# Patient Record
Sex: MALE
[De-identification: names, ages, dates, MRNs, and addresses within clinical notes are randomized per-mention and may not be internally consistent; named-entity substitution may affect disease eponyms.]

---

## 2018-12-05 ENCOUNTER — HOSPITAL ENCOUNTER (EMERGENCY)
Dept: HOSPITAL 14 - H.ER | Age: 28
Discharge: HOME | End: 2018-12-05
Payer: COMMERCIAL

## 2018-12-05 VITALS
HEART RATE: 64 BPM | RESPIRATION RATE: 16 BRPM | SYSTOLIC BLOOD PRESSURE: 136 MMHG | TEMPERATURE: 98.2 F | OXYGEN SATURATION: 100 % | DIASTOLIC BLOOD PRESSURE: 62 MMHG
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
## 2018-12-05 DIAGNOSIS — M54.5: Primary | ICD-10-CM

## 2018-12-05 PROCEDURE — 99283 EMERGENCY DEPT VISIT LOW MDM: CPT

## 2018-12-05 PROCEDURE — 96372 THER/PROPH/DIAG INJ SC/IM: CPT

## 2018-12-05 NOTE — ED PDOC
HPI: Back


Time Seen by Provider: 12/05/18 18:54


Chief Complaint (Nursing): Back Pain


Chief Complaint (Provider): Back Pain


History Per: Patient


History/Exam Limitations: no limitations


Onset/Duration Of Symptoms: Persistent (x2 months), Worse Since (yesterday)


Current Symptoms Are (Timing): Constant


Additional Complaint(s): 


28 year old male presents to the ED for evaluation of chronic lower back pain 

for two months s/p an injury at work that radiates down his bilateral lower 

extremities, with worsening constant pain since yesterday. Patient notes getting

epidural injections in his lower back along with going to PT, but after lying 

supine on his couch yesterday for 50 minutes, it has been worsening. Reports it 

further worsened while driving to the ED today. Of note, patient states he 

prefers no oral medications so has not taken any at home. Otherwise, denies 

trauma, dysuria, incontinence, hematuria, nausea, vomiting, fever, chills, 

abdominal pain, and saddle anesthesia. 





PMD: none provided





Past Medical History


Reviewed: Historical Data, Nursing Documentation, Vital Signs


Vital Signs: 


                                Last Vital Signs











Temp  98.2 F   12/05/18 18:46


 


Pulse  64   12/05/18 18:46


 


Resp  16   12/05/18 18:46


 


BP  136/62   12/05/18 18:46


 


Pulse Ox  100   12/05/18 18:46














- Medical History


PMH: No Chronic Diseases





- Surgical History


Surgical History: No Surg Hx





- Family History


Family History: States: Unknown Family Hx





- Social History


Current smoker - smoking cessation education provided: No


Alcohol: Social


Drugs: Denies





- Home Medications


Home Medications: 


                                Ambulatory Orders











 Medication  Instructions  Recorded


 


Cyclobenzaprine [Cyclobenzaprine 10 mg PO Q8 PRN #10 tab 12/05/18





HCl]  


 


Lidocaine 5% [Lidoderm] 1 ea TD DAILY PRN #10 patch 12/05/18


 


RX: Naproxen [Naprosyn] 500 mg PO BID PRN #10 tab 12/05/18














- Allergies


Allergies/Adverse Reactions: 


                                    Allergies











Allergy/AdvReac Type Severity Reaction Status Date / Time


 


morphine Allergy  SHORTNESS Verified 12/05/18 18:46





   OF BREATH  














Review of Systems


ROS Statement: Except As Marked, All Systems Reviewed And Found Negative


Constitutional: Negative for: Fever, Chills


Gastrointestinal: Negative for: Nausea, Vomiting, Abdominal Pain


Genitourinary Male: Negative for: Dysuria, Incontinence, Hematuria


Musculoskeletal: Positive for: Back Pain (lower, radiating down bilateral LE)


Neurological: Negative for: Other (saddle anesthesias)





Physical Exam





- Reviewed


Nursing Documentation Reviewed: Yes


Vital Signs Reviewed: Yes





- Physical Exam


Appears: Positive for: In Acute Distress (mild painful)


Cardiovascular/Chest: Positive for: Regular Rate, Rhythm


Respiratory: Positive for: Normal Breath Sounds.  Negative for: Respiratory 

Distress


Gastrointestinal/Abdominal: Positive for: Normal Exam, Soft.  Negative for: 

Tenderness


Back: Positive for: Normal Inspection, Other (bilateral paralumbar tenderness). 

 Negative for: L CVA Tenderness, R CVA Tenderness, Vertebral Tenderness





- ECG


O2 Sat by Pulse Oximetry: 100 (RA)


Pulse Ox Interpretation: Normal





Medical Decision Making


Medical Decision Making: 


Time: 1917


Initial Impression: back pain


Initial Plan:


--Lidoderm 5%


--Toradol 30mg IM





On re-evaluation, pt. reports pain is still present but has improved. Advised to

 f/u with his back specialist for further evaluation but is to return to ED 

immediately if symptoms worsen. 





 

--------------------------------------------------------------------------------


-----------------   


Scribe Attestation:


Documented by Juanita Ivy, acting as a scribe for Jakob Chase PA-C


Provider Scribe Attestation:


All medical record entries made by the Scribe were at my direction and 

personally dictated by me. I have reviewed the chart and agree that the record 

accurately reflects my personal performance of the history, physical exam, 

medical decision making, and the department course for this patient. I have also

 personally directed, reviewed, and agree with the discharge instructions and 

disposition.





Disposition





- Clinical Impression


Clinical Impression: 


 Low back pain








- Patient ED Disposition


Is Patient to be Admitted: No





- Disposition


Referrals: 


[facility] [Outside]


[facility] [Outside]


Disposition: Routine/Home


Disposition Time: 20:00


Condition: IMPROVED


Additional Instructions: 


RETURN TO ED IMMEDIATELY IF SYMPTOMS WORSEN


FOLLOW UP WITH YOUR DOCTOR FOR FURTHER EVALUATION





LELE SOLIS, thank you for letting us take care of you today. Your provider was

 Kenyon Valentine III, DO and you were treated for WC: LEG NUMBNESS, BACK PAIN. The 

emergency medical care you received today was directed at your acute symptoms. 

If you were prescribed any medication, please fill it and take as directed. It 

may take several days for your symptoms to resolve. Return to the Emergency 

Department if your symptoms worsen, do not improve, or if you have any other 

problems.





Please contact your doctor or call one of the physicians/clinics you have been 

referred to that are listed on the Patient Visit Information form that is 

included in your discharge packet. Bring any paperwork you were given at 

discharge with you along with any medications you are taking to your follow up 

visit. Our treatment cannot replace ongoing medical care by a primary care 

provider outside of the emergency department.





Thank you for allowing the [facility] team to be part of your care today.








If you had an X-Ray or CT scan: A Radiologist will review the ED reading if any 

change in treatment is needed we will contact you.***





If you had a blood, urine, or wound culture: It will take several days for the 

results, if any change in treatment is needed we will contact you.***





If you had an STI test: It will take 48 hours for the results. Please call after

 1 week if you have not heard back.***


Prescriptions: 


Cyclobenzaprine [Cyclobenzaprine HCl] 10 mg PO Q8 PRN #10 tab


 PRN Reason: Muscle Spasm


Lidocaine 5% [Lidoderm] 1 ea TD DAILY PRN #10 patch


 PRN Reason: Pain


RX: Naproxen [Naprosyn] 500 mg PO BID PRN #10 tab


 PRN Reason: Pain


Instructions:  Low Back Pain  (DC)


Forms:  [facility] (English)

## 2022-03-10 ENCOUNTER — APPOINTMENT (EMERGENCY)
Dept: RADIOLOGY | Facility: HOSPITAL | Age: 32
End: 2022-03-10
Payer: OTHER MISCELLANEOUS

## 2022-03-10 ENCOUNTER — HOSPITAL ENCOUNTER (EMERGENCY)
Facility: HOSPITAL | Age: 32
Discharge: HOME | End: 2022-03-10
Attending: EMERGENCY MEDICINE | Admitting: EMERGENCY MEDICINE
Payer: OTHER MISCELLANEOUS

## 2022-03-10 ENCOUNTER — APPOINTMENT (EMERGENCY)
Dept: CT IMAGING | Facility: HOSPITAL | Age: 32
End: 2022-03-10
Payer: OTHER MISCELLANEOUS

## 2022-03-10 VITALS
HEART RATE: 72 BPM | OXYGEN SATURATION: 100 % | TEMPERATURE: 97.6 F | RESPIRATION RATE: 18 BRPM | DIASTOLIC BLOOD PRESSURE: 77 MMHG | SYSTOLIC BLOOD PRESSURE: 126 MMHG

## 2022-03-10 DIAGNOSIS — S06.0X9A CONCUSSION: ICD-10-CM

## 2022-03-10 DIAGNOSIS — M54.6 ACUTE THORACIC BACK PAIN: ICD-10-CM

## 2022-03-10 DIAGNOSIS — R20.2 INTERMITTENT PARESTHESIA OF LEFT HAND AND FOOT: ICD-10-CM

## 2022-03-10 DIAGNOSIS — S01.81XA FACIAL LACERATION: Primary | ICD-10-CM

## 2022-03-10 PROCEDURE — 72125 CT NECK SPINE W/O DYE: CPT

## 2022-03-10 PROCEDURE — 12011 RPR F/E/E/N/L/M 2.5 CM/<: CPT | Performed by: EMERGENCY MEDICINE

## 2022-03-10 PROCEDURE — G1004 CDSM NDSC: HCPCS

## 2022-03-10 PROCEDURE — 70450 CT HEAD/BRAIN W/O DYE: CPT

## 2022-03-10 PROCEDURE — 72131 CT LUMBAR SPINE W/O DYE: CPT

## 2022-03-10 PROCEDURE — 73110 X-RAY EXAM OF WRIST: CPT

## 2022-03-10 PROCEDURE — 73030 X-RAY EXAM OF SHOULDER: CPT

## 2022-03-10 PROCEDURE — 99284 EMERGENCY DEPT VISIT MOD MDM: CPT

## 2022-03-10 PROCEDURE — 73000 X-RAY EXAM OF COLLAR BONE: CPT

## 2022-03-10 PROCEDURE — 99284 EMERGENCY DEPT VISIT MOD MDM: CPT | Performed by: EMERGENCY MEDICINE

## 2022-03-10 PROCEDURE — 72128 CT CHEST SPINE W/O DYE: CPT

## 2022-03-10 RX ORDER — ACETAMINOPHEN 325 MG/1
650 TABLET ORAL ONCE
Status: DISCONTINUED | OUTPATIENT
Start: 2022-03-10 | End: 2022-03-10 | Stop reason: HOSPADM

## 2022-03-10 RX ORDER — LIDOCAINE 50 MG/G
1 PATCH TOPICAL ONCE
Status: DISCONTINUED | OUTPATIENT
Start: 2022-03-10 | End: 2022-03-10 | Stop reason: HOSPADM

## 2022-03-10 RX ORDER — ONDANSETRON 4 MG/1
4 TABLET, ORALLY DISINTEGRATING ORAL ONCE
Status: DISCONTINUED | OUTPATIENT
Start: 2022-03-10 | End: 2022-03-10 | Stop reason: HOSPADM

## 2022-03-10 RX ORDER — LIDOCAINE HYDROCHLORIDE 10 MG/ML
5 INJECTION, SOLUTION EPIDURAL; INFILTRATION; INTRACAUDAL; PERINEURAL ONCE
Status: DISCONTINUED | OUTPATIENT
Start: 2022-03-10 | End: 2022-03-10 | Stop reason: HOSPADM

## 2022-03-10 RX ADMIN — LIDOCAINE 5% 1 PATCH: 700 PATCH TOPICAL at 15:21

## 2022-03-10 NOTE — ED PROVIDER NOTES
History  Chief Complaint   Patient presents with    Facial Injury     Got knocked into a wooden pallet at work; hit R lateral eyebrow against it  Denies LOC  C/o L lateral neck and shoulder pain and thoracic mid spine "burning"  Small laceration to R eyebrow  JOSE     Patient is a 57-year-old male with no significant past medical history, presents to the emergency department after sustaining an injury at work  Patient reports that he got knocked into a wooden Pallet at work and multiple things on the Pallet fell on top of him which caused him to fall over  He states they fell on his head, neck and back  Patient also reports pain to the left shoulder and left arm with left hand and digit tingling sensation  Patient reports that he does not think he fully lost consciousness but he saw white and stars when he sustained a head injury  He has had a headache, nausea and has been very dizzy  He reports associated photophobia as well  Patient does state that he recently had rotator cuff surgery on the right shoulder 1-2 weeks ago but reports it is feeling okay and denies any new injury to that region  Denies any new paresthesia or weakness in the right arm  Patient denies any chest pain, palpitations, dyspnea, pain with breathing, abdominal pain, vomiting, change in bowel habits, urinary symptoms or difficulty urinating, lower extremity pain/weakness/paresthesia  Patient did sustain a small laceration to the right lateral eyebrow  He denies significant bleeding  Denies being on any blood thinners  Patient up-to-date with tetanus (last shot 2019)  History provided by:  Patient   used: No    Facial Injury  Associated symptoms: headaches and nausea    Associated symptoms: no congestion, no ear pain, no neck pain, no rhinorrhea, no vomiting and no wheezing        None       History reviewed  No pertinent past medical history  History reviewed  No pertinent surgical history      History reviewed  No pertinent family history  I have reviewed and agree with the history as documented  E-Cigarette/Vaping     E-Cigarette/Vaping Substances     Social History     Tobacco Use    Smoking status: Not on file    Smokeless tobacco: Not on file   Substance Use Topics    Alcohol use: Not on file    Drug use: Not on file       Review of Systems   Constitutional: Negative for chills and fever  HENT: Negative for congestion, ear pain, hearing loss, rhinorrhea, sore throat and tinnitus  Eyes: Positive for photophobia  Negative for pain and visual disturbance  Respiratory: Negative for cough, chest tightness, shortness of breath and wheezing  Cardiovascular: Negative for chest pain and palpitations  Gastrointestinal: Positive for nausea  Negative for abdominal pain, constipation, diarrhea and vomiting  Genitourinary: Negative for dysuria, flank pain, frequency and hematuria  Musculoskeletal: Negative for back pain, neck pain and neck stiffness  Skin: Negative for color change, pallor, rash and wound  Allergic/Immunologic: Negative for immunocompromised state  Neurological: Positive for dizziness, light-headedness, numbness and headaches  Negative for seizures, syncope, facial asymmetry, speech difficulty and weakness  Hematological: Negative for adenopathy  Psychiatric/Behavioral: Negative for confusion and decreased concentration  All other systems reviewed and are negative  Physical Exam  Physical Exam  Vitals and nursing note reviewed  Constitutional:       General: He is not in acute distress  Appearance: Normal appearance  He is well-developed  He is not ill-appearing, toxic-appearing or diaphoretic  HENT:      Head: Normocephalic  Comments: Small 0 5 cm mildly gaping laceration to the lateral aspect of the right eyebrow  No active bleeding    Wound superficial      Right Ear: External ear normal       Left Ear: External ear normal       Nose: Nose normal  Mouth/Throat:      Comments: Orpharyngeal exam deferred at this time due to risk of exposure to COVID-19 during current pandemic  Patient has no oropharyngeal complaints  Eyes:      Extraocular Movements: Extraocular movements intact  Conjunctiva/sclera: Conjunctivae normal       Pupils: Pupils are equal, round, and reactive to light  Comments: +Photophobia  Neck:      Vascular: No JVD  Cardiovascular:      Rate and Rhythm: Normal rate and regular rhythm  Pulses: Normal pulses  Heart sounds: Normal heart sounds  No murmur heard  No friction rub  No gallop  Pulmonary:      Effort: Pulmonary effort is normal  No respiratory distress  Breath sounds: Normal breath sounds  No wheezing, rhonchi or rales  Chest:      Chest wall: No tenderness  Abdominal:      General: There is no distension  Palpations: Abdomen is soft  There is no mass  Tenderness: There is no abdominal tenderness  There is no guarding or rebound  Musculoskeletal:         General: Tenderness present  No deformity  Cervical back: Normal range of motion and neck supple  Tenderness present  No rigidity  Comments: +Midline lower C-spine and diffuse midline T/L spine tenderness  No step-offs  No visible abrasions or lacerations to the back  +Left trapezius and cervical paravertebral muscle tenderness  +Left clavicle and shoulder tenderness  Decreased ROM left shoulder  FROM left elbow  Left wrist extension reproduces pain in left wrist but no wrist tenderness  No tenderness in left hand, forearm, elbow or upper arm  5/5 strength proximal and distal muscle groups LUE  Normal hand  strength  Subjective decreased sensation of all digits compared to the right hand  Skin:     General: Skin is warm and dry  Coloration: Skin is not pale  Findings: No erythema or rash  Neurological:      General: No focal deficit present        Mental Status: He is alert and oriented to person, place, and time       Cranial Nerves: No cranial nerve deficit  Sensory: Sensory deficit present  Motor: No weakness  Psychiatric:         Mood and Affect: Mood normal          Behavior: Behavior normal          Vital Signs  ED Triage Vitals [03/10/22 1409]   Temperature Pulse Respirations Blood Pressure SpO2   97 6 °F (36 4 °C) 72 18 126/77 100 %      Temp src Heart Rate Source Patient Position - Orthostatic VS BP Location FiO2 (%)   -- Monitor Sitting Right arm --      Pain Score       --         Vitals:    03/10/22 1409   BP: 126/77   BP Location: Right arm   Pulse: 72   Resp: 18   Temp: 97 6 °F (36 4 °C)   SpO2: 100%       Visual Acuity  Visual Acuity      Most Recent Value   L Pupil Size (mm) 3   R Pupil Size (mm) 3          ED Medications  Medications   acetaminophen (TYLENOL) tablet 650 mg (650 mg Oral Not Given 3/10/22 1520)   ondansetron (ZOFRAN-ODT) dispersible tablet 4 mg (4 mg Oral Not Given 3/10/22 1521)   lidocaine (PF) (XYLOCAINE-MPF) 1 % injection 5 mL (has no administration in time range)   lidocaine (LIDODERM) 5 % patch 1 patch (1 patch Topical Medication Applied 3/10/22 1521)       Diagnostic Studies  Results Reviewed     None                 XR shoulder 2+ views LEFT   ED Interpretation by Myriam Amaya DO (03/10 1516)   No acute osseous abnormality  XR wrist 3+ views LEFT   ED Interpretation by Myriam Amaya DO (03/10 1516)   No acute osseous abnormality  XR clavicle LEFT   ED Interpretation by Myriam Amaya DO (03/10 1515)   No acute osseous abnormality  CT head without contrast   Final Result by Rolo Ny DO (03/10 1530)      No acute intracranial abnormality  Workstation performed: [id number]         CT spine cervical without contrast   Final Result by Rolo Ny DO (03/10 1539)      No cervical spine fracture or traumatic malalignment                     Workstation performed: [id number]         CT spine thoracic & lumbar wo contrast   Final Result by Monica Mccall MD (03/10 1533)      No acute osseous abnormality of thoracic or lumbar spine  Workstation performed: [id number]                    Procedures  Laceration repair    Date/Time: 3/10/2022 3:39 PM  Performed by: Martinez Glover DO  Authorized by: Martinez Glover DO   Consent: Verbal consent obtained  Risks and benefits: risks, benefits and alternatives were discussed  Consent given by: patient  Patient understanding: patient states understanding of the procedure being performed  Patient identity confirmed: verbally with patient  Body area: head/neck  Location details: right eyebrow  Laceration length: 0 5 cm  Foreign bodies: no foreign bodies  Tendon involvement: none  Nerve involvement: none  Vascular damage: no  Anesthesia: local infiltration    Anesthesia:  Local Anesthetic: lidocaine 1% without epinephrine  Anesthetic total: 1 mL    Sedation:  Patient sedated: no        Procedure Details:  Preparation: Patient was prepped and draped in the usual sterile fashion  Irrigation solution: saline  Irrigation method: jet lavage  Amount of cleaning: standard  Debridement: none  Degree of undermining: none  Wound skin closure material used: 5-0 Vicryl  Number of sutures: 2  Technique: simple  Approximation: close  Approximation difficulty: simple  Patient tolerance: patient tolerated the procedure well with no immediate complications               ED Course  ED Course as of 03/10/22 1557   Thu Mar 10, 2022   1540 At time of laceration repair, patient was starting to complain of tingling in both of his feet worse on the left side  On repeat examination, patient has 5/5 strength in bilateral lower extremities both proximal and distal muscle groups including normal dorsiflexion and plantar flexion  2+ DP and PT pulses bilaterally  Gross sensation intact but subjectively decreased over the left toes    Patient refused medication and states that he does not like to take any medications  Explained to him that it would likely help his symptoms but patient still refusing other than the lidocaine patch  CT scans read and are unremarkable  Will give referral to ambulatory spine program and concussion clinic     1553 Updated patient about normal findings  Discussed symptom management at home, when to return to the ER and advised close follow-up with comprehensive spine program as well as concussion clinic  SBIRT 20yo+      Most Recent Value   SBIRT (22 yo +)    In order to provide better care to our patients, we are screening all of our patients for alcohol and drug use  Would it be okay to ask you these screening questions? Yes Filed at: 03/10/2022 1412   Initial Alcohol Screen: US AUDIT-C     1  How often do you have a drink containing alcohol? 0 Filed at: 03/10/2022 1412   2  How many drinks containing alcohol do you have on a typical day you are drinking? 0 Filed at: 03/10/2022 1412   3a  Male UNDER 65: How often do you have five or more drinks on one occasion? 0 Filed at: 03/10/2022 1412   3b  FEMALE Any Age, or MALE 65+: How often do you have 4 or more drinks on one occassion? 0 Filed at: 03/10/2022 1412   Audit-C Score 0 Filed at: 03/10/2022 1412   AARON: How many times in the past year have you    Used an illegal drug or used a prescription medication for non-medical reasons? Never Filed at: 03/10/2022 1412                    MDM  Number of Diagnoses or Management Options  Diagnosis management comments: 79-year-old male without significant medical history presents for injury that he sustained at work when a Pallet fell on top of him causing a head, neck and back injury as well as injury to the left arm  Patient is having left arm paresthesia  No motor deficits  Will obtain CT imaging of the head, cervical, thoracic and lumbar spine    Will obtain x-rays of the left clavicle, left shoulder and left wrist   Will provide Tylenol, Zofran and Lidoderm patch for symptom relief  Discussed options for wound management in regards to the small right eyebrow laceration and will anesthetize wound with lidocaine and repair with 1-2 absorbable sutures  Amount and/or Complexity of Data Reviewed  Tests in the radiology section of CPT®: ordered and reviewed  Independent visualization of images, tracings, or specimens: yes        Disposition  Final diagnoses:   Facial laceration   Concussion   Acute thoracic back pain   Intermittent paresthesia of left hand and foot     Time reflects when diagnosis was documented in both MDM as applicable and the Disposition within this note     Time User Action Codes Description Comment    3/10/2022  3:54 PM Wei Monsalve1 [address] Facial laceration     3/10/2022  3:54 PM Cosimo Climes E Add [S06 0X9A] Concussion     3/10/2022  3:54 PM Cosimo Climes E Add [M54 6] Acute thoracic back pain     3/10/2022  3:54 PM Cosimo Climes E Add [R20 2] Intermittent paresthesia of left hand and foot       ED Disposition     ED Disposition Condition Date/Time Comment    Discharge Stable Thu Mar 10, 2022  3:54 PM Tacho Race discharge to home/self care              Follow-up Information     Follow up With Specialties Details Why Contact Info Additional Information    Infolink  Call  To establish care with a primary care doctor [address] Orthopedic Surgery Schedule an appointment as soon as possible for a visit  for evaluation of left shoulder/arm pain [address] [facility] [address], [place], [place], [address] [facility] Emergency Department Emergency Medicine Go to  If symptoms worsen [address] [phone]   [facility] Emergency Department, [address] [place], [place], [address]          Patient's Medications    No medications on file           PDMP Review     None          ED Provider  Electronically Signed by           Socorro Traore DO  03/10/22 1858

## 2022-03-10 NOTE — Clinical Note
Smooth Garcia was seen and treated in our emergency department on 3/10/2022  No restrictions            Diagnosis:     [place]  may return to work on return date  He may return on this date: 03/14/2022         If you have any questions or concerns, please don't hesitate to call        Sara Triana DO    ______________________________           _______________          _______________  Hospital Representative                              Date                                Time

## 2022-03-11 ENCOUNTER — HOSPITAL ENCOUNTER (EMERGENCY)
Facility: HOSPITAL | Age: 32
Discharge: HOME | End: 2022-03-11
Attending: EMERGENCY MEDICINE
Payer: OTHER MISCELLANEOUS

## 2022-03-11 ENCOUNTER — TELEPHONE (OUTPATIENT)
Dept: PHYSICAL THERAPY | Facility: OTHER | Age: 32
End: 2022-03-11

## 2022-03-11 ENCOUNTER — APPOINTMENT (EMERGENCY)
Dept: RADIOLOGY | Facility: HOSPITAL | Age: 32
End: 2022-03-11
Payer: OTHER MISCELLANEOUS

## 2022-03-11 VITALS
HEART RATE: 63 BPM | SYSTOLIC BLOOD PRESSURE: 140 MMHG | OXYGEN SATURATION: 99 % | DIASTOLIC BLOOD PRESSURE: 92 MMHG | TEMPERATURE: 96.4 F | RESPIRATION RATE: 18 BRPM

## 2022-03-11 DIAGNOSIS — M54.9 ACUTE BACK PAIN, UNSPECIFIED BACK LOCATION, UNSPECIFIED BACK PAIN LATERALITY: Primary | ICD-10-CM

## 2022-03-11 DIAGNOSIS — S46.001A ROTATOR CUFF INJURY, RIGHT, INITIAL ENCOUNTER: Primary | ICD-10-CM

## 2022-03-11 PROCEDURE — 99284 EMERGENCY DEPT VISIT MOD MDM: CPT | Performed by: EMERGENCY MEDICINE

## 2022-03-11 PROCEDURE — 99283 EMERGENCY DEPT VISIT LOW MDM: CPT

## 2022-03-11 PROCEDURE — 73030 X-RAY EXAM OF SHOULDER: CPT

## 2022-03-11 RX ORDER — NAPROXEN 375 MG/1
375 TABLET ORAL 2 TIMES DAILY WITH MEALS
Qty: 20 TABLET | Refills: 0 | Status: SHIPPED | OUTPATIENT
Start: 2022-03-11 | End: 2022-04-14

## 2022-03-11 RX ORDER — NAPROXEN 250 MG/1
500 TABLET ORAL ONCE
Status: COMPLETED | OUTPATIENT
Start: 2022-03-11 | End: 2022-03-11

## 2022-03-11 RX ADMIN — NAPROXEN 500 MG: 250 TABLET ORAL at 11:27

## 2022-03-11 NOTE — DISCHARGE INSTRUCTIONS
Rest  Use the sling as needed  Naprosyn twice daily reduce inflammation  Ice to their soreness  Follow-up with Dr Jose Villalta

## 2022-03-11 NOTE — ED NOTES
Discharged reviewed with pt  Pt verbalized understanding and has no further questions at this time  Pt ambulatory off unit with steady gait       Debby Rocha RN  03/11/22 [id number]

## 2022-03-11 NOTE — TELEPHONE ENCOUNTER
Called patient per VM he left @ 9 am today  Patient confirmed that his his injuries are work related and claim was filed  RN explained to the patient that worker's comp claims are not direct access eligible for Physical Therapy and would need to handled through our Occupational Medicine Dept  RN informed patient that their information will be sent to Occupational Medicine and that they will be receiving a call from that office  Patient voiced understanding    Referral Closed

## 2022-03-11 NOTE — ED PROVIDER NOTES
History  Chief Complaint   Patient presents with    Shoulder Pain     Seen here yesterday for same  states pain worse     HPI patient is a 39-year-old male, seen here yesterday after an accident at work  Patient was apparently knocked into wooden [place] at work  He complained of some injury to his left eyebrow, had some neck pain had some shoulder pain  Patient had CT scans at the time of the brain cervical spine thoracic lumbar spine without fracture, he had a left clavicle left shoulder x-ray left wrist x-ray without fracture  Patient laceration repair  Patient reports he had no right shoulder pain initially but overnight developed increasing pain in his right anterior shoulder  He reports today tried to lift the arm to get a bottle for his son and developed increasing pain  He complains of pain when he tries to bring the arm away from the body  He reports any abduction causes pain  Denies any numbness or weakness  Denies any difficulty using his hand or his elbow  He points primarily to the anterior portion of his right shoulder where he is tender  Past medical history previous right shoulder rotator cuff tear  Family history-noncontributory  Social history employed, reports history of drug abuse does not want to use any medications for pain    None       No past medical history on file  No past surgical history on file  No family history on file  I have reviewed and agree with the history as documented  E-Cigarette/Vaping     E-Cigarette/Vaping Substances     Social History     Tobacco Use    Smoking status: Not on file    Smokeless tobacco: Not on file   Substance Use Topics    Alcohol use: Not on file    Drug use: Not on file       Review of Systems   Constitutional: Negative for fever  HENT: Negative for congestion  Eyes: Negative for pain and redness  Respiratory: Negative for cough and shortness of breath  Cardiovascular: Negative for chest pain     Gastrointestinal: Negative for abdominal pain and vomiting  Reports right shoulder pain    Physical Exam  Physical Exam  Vitals and nursing note reviewed  Constitutional:       Appearance: He is well-developed  HENT:      Head: Normocephalic  Right Ear: External ear normal       Left Ear: External ear normal       Nose: Nose normal    Eyes:      General: Lids are normal       Pupils: Pupils are equal, round, and reactive to light  Pulmonary:      Effort: Pulmonary effort is normal  No respiratory distress  Musculoskeletal:         General: No deformity  Cervical back: Normal range of motion and neck supple  Comments: There is tenderness over the right anterior shoulder there is pain with any abduction of the arm, there is pain with resisted abduction  Good distal pulses and sensation neurovascular tendon intact  Normal strength at the elbow and the wrist   Good distal pulses and sensation normal radial and ulnar pulse  Good capillary refill   Skin:     General: Skin is warm and dry  Neurological:      Mental Status: He is alert and oriented to person, place, and time  Vital Signs  ED Triage Vitals [03/11/22 1100]   Temperature Pulse Respirations Blood Pressure SpO2   (!) 96 4 °F (35 8 °C) 63 18 140/92 99 %      Temp Source Heart Rate Source Patient Position - Orthostatic VS BP Location FiO2 (%)   Tympanic Monitor Sitting Left arm --      Pain Score       7           Vitals:    03/11/22 1100   BP: 140/92   Pulse: 63   Patient Position - Orthostatic VS: Sitting         Visual Acuity      ED Medications  Medications   naproxen (NAPROSYN) tablet 500 mg (500 mg Oral Given 3/11/22 1127)       Diagnostic Studies  Results Reviewed     None                 XR shoulder 2+ views RIGHT   Final Result by Cee Gr MD (03/11 1252)      No acute osseous abnormality              Workstation performed: [id number]                    Procedures  Procedures         ED Course      x-ray the right shoulder showed no fracture no dislocation no bony lesions interpreted by me I was initial   SBIRT 22yo+      Most Recent Value   SBIRT (22 yo +)    In order to provide better care to our patients, we are screening all of our patients for alcohol and drug use  Would it be okay to ask you these screening questions? Yes Filed at: 03/11/2022 1139   Initial Alcohol Screen: US AUDIT-C     1  How often do you have a drink containing alcohol? 0 Filed at: 03/11/2022 1139   2  How many drinks containing alcohol do you have on a typical day you are drinking? 0 Filed at: 03/11/2022 1139   3a  Male UNDER 65: How often do you have five or more drinks on one occasion? 0 Filed at: 03/11/2022 1139   3b  FEMALE Any Age, or MALE 65+: How often do you have 4 or more drinks on one occassion? 0 Filed at: 03/11/2022 1139   Audit-C Score 0 Filed at: 03/11/2022 1139   AARON: How many times in the past year have you    Used an illegal drug or used a prescription medication for non-medical reasons? Never Filed at: 03/11/2022 1139                    [facility] medical decision making 34-year-old male presents emergency department with right shoulder pain after an injury yesterday, x-ray today showed no fracture, discussed outpatient follow-up, discussed analgesics  Patient initially resistant to taking any analgesics but will take nonnarcotic medications  Prescribed Naprosyn  We discussed outpatient treatment follow-up we discussed indications to return      Disposition  Final diagnoses:   Rotator cuff injury, right, initial encounter     Time reflects when diagnosis was documented in both MDM as applicable and the Disposition within this note     Time User Action Codes Description Comment    3/11/2022 11:24 AM Ariadna Gonzalez Add [[id number] 001A] Rotator cuff injury, right, initial encounter       ED Disposition     ED Disposition Condition Date/Time Comment    Discharge Stable Fri Mar 11, 2022 11:24 AM Veronika Kay discharge to home/self care  Follow-up Information     Follow up With Specialties Details Why Contact Info    [facility], DO Sports Medicine Call   [address] [address] [facility] [address] [facility]  [address]  [place]  [phone]            Discharge Medication List as of 3/11/2022 11:51 AM      START taking these medications    Details   naproxen (NAPROSYN) 375 mg tablet Take 1 tablet (375 mg total) by mouth 2 (two) times a day with meals for 20 doses, Starting Fri 3/11/2022, Until Mon 3/21/2022, Normal             No discharge procedures on file      PDMP Review     None          ED Provider  Electronically Signed by           Katelynn Conley MD  03/11/22 8411

## 2022-03-11 NOTE — Clinical Note
Keli Aguilar was seen and treated in our emergency department on 3/11/2022  Diagnosis:     [place]  may return to work on return date  He may return on this date: 03/21/2022         If you have any questions or concerns, please don't hesitate to call        Ros Mendoza MD    ______________________________           _______________          _______________  Hospital Representative                              Date                                Time

## 2022-03-12 NOTE — DISCHARGE INSTRUCTIONS
Care For Your Absorbable Stitches   WHAT YOU NEED TO KNOW:   Absorbable stitches, or sutures, are used to close cuts or wounds  These stitches are absorbed by your body, or fall off on their own within days or weeks  They do not need to be removed  DISCHARGE INSTRUCTIONS:   Seek care immediately if:   · Your wound comes apart  · You have red streaks around your wound  · You have swollen or painful lymph nodes  · Blood soaks through your bandage  Contact your healthcare provider if:   · You have signs of an infection, such as increased redness, pain, swelling, or pus  · You have a fever  · Your stitches do not absorb when your healthcare provider says they should  · You have questions or concerns about your condition or care  Care for your wound and absorbable stitches as directed:   · Protect the stitches  Wear protective clothing over the stitches, and protect the area from sunlight  Do not place pressure on your wound  This could open your wound and increase your risk for an infection  · Clean your wound as directed  Carefully wash the wound with soap and water  Gently dry the area and put on new, clean bandages as directed  Change your bandages when they get wet or dirty  Check your wound for signs of infection when you clean it  Signs include redness, swelling, and pus  · Keep the area dry as directed  Wait 12 to 24 hours after you receive your stitches before you take a shower  Take showers instead of baths  Do not take a bath or swim  Your healthcare provider will give you instructions for bathing with your stitches  Help your wound heal:   · Elevate your wound  Keep your wound above the level of your heart as often as you can  This will help decrease swelling and pain  Prop the area on pillows or blankets, if possible, to keep it elevated comfortably  · Limit activity  Do not stretch the skin around your wound   This will help prevent bleeding and Kimberli Springer RN     swelling  Follow up with your doctor as directed:  Write down your questions so you remember to ask them during your visits  © Copyright [employer] 2022 Information is for End User's use only and may not be sold, redistributed or otherwise used for commercial purposes  All illustrations and images included in CareNotes® are the copyrighted property of A D A M , Inc  or [employer]  The above information is an  only  It is not intended as medical advice for individual conditions or treatments  Talk to your doctor, nurse or pharmacist before following any medical regimen to see if it is safe and effective for you  Concussion   WHAT YOU NEED TO KNOW:   A concussion is a mild brain injury  It is usually caused by a bump or blow to the head from a fall, a motor vehicle crash, or a sports injury  Sometimes being shaken forcefully may cause a concussion  DISCHARGE INSTRUCTIONS:   Have someone call 911 for any of the following:   · You cannot be woken  · You have a seizure, increasing confusion, or a change in personality  · Your speech becomes slurred, or you have new vision problems  Seek care immediately if:   · You have sudden and new vision problems  · You have a severe headache that does not go away  · You have arm or leg weakness, numbness, or new problems with coordination  · You have blood or clear fluid coming out of the ears or nose  Contact your healthcare provider if:   · You have nausea or are vomiting  · You feel more sleepy than usual     · Your symptoms get worse  · Your symptoms last longer than 6 weeks after the injury  · You have questions or concerns about your condition or care  Medicines: You may need any of the following:  · Acetaminophen  decreases pain and fever  It is available without a doctor's order  Ask how much to take and how often to take it  Follow directions   Read the labels of all other medicines you are using to see if they also contain acetaminophen, or ask your doctor or pharmacist  Acetaminophen can cause liver damage if not taken correctly  Do not use more than 4 grams (4,000 milligrams) total of acetaminophen in one day  · NSAIDs  help decrease swelling and pain or fever  This medicine is available with or without a doctor's order  NSAIDs can cause stomach bleeding or kidney problems in certain people  If you take blood thinner medicine, always ask your healthcare provider if NSAIDs are safe for you  Always read the medicine label and follow directions  · Take your medicine as directed  Contact your healthcare provider if you think your medicine is not helping or if you have side effects  Tell him or her if you are allergic to any medicine  Keep a list of the medicines, vitamins, and herbs you take  Include the amounts, and when and why you take them  Bring the list or the pill bottles to follow-up visits  Carry your medicine list with you in case of an emergency  Self-care:  Concussion symptoms usually go away within about 10 days, but they may last longer  The following may be recommended to manage your symptoms:  · Rest from physical and mental activities as directed  Mental activities are those that require thinking, concentration, and attention  You will need to rest until your symptoms are gone  Rest will allow you to recover from your concussion  Ask your healthcare provider when you can return to work and other daily activities  · Have someone stay with you for the first 24 hours after your injury  Your healthcare provider should be contacted if your symptoms get worse, or you develop new symptoms  · Do not participate in sports and physical activities until your healthcare provider says it is okay  They could make your symptoms worse or lead to another concussion  Your healthcare provider will tell you when it is okay for you to return to sports or physical activities   Ask for more information about sports concussions  Prevent another concussion:   · Wear protective sports equipment that fits properly  Helmets help decrease your risk for a serious brain injury  Talk to your healthcare provider about ways that can decrease your risk for a concussion if you play sports  · Wear your seatbelt every time you travel  This helps to decrease your risk for a head injury if you are in a car accident  Follow up with your doctor as directed:  Write down your questions so you remember to ask them during your visits  © Copyright [employer] 2022 Information is for End User's use only and may not be sold, redistributed or otherwise used for commercial purposes  All illustrations and images included in CareNotes® are the copyrighted property of A D A M , Inc  or [facility] Tammy Gomez   The above information is an  only  It is not intended as medical advice for individual conditions or treatments  Talk to your doctor, nurse or pharmacist before following any medical regimen to see if it is safe and effective for you  Back Pain   WHAT YOU NEED TO KNOW:   Back pain is common  You may have back pain and muscle spasms  You may feel sore or stiff on one or both sides of your back  The pain may spread to your lower body  Conditions that affect the spine, joints, or muscles can cause back pain  These may include arthritis, spinal stenosis (narrowing of the spinal column), muscle tension, or breakdown of the spinal discs  DISCHARGE INSTRUCTIONS:   Call your local emergency number (911 in the [address] [place] [address],[address]) if:   · You have severe back pain with chest pain  · You cannot control your urine or bowel movements  · Your pain becomes so severe that you cannot walk  Return to the emergency department if:   · You have pain, numbness, or weakness in one or both legs  · You have severe back pain, nausea, and vomiting  · You have severe back pain that spreads to your side or genital area      Call your doctor if:   · You have back pain that does not get better with rest and pain medicine  · You have a fever  · You have pain that worsens when you are on your back or when you rest     · You have pain that worsens when you cough or sneeze  · You lose weight without trying  · You have questions or concerns about your condition or care  Medicines: You may need any of the following:  · NSAIDs , such as ibuprofen, help decrease swelling, pain, and fever  This medicine is available with or without a doctor's order  NSAIDs can cause stomach bleeding or kidney problems in certain people  If you take blood thinner medicine, always ask your healthcare provider if NSAIDs are safe for you  Always read the medicine label and follow directions  · Acetaminophen  decreases pain and fever  It is available without a doctor's order  Ask how much to take and how often to take it  Follow directions  Read the labels of all other medicines you are using to see if they also contain acetaminophen, or ask your doctor or pharmacist  Acetaminophen can cause liver damage if not taken correctly  Do not use more than 4 grams (4,000 milligrams) total of acetaminophen in one day  · Muscle relaxers  help decrease muscle spasms and back pain  · Prescription pain medicine  may be given  Ask your healthcare provider how to take this medicine safely  Some prescription pain medicines contain acetaminophen  Do not take other medicines that contain acetaminophen without talking to your healthcare provider  Too much acetaminophen may cause liver damage  Prescription pain medicine may cause constipation  Ask your healthcare provider how to prevent or treat constipation  · Take your medicine as directed  Contact your healthcare provider if you think your medicine is not helping or if you have side effects  Tell him or her if you are allergic to any medicine  Keep a list of the medicines, vitamins, and herbs you take   Include the amounts, and when and why you take them  Bring the list or the pill bottles to follow-up visits  Carry your medicine list with you in case of an emergency  How to manage your back pain:   · Apply ice  on your back for 15 to 20 minutes every hour or as directed  Use an ice pack, or put crushed ice in a plastic bag  Cover it with a towel before you apply it to your skin  Ice helps prevent tissue damage and decreases pain  · Apply heat  on your back for 20 to 30 minutes every 2 hours for as many days as directed  Heat helps decrease pain and muscle spasms  · Stay active  as much as you can without causing more pain  Bed rest could make your back pain worse  Avoid heavy lifting until your pain is gone  · Go to physical therapy as directed  A physical therapist can teach you exercises to help improve movement and strength, and to decrease pain  Follow up with your doctor in 2 weeks, or as directed: You might need to see a specialist  Write down your questions so you remember to ask them during your visits  © [employer] 2022 Information is for End User's use only and may not be sold, redistributed or otherwise used for commercial purposes  All illustrations and images included in CareNotes® are the copyrighted property of A D A M , Inc  or [facility] Tammy Gomez   The above information is an  only  It is not intended as medical advice for individual conditions or treatments  Talk to your doctor, nurse or pharmacist before following any medical regimen to see if it is safe and effective for you  Paresthesia   WHAT YOU NEED TO KNOW:   Paresthesia is numbness, tingling, or burning  It can happen in any part of your body, but usually occurs in your legs, feet, arms, or hands  DISCHARGE INSTRUCTIONS:   Return to the emergency department if:   · You have severe pain along with numbness and tingling  · Your legs suddenly become cold   You have trouble moving your legs, and they ache     · You have increased weakness in a part of your body  · You have uncontrolled movements  Contact your healthcare provider or neurologist if:   · Your symptoms do not improve  · You have symptoms in more than one part of your body  · You have questions or concerns about your condition or care  Manage paresthesia:   · Protect the area from injury  You may injure or burn yourself if you lose feeling in the area  Be careful when you touch anything that could be hot  Wear sturdy shoes to protect your feet  Ask about other ways to protect yourself  · Go to physical or occupational therapy if directed  Your provider may recommend therapy if you have a condition such as carpal tunnel syndrome  A physical therapist can teach you exercises to help strengthen the area or increase your ability to move it  An occupational therapist can help you find new ways to do your daily activities  · Manage health conditions that can cause paresthesia  Work with your diabetes specialist if you have uncontrolled diabetes  A dietitian or your healthcare provider can help you create a meal plan if you have low vitamin B levels  Your provider can help you manage your health if you have multiple sclerosis or you had a stroke  It is important to manage health conditions to stop paresthesia or prevent it from getting worse  Follow up with your healthcare provider or neurologist as directed: Your healthcare provider may refer you to a specialist  Write down your questions so you remember to ask them during your visits  © Copyright [employer] 2022 Information is for End User's use only and may not be sold, redistributed or otherwise used for commercial purposes  All illustrations and images included in CareNotes® are the copyrighted property of [employer] , Inc  or [employer]  The above information is an  only  It is not intended as medical advice for individual conditions or treatments  Talk to your doctor, nurse or pharmacist before following any medical regimen to see if it is safe and effective for you

## 2022-03-14 ENCOUNTER — TELEPHONE (OUTPATIENT)
Dept: URGENT CARE | Facility: CLINIC | Age: 32
End: 2022-03-14

## 2022-03-14 ENCOUNTER — OFFICE VISIT (OUTPATIENT)
Dept: OBGYN CLINIC | Facility: CLINIC | Age: 32
End: 2022-03-14
Payer: OTHER MISCELLANEOUS

## 2022-03-14 VITALS — OXYGEN SATURATION: 100 % | WEIGHT: 159.2 LBS | HEART RATE: 85 BPM | HEIGHT: 72 IN | BODY MASS INDEX: 21.56 KG/M2

## 2022-03-14 DIAGNOSIS — M25.512 ACUTE PAIN OF BOTH SHOULDERS: ICD-10-CM

## 2022-03-14 DIAGNOSIS — M25.511 ACUTE PAIN OF BOTH SHOULDERS: ICD-10-CM

## 2022-03-14 DIAGNOSIS — R29.898 SHOULDER WEAKNESS: Primary | ICD-10-CM

## 2022-03-14 DIAGNOSIS — M25.611 DECREASED ROM OF RIGHT SHOULDER: ICD-10-CM

## 2022-03-14 PROCEDURE — 99203 OFFICE O/P NEW LOW 30 MIN: CPT | Performed by: PHYSICIAN ASSISTANT

## 2022-03-14 NOTE — LETTER
March 14, 2022     Patient: Marta Fletcher   YOB: 1990   Date of Visit: 3/14/2022       To Whom it May Concern:    Marta Fletcher is under my professional care  He was seen in my office on 3/14/2022  He may return to work with limitations : no use of the right arm  No overhead lifting  Max liting 5 lbs  If work is unable to accommodate this, then remain out of work until follow up visit in 2 weeks       If you have any questions or concerns, please don't hesitate to call           Sincerely,          Oumar Woodruff PA-C        CC: Marta Fletcher

## 2022-03-14 NOTE — PATIENT INSTRUCTIONS
Ice Pack Application   WHAT YOU NEED TO KNOW:   Ice can be used to decrease swelling and pain after an injury or surgery  Common injuries that may benefit from ice therapy are sprains, strains, and bruises  The use of ice is most effective in the first 1 to 3 days after an injury  DISCHARGE INSTRUCTIONS:   How to apply ice:   · Fill a bag with crushed ice about half full  Remove the air from the bag before you close it  You can also use a bag of frozen vegetables  · Wrap the ice pack in a cloth to protect your skin from frostbite or other injury  · Put the ice over the injured area for 20 to 30 minutes or as long as directed  · Check your skin after about 30 seconds for color changes or blistering  Remove the ice if you notice skin changes or you feel burning or numbness in the area  · Throw the ice pack away after use  · Apply ice to your injured area 4 times each day or as directed  Ask your healthcare provider how many days you should apply ice  Contact your healthcare provider if:   · You see blisters, whitening of your skin, or a bluish color to your skin after using ice  · You feel burning or numbness when using ice  · You have questions about the use of ice packs  © 2017 [facility] Information is for End User's use only and may not be sold, redistributed or otherwise used for commercial purposes  All illustrations and images included in CareNotes® are the copyrighted property of [employer]  or Paul Miranda  The above information is an  only  It is not intended as medical advice for individual conditions or treatments  Talk to your doctor, nurse or pharmacist before following any medical regimen to see if it is safe and effective for you  Safe Use of NSAIDs   WHAT YOU NEED TO KNOW:   NSAIDs are medicines that are used to decrease pain, swelling, and fever  NSAIDs are available with or without a doctor's order   NSAIDs that you can buy without a doctor's order include aspirin, ibuprofen, and naproxen  DISCHARGE INSTRUCTIONS:   Return to the emergency department if:   · You have swelling around your mouth or trouble breathing  · You are breathing fast or you have a fast heartbeat  · You have nausea, vomiting, or abdominal pain  · You have blood in your vomit or bowel movements  · You have a seizure  Contact your healthcare provider if:   · You have a headache or become confused  · You develop hearing loss or ringing in your ears  · You develop itching, a rash, or hives  · You have swelling around your lower legs, feet, ankles, and hands  · You do not know how much NSAIDs to give to your child  · You have questions or concerns about your condition or care  How to give NSAIDs to your child safely:   · Read the directions on the label  Find out if the medicine is right for your child's age and how much to give to your child  The dose for your child's weight or age should be listed  Do not  give your child more than the recommended amount  · Use the measuring tool that came with the medicine  Do not  use another measuring tool, such as a kitchen spoon  Other measuring tools do not provide the right amount of medicine  How to take NSAIDs safely:   · Read the directions on the label to learn how much medicine you should take and often to take it  Do not take more than the recommended amount  · Talk to your healthcare provider if you need take NSAIDs for more than 30 days  The longer you take NSAIDs, the higher your risk of side effects will be  You may need to take other medicines to decrease your risk of side effects such as stomach bleeding  · Do not take an over-the-counter NSAIDs with prescription NSAIDs  The combined amount of NSAIDs may be too high  · Tell your healthcare provider about other medicines you take  Some medicines can increase the risk of side effects from NSAIDs   Your healthcare provider will tell you if it is okay to take NSAIDs and how to take them  Who should not take NSAIDs:  Certain people should avoid or limit NSAIDs  Do not  give NSAIDs to children under 10months of age without direction from your child's doctor  Do not give aspirin to children under 25years of age  Your child could develop Reye syndrome if he takes aspirin  Reye syndrome can cause life-threatening brain and liver damage  Check your child's medicine labels for aspirin, salicylates, or oil of wintergreen  Talk to your healthcare provider before you take NSAIDs if any of the following apply to you:  · You have reflux disease, a peptic ulcer, H pylori infection, or bleeding in your stomach or intestines  · You have a bleeding disorder, or you take blood-thinning medicine  · You are allergic to aspirin or other NSAIDs  · You have liver or kidney or disease  · You have high blood pressure or heart disease  · You have 3 or more alcoholic drinks each day  · You are pregnant  What you need to know about an NSAID overdose:  Certain health problems can occur if you take too much NSAID medicine at one time or over time  Problems include nausea, vomiting, and abdominal pain  You may develop gastritis, peptic ulcers, and stomach bleeding  You may also develop fluid retention, heart problems, and kidney problems  NSAIDs can worsen high blood pressure  You may become confused, or you may have a headache, hearing loss, or hallucinations  An overdose of aspirin may also cause rapid breathing, a rapid heartbeat, or seizures  What to do if you think you or your child took too much NSAID medicine:  Call the [facility] at [phone] immediately  © 2017 [address] [employer]  Information is for End User's use only and may not be sold, redistributed or otherwise used for commercial purposes   All illustrations and images included in CareNotes® are the copyrighted property of IFRAH [employer]RIGUE[employer] Deena  or Paul Miranda  The above information is an  only  It is not intended as medical advice for individual conditions or treatments  Talk to your doctor, nurse or pharmacist before following any medical regimen to see if it is safe and effective for you

## 2022-03-14 NOTE — PROGRESS NOTES
Assessment/Plan   Diagnoses and all orders for this visit:    Right Shoulder weakness  -     Ambulatory Referral to Physical Therapy; B/L  -     MRI shoulder right wo contrast; Right  -     Ice, Naprosyn as needed  -     Work: no use of right arm  No overhead work  Max 5lbs with left  -     Follow up with Dr Rei Gloria or Dr Ga Shows 2 weeks    Decreased ROM of right shoulder      Contusion of both shoulders            Subjective   Patient ID: Erin Dawson is a 27 y o  male  Vitals:    03/14/22 1859   Pulse: 85   SpO2: 100%     32yo male comes in for an evaluation of his RIGHT shoulder  He was injured at work on 3-10-22 when some boxes fell off a pallet and onto him  He went to the ER that day and they evaluated him for the LEFT shoulder  The next day, he developed RIGHT shoulder pain and went back to the ER  Xrays were negative for fracture  He has a history of a RIGHT rotator cuff repair done in 2019 in [place]  He has significant pain with attempted right shoulder motion  The following portions of the patient's history were reviewed and updated as appropriate: allergies, current medications, past family history, past medical history, past social history, past surgical history and problem list     Review of Systems  Ortho Exam  History reviewed  No pertinent past medical history  Past Surgical History:   Procedure Laterality Date    BACK SURGERY  2019    lumbar    SHOULDER SURGERY Right     2019     History reviewed  No pertinent family history  Social History     Occupational History    Not on file   Tobacco Use    Smoking status: Current Every Day Smoker    Smokeless tobacco: Never Used   Vaping Use    Vaping Use: Never used   Substance and Sexual Activity    Alcohol use: Not on file    Drug use: Not on file    Sexual activity: Not on file       Review of Systems   Constitutional: Negative  HENT: Negative  Eyes: Negative  Respiratory: Negative  Cardiovascular: Negative  Gastrointestinal: Negative  Endocrine: Negative  Genitourinary: Negative  Musculoskeletal: As below      Allergic/Immunologic: Negative  Neurological: Negative  Hematological: Negative  Psychiatric/Behavioral: Negative  Objective   Physical Exam      I have personally reviewed pertinent films in PACS and my interpretation is no acute displaced fracture on xray  · Constitutional: Awake, Alert, Oriented  · Eyes: EOMI  · Psych: Mood and affect appropriate  · Heart: regular rate   · Lungs: No audible wheezing  · Abdomen: No guarding  · Lymph: no lymphedema       Right Shoulder:  - Appearance   No swelling, discoloration, deformity, or ecchymosis  - Palpation   + tenderness clavicle, + tenderness AC joint, + tenderness biceps tendon, + tenderness proximal humerus  - ROM   Refuses to attempt active ROM and actively resists passive ROM testing citing increased pain  (Pain but full ROM left )  - Motor   Internal and external rotation 4/5 with shoulder at side, flexion - states he can only flex it if he uses the other hand to lift it    (5/5 left)  - Good perfusion distally

## 2022-03-22 NOTE — PROGRESS NOTES
Assessment:  No diagnosis found  Plan:  No orders of the defined types were placed in this encounter  No orders of the defined types were placed in this encounter  My impressions and treatment recommendations were discussed in detail with the patient, who verbalized understanding and had no further questions  ***    {Oral Swab Statement:42136}    {Opioid Statement:93744}    {UDS Statement:07841}    {PDMP Statement:22538}    {Pain Management Procedure Statement:31335}    Follow-up is planned in *** time or sooner as warranted  Discharge instructions were provided  I personally saw and examined the patient and I agree with the above discussed plan of care  History of Present Illness:    Danielito Akbar is a 27 y o  male who presents to [facility] for initial evaluation of the above stated pain complaints  The patient has a past medical and chronic pain history as outlined in the assessment section  {He/she (caps):16658} was referred by Referral [address],  [address] ***     ***    Review of Systems:    Review of Systems   Constitutional: Negative for fever and unexpected weight change  HENT: Negative for trouble swallowing  Eyes: Negative for visual disturbance  Respiratory: Negative for shortness of breath and wheezing  Cardiovascular: Negative for chest pain and palpitations  Gastrointestinal: Negative for constipation, diarrhea, nausea and vomiting  Endocrine: Negative for cold intolerance, heat intolerance and polydipsia  Genitourinary: Negative for difficulty urinating and frequency  Musculoskeletal: Negative for arthralgias, gait problem, joint swelling and myalgias  Skin: Negative for rash  Neurological: Negative for dizziness, seizures, syncope, weakness and headaches  Hematological: Does not bruise/bleed easily  Psychiatric/Behavioral: Negative for dysphoric mood     All other systems reviewed and are negative  There is no problem list on file for this patient  No past medical history on file  Past Surgical History:   Procedure Laterality Date    BACK SURGERY  2019    lumbar    SHOULDER SURGERY Right     2019       No family history on file  Social History     Occupational History    Not on file   Tobacco Use    Smoking status: Current Every Day Smoker    Smokeless tobacco: Never Used   Vaping Use    Vaping Use: Never used   Substance and Sexual Activity    Alcohol use: Not on file    Drug use: Not on file    Sexual activity: Not on file         Current Outpatient Medications:     naproxen (NAPROSYN) 375 mg tablet, Take 1 tablet (375 mg total) by mouth 2 (two) times a day with meals for 20 doses, Disp: 20 tablet, Rfl: 0    Allergies   Allergen Reactions    Morphine Hives       Physical Exam:    There were no vitals taken for this visit  Constitutional: {General Appearance:23549::"normal, well developed, well nourished, alert, in no distress and non-toxic and no overt pain behavior  "}  Eyes: {Sclera:29607::"anicteric"}  HEENT: {Hearin::"grossly intact"}  Neck: {Neck:41052::"supple, symmetric, trachea midline and no masses "}  Pulmonary:{Respiratory effort:92487::"even and unlabored"}  Cardiovascular:{Examination of Extremities:04605::"No edema or pitting edema present"}  Skin:{Skin and Subcutaneous tissues:56630::"Normal without rashes or lesions and well hydrated"}  Psychiatric:{Mood and Affect:24369::"Mood and affect appropriate"}  Neurologic:{Cranial Nerves:06640::"Cranial Nerves II-XII grossly intact"}  Musculoskeletal:{Gait and Station:52270::"normal"}    Imaging  No orders to display   CT THORACIC AND LUMBAR SPINE  03/10/2022     INDICATION:   Back trauma, no prior imaging (Age >= 16y)  Mid-back pain, neuro deficit  midline ttp s/p trauma, left arm pain/tingling      COMPARISON:  Same day CT head without contrast and CT cervical spine without contrast      TECHNIQUE: Contiguous axial images were obtained  Sagittal and coronal reconstructions were performed        Radiation dose length product (DLP) for this visit:  7015 mGy-cm   This examination, like all CT scans performed in the [facility], was performed utilizing techniques to minimize radiation dose exposure, including the use of iterative   reconstruction and automated exposure control         IMAGE QUALITY:  Diagnostic      FINDINGS:     ALIGNMENT:  Normal alignment of the thoracolumbar spine  No subluxation      VERTEBRAL BODIES: No fracture  No compression deformity      DEGENERATIVE CHANGES:  No significant degenerative changes  Scattered calcified ligamentum flavum, most pronounced at T9-T10 and T10-T11  No canal stenosis or foraminal narrowing      PARASPINAL SOFT TISSUES:  Unremarkable      IMPRESSION:     No acute osseous abnormality of thoracic or lumbar spine  CT CERVICAL SPINE - WITHOUT CONTRAST  03/10/2022     INDICATION:   Neck trauma, focal neuro deficit or paresthesia (Age 16-64y); Neck pain after head and neck injury, numbness in left hand      COMPARISON:  None     TECHNIQUE:  CT examination of the cervical spine was performed without intravenous contrast   Contiguous axial images were obtained  Sagittal and coronal reconstructions were performed        Radiation dose length product (DLP) for this visit:  407 mGy-cm  This examination, like all CT scans performed in the [facility], was performed utilizing techniques to minimize radiation dose exposure, including the use of iterative   reconstruction and automated exposure control        IMAGE QUALITY:  Diagnostic      FINDINGS:     ALIGNMENT:  Normal alignment of the cervical spine   No subluxation      VERTEBRAL BODIES:  No fracture      DEGENERATIVE CHANGES:  No significant cervical degenerative changes are noted      PREVERTEBRAL AND PARASPINAL SOFT TISSUES:  Unremarkable      THORACIC INLET: Normal      IMPRESSION:     No cervical spine fracture or traumatic malalignment  No orders of the defined types were placed in this encounter

## 2022-03-23 ENCOUNTER — CONSULT (OUTPATIENT)
Dept: PAIN MEDICINE | Facility: CLINIC | Age: 32
End: 2022-03-23
Payer: OTHER MISCELLANEOUS

## 2022-03-23 VITALS
HEART RATE: 72 BPM | BODY MASS INDEX: 21.13 KG/M2 | HEIGHT: 72 IN | SYSTOLIC BLOOD PRESSURE: 133 MMHG | WEIGHT: 156 LBS | DIASTOLIC BLOOD PRESSURE: 89 MMHG

## 2022-03-23 DIAGNOSIS — M54.6 ACUTE MIDLINE THORACIC BACK PAIN: ICD-10-CM

## 2022-03-23 DIAGNOSIS — M54.42 ACUTE BILATERAL LOW BACK PAIN WITH LEFT-SIDED SCIATICA: ICD-10-CM

## 2022-03-23 DIAGNOSIS — M96.1 LUMBAR POST-LAMINECTOMY SYNDROME: ICD-10-CM

## 2022-03-23 DIAGNOSIS — M54.2 ACUTE NECK PAIN: Primary | ICD-10-CM

## 2022-03-23 PROCEDURE — 99204 OFFICE O/P NEW MOD 45 MIN: CPT | Performed by: ANESTHESIOLOGY

## 2022-03-23 NOTE — PROGRESS NOTES
Assessment:  1  Acute neck pain    2  Acute midline thoracic back pain    3  Acute bilateral low back pain with left-sided sciatica    4  Lumbar post-laminectomy syndrome        Plan:  Orders Placed This Encounter   Procedures    MRI lumbar spine with and without contrast     Standing Status:   Future     Standing Expiration Date:   3/23/2026     Scheduling Instructions: There is no preparation for this test  Please leave your jewelry and valuables at home, wedding rings are the exception  Magnetic nail polish must be removed prior to arrival for your test  Please bring your insurance cards, a form of photo ID and a list of your medications with you  Arrive 15 minutes prior to your appointment time in order to register  Please bring any prior CT or MRI studies of this area that were not performed at a [facility]  To schedule this appointment, please contact Central Scheduling at [phone]  Prior to your appointment, please make sure you complete the MRI Screening Form when you e-Check in for your appointment  This will be available starting 7 days before your appointment in [address]  You may receive an e-mail with an activation code if you do not have a [employer] account  If you do not have access to a device, we will complete your screening at your appointment  Order Specific Question:   What is the patient's sedation requirement? Answer:   Unknown     Order Specific Question:   Release to patient through [employer]     Answer:   Immediate     Order Specific Question:   Is order priority selected as STAT? Answer:   No     Order Specific Question:   Reason for Exam (FREE TEXT)     Answer:   lumbar radiculopathy    MRI thoracic spine without contrast     Standing Status:   Future     Standing Expiration Date:   3/23/2026     Scheduling Instructions:       There is no preparation for this test  Please leave your jewelry and valuables at home, wedding rings are the exception  Magnetic nail polish must be removed prior to arrival for your test  Please bring your insurance cards, a form of photo ID and a list of your medications with you  Arrive 15 minutes prior to your appointment time in order to register  Please bring any prior CT or MRI studies of this area that were not performed at a [facility] facility  To schedule this appointment, please contact Central Scheduling at [phone]  Prior to your appointment, please make sure you complete the MRI Screening Form when you e-Check in for your appointment  This will be available starting 7 days before your appointment in [address]  You may receive an e-mail with an activation code if you do not have a [employer] account  If you do not have access to a device, we will complete your screening at your appointment  Order Specific Question:   What is the patient's sedation requirement? Answer:   Unknown     Order Specific Question:   Release to patient through [employer]     Answer:   Immediate     Order Specific Question:   Is order priority selected as STAT? Answer:   No     Order Specific Question:   Reason for Exam (FREE TEXT)     Answer:   thoracic pain     My impressions and treatment recommendations were discussed in detail with the patient, who verbalized understanding and had no further questions  The patient is reporting an injury at work where a pallet fell on him and hit his head  He was asked to see sports medicine for concussion management by the emergency department, but he has not yet made this appointment  I asked him to make an appointment to see Dr Cristina Forde at today's visit  In addition, he was also asked to see Occupational Medicine and has not made that appointment at this time  I asked him to make the occupational medicine appointment as well since this is a work related injury      At this point, I do feel reasonable to have the patient undergo a MRI of the thoracic spine as well as the MRI of the lumbar spine  He does previously have surgery to the lumbar spine and reports it as a lumbar diskectomy type procedure  I did feel reasonable to have him undergo the MRI of the lumbar spine with and without contrast due to this  He was prescribed physical therapy, but has not yet started physical therapy  I encouraged him to do so  Once I receive the results of the MRI of the thoracic spine as well as the MRI of the lumbar spine, I will discuss further treatment options  Discharge instructions were provided  I personally saw and examined the patient and I agree with the above discussed plan of care  History of Present Illness:    Scarlett Huston is a 27 y o  male who presents to [facility] for initial evaluation of the above stated pain complaints  The patient has a past medical and chronic pain history as outlined in the assessment section  He was self-referred  The patient is reporting a 2 week history of head pain, shoulder pain, thoracolumbar spine pain, and pain in his left lower extremity as well as his left foot  He describes this pain as severe and 10/10 on the verbal numerical pain rating scale  His pain is constant in nature without any typical pattern  He describes his pain as burning, cramping, shooting, numbness, sharp, pins and needles, pressure like, throbbing, dull/aching  He reports weakness in his upper and lower extremities  He does not ambulate with any assistive devices  Lying down and relaxation decreases pain  Prayer, standing, bending, sitting, walking, exercise, and bowel movements increases pain  He is not currently using any pain medications  Review of Systems:    Review of Systems   Constitutional: Negative for fever and unexpected weight change  HENT: Negative for trouble swallowing  Eyes: Positive for pain  Negative for visual disturbance  Respiratory: Negative for shortness of breath and wheezing  Cardiovascular: Negative for chest pain and palpitations  Gastrointestinal: Positive for nausea and vomiting  Negative for constipation and diarrhea  Endocrine: Negative for cold intolerance, heat intolerance and polydipsia  Genitourinary: Negative for difficulty urinating and frequency  Musculoskeletal: Positive for back pain, joint swelling, myalgias, neck pain and neck stiffness  Negative for arthralgias and gait problem  Skin: Negative for rash  Neurological: Positive for dizziness, numbness and headaches  Negative for seizures, syncope and weakness  Hematological: Does not bruise/bleed easily  Psychiatric/Behavioral: Positive for decreased concentration and dysphoric mood  All other systems reviewed and are negative  There is no problem list on file for this patient  Past Medical History:   Diagnosis Date    Chronic back pain        Past Surgical History:   Procedure Laterality Date    BACK SURGERY  2019    lumbar    ORTHOPEDIC SURGERY      SHOULDER SURGERY Right     2019       Family History   Problem Relation Age of Onset    No Known Problems Mother     No Known Problems Father        Social History     Occupational History    Not on file   Tobacco Use    Smoking status: Current Every Day Smoker    Smokeless tobacco: Never Used   Vaping Use    Vaping Use: Never used   Substance and Sexual Activity    Alcohol use: Not on file    Drug use: Not on file    Sexual activity: Not on file         Current Outpatient Medications:     naproxen (NAPROSYN) 375 mg tablet, Take 1 tablet (375 mg total) by mouth 2 (two) times a day with meals for 20 doses, Disp: 20 tablet, Rfl: 0    Allergies   Allergen Reactions    Morphine Hives       Physical Exam:    /89   Pulse 72   Ht 6' (1 829 m)   Wt 70 8 kg (156 lb)   BMI 21 16 kg/m²     Constitutional: normal, well developed, well nourished, alert, in no distress and non-toxic and no overt pain behavior    Eyes: anicteric  HEENT: grossly intact  Neck: supple, symmetric, trachea midline and no masses   Pulmonary:even and unlabored  Cardiovascular:No edema or pitting edema present  Skin:Normal without rashes or lesions and well hydrated  Psychiatric:Mood and affect appropriate  Neurologic:Cranial Nerves II-XII grossly intact  Musculoskeletal:normal    Imaging      Study Result    Narrative & Impression   CT THORACIC AND LUMBAR SPINE     INDICATION:   Back trauma, no prior imaging (Age >= 16y)  Mid-back pain, neuro deficit  midline ttp s/p trauma, left arm pain/tingling      COMPARISON:  Same day CT head without contrast and CT cervical spine without contrast      TECHNIQUE:  Contiguous axial images were obtained  Sagittal and coronal reconstructions were performed        Radiation dose length product (DLP) for this visit:  3284 mGy-cm   This examination, like all CT scans performed in the [facility], was performed utilizing techniques to minimize radiation dose exposure, including the use of iterative   reconstruction and automated exposure control         IMAGE QUALITY:  Diagnostic      FINDINGS:     ALIGNMENT:  Normal alignment of the thoracolumbar spine  No subluxation      VERTEBRAL BODIES: No fracture  No compression deformity      DEGENERATIVE CHANGES:  No significant degenerative changes  Scattered calcified ligamentum flavum, most pronounced at T9-T10 and T10-T11  No canal stenosis or foraminal narrowing      PARASPINAL SOFT TISSUES:  Unremarkable      IMPRESSION:     No acute osseous abnormality of thoracic or lumbar spine            Workstation performed: [id number]        Study Result    Narrative & Impression   CT CERVICAL SPINE - WITHOUT CONTRAST     INDICATION:   Neck trauma, focal neuro deficit or paresthesia (Age 12-57y);  Neck pain after head and neck injury, numbness in left hand      COMPARISON:  None     TECHNIQUE:  CT examination of the cervical spine was performed without intravenous contrast   Contiguous axial images were obtained  Sagittal and coronal reconstructions were performed        Radiation dose length product (DLP) for this visit:  407 mGy-cm  This examination, like all CT scans performed in the [facility], was performed utilizing techniques to minimize radiation dose exposure, including the use of iterative   reconstruction and automated exposure control        IMAGE QUALITY:  Diagnostic      FINDINGS:     ALIGNMENT:  Normal alignment of the cervical spine   No subluxation      VERTEBRAL BODIES:  No fracture      DEGENERATIVE CHANGES:  No significant cervical degenerative changes are noted      PREVERTEBRAL AND PARASPINAL SOFT TISSUES:  Unremarkable      THORACIC INLET:  Normal      IMPRESSION:     No cervical spine fracture or traumatic malalignment                  Workstation performed: [id number]              MRI lumbar spine with and without contrast    (Results Pending)   MRI thoracic spine without contrast    (Results Pending)       Orders Placed This Encounter   Procedures    MRI lumbar spine with and without contrast    MRI thoracic spine without contrast

## 2022-03-23 NOTE — PATIENT INSTRUCTIONS
Magnetic Resonance Imaging   WHAT YOU NEED TO KNOW:   What do I need to know about magnetic resonance imaging (MRI)? An MRI is a test that uses magnetic fields and radio waves to take pictures inside your body  An MRI is used to see blood vessels, tissue, muscles, and bones  It can also show organs, such as your heart, lungs, or liver  An MRI can help your healthcare provider diagnose or treat a medical condition  It does not use radiation  How do I prepare for an MRI? · Your healthcare provider will tell you which medicines to take or not take on the day of your MRI  He or she may give you medicine to help you feel calm and relaxed during the MRI  · Tell your provider if you know or think you are pregnant  · Tell your provider if you have any metal in your body, such as a pacemaker, implant, or aneurism clip  Tell him or her if you have a tattoo or wear a medicine patch  · Remove any metal items, such as hair clips, jewelry, glasses, hearing aids, or dentures before you enter the MRI room  What will happen during an MRI? Your healthcare provider will ask you to lie on a table  Contrast liquid may be used to help a body part show up more clearly  The table will be moved into an open space in the middle of the machine  You will need to lie still during the MRI  It is normal to hear knocking, thumping, or clicking noises from the machine  What are the risks of an MRI? An MRI may cause a metal object in your body to move out of place and cause serious injury, or stop working properly  The contrast liquid may cause nausea, a headache, lightheadedness, or pain at the injection site  You could have an allergic reaction to the contrast liquid  CARE AGREEMENT:   You have the right to help plan your care  Learn about your health condition and how it may be treated  Discuss treatment options with your healthcare providers to decide what care you want to receive   You always have the right to refuse treatment  The above information is an  only  It is not intended as medical advice for individual conditions or treatments  Talk to your doctor, nurse or pharmacist before following any medical regimen to see if it is safe and effective for you  © Copyright [employer] 2022 Information is for End User's use only and may not be sold, redistributed or otherwise used for commercial purposes   All illustrations and images included in CareNotes® are the copyrighted property of A D A M , Inc  or [address] [place] [address]

## 2022-03-29 ENCOUNTER — TELEPHONE (OUTPATIENT)
Dept: OBGYN CLINIC | Facility: HOSPITAL | Age: 32
End: 2022-03-29

## 2022-03-29 NOTE — TELEPHONE ENCOUNTER
Patient is scheduled for an MRI of his right shoulder, he states that it should be  for both shoulders  Please advise      CB# [phone]

## 2022-03-30 ENCOUNTER — HOSPITAL ENCOUNTER (OUTPATIENT)
Dept: RADIOLOGY | Facility: HOSPITAL | Age: 32
Discharge: HOME | End: 2022-03-30
Payer: OTHER MISCELLANEOUS

## 2022-03-30 DIAGNOSIS — R29.898 SHOULDER WEAKNESS: ICD-10-CM

## 2022-03-30 DIAGNOSIS — M25.611 DECREASED ROM OF RIGHT SHOULDER: ICD-10-CM

## 2022-03-30 PROCEDURE — G1004 CDSM NDSC: HCPCS

## 2022-03-30 PROCEDURE — 73221 MRI JOINT UPR EXTREM W/O DYE: CPT

## 2022-04-04 NOTE — TELEPHONE ENCOUNTER
Patient calling back today stating that he called in the other day in regards to having an MRI for both of his shoulders  He states he only had an MRI of the right shoulder and was under the impression it was supposed to be for both, but the left MRI was never issued  Please advise       cb # [phone]

## 2022-04-06 ENCOUNTER — TELEPHONE (OUTPATIENT)
Dept: PAIN MEDICINE | Facility: CLINIC | Age: 32
End: 2022-04-06

## 2022-04-06 NOTE — TELEPHONE ENCOUNTER
Patient   [phone]  Dr Wright Neither    Patient is calling in about his MRI results   Please follow up with results

## 2022-04-07 ENCOUNTER — HOSPITAL ENCOUNTER (OUTPATIENT)
Dept: MRI IMAGING | Facility: HOSPITAL | Age: 32
Discharge: HOME | End: 2022-04-07
Attending: ANESTHESIOLOGY
Payer: OTHER MISCELLANEOUS

## 2022-04-07 DIAGNOSIS — M96.1 LUMBAR POST-LAMINECTOMY SYNDROME: ICD-10-CM

## 2022-04-07 DIAGNOSIS — M54.6 ACUTE MIDLINE THORACIC BACK PAIN: ICD-10-CM

## 2022-04-07 DIAGNOSIS — M54.42 ACUTE BILATERAL LOW BACK PAIN WITH LEFT-SIDED SCIATICA: ICD-10-CM

## 2022-04-07 PROCEDURE — 72146 MRI CHEST SPINE W/O DYE: CPT

## 2022-04-07 PROCEDURE — G1004 CDSM NDSC: HCPCS

## 2022-04-07 PROCEDURE — 72148 MRI LUMBAR SPINE W/O DYE: CPT

## 2022-04-07 PROCEDURE — A9585 GADOBUTROL INJECTION: HCPCS | Performed by: ANESTHESIOLOGY

## 2022-04-07 RX ADMIN — GADOBUTROL 7 ML: 604.72 INJECTION INTRAVENOUS at 09:43

## 2022-04-11 ENCOUNTER — OFFICE VISIT (OUTPATIENT)
Dept: OBGYN CLINIC | Facility: CLINIC | Age: 32
End: 2022-04-11
Payer: OTHER MISCELLANEOUS

## 2022-04-11 VITALS
HEART RATE: 86 BPM | SYSTOLIC BLOOD PRESSURE: 132 MMHG | DIASTOLIC BLOOD PRESSURE: 87 MMHG | HEIGHT: 72 IN | BODY MASS INDEX: 21.13 KG/M2 | WEIGHT: 156 LBS

## 2022-04-11 DIAGNOSIS — M25.511 ACUTE PAIN OF RIGHT SHOULDER: ICD-10-CM

## 2022-04-11 DIAGNOSIS — M75.01 ADHESIVE CAPSULITIS OF RIGHT SHOULDER: Primary | ICD-10-CM

## 2022-04-11 DIAGNOSIS — M75.51 SUBACROMIAL BURSITIS OF RIGHT SHOULDER JOINT: ICD-10-CM

## 2022-04-11 PROCEDURE — 99213 OFFICE O/P EST LOW 20 MIN: CPT | Performed by: ORTHOPAEDIC SURGERY

## 2022-04-11 NOTE — PROGRESS NOTES
Patient Name:  Rogers Leyva  MRN:  [id number]    Assessment & Plan     1  Adhesive capsulitis of right shoulder  -     Ambulatory Referral to Physical Therapy; Future    2  Subacromial bursitis of right shoulder joint  -     Ambulatory Referral to Physical Therapy; Future    3  Acute pain of right shoulder  -     Ambulatory Referral to Physical Therapy; Future      27-year-old male with right shoulder subacromial bursitis wit hearly adhesive capsulitis due to immobilization  s/p work related injury sustained on 3/10/2022  MRI was discussed with him in the office today, grossly unremarkable other than subacromial and subdeltoid bursitis  He is now dealing with significant stiffness in the shoulder and elbow from being immobilized in the sling for about 1 month so would like to get him into physical therapy to start working on ROM, strengthening and postural exercises  He was also shown home exercises in the office today  He is to discontinue the sling completely  Will give him a 5 lb lifting restriction for the next 2 weeks at work but after that may return to all duties to his tolerance  Will defer to pain management and occupationa medicine regarding any restrictions in regards to his back  OTC analgesics as needed for pain  Ice/heat as directed  Will plan to see him back in 6 weeks for repeat clinical evaluation  If no improvement can consider steroid injection at that time  Chief Complaint     Left shoulder pain    History of the Present Illness     Rogers Leyva is a 27 y o  male with right shoulder  Patient states on 03/10/2022 six 50lb boxes fell onto him at work  He had immediate onset of pain in his right shoulder as well as his back (currently seeing Dr Codie Brath for his back)  He has had a significant amount of pain in the shoulder and very limited ROM since the injury  Denies numbness and tingling   He was initially evaluated at the ED but then followed up with Prem Romero on 3/14/2022 who sent him for a right shoulder MRI  He has been wearing a sling since the injury  He does have a history of prior shoulder surgery (possible rotator cuff repair but patient is unsure) in 2019  This is a worker's compensation case  Patient works in a distribution center  Review of Systems     Review of Systems   Constitutional: Negative for appetite change and unexpected weight change  HENT: Negative for congestion and trouble swallowing  Eyes: Negative for visual disturbance  Respiratory: Negative for cough and shortness of breath  Cardiovascular: Negative for chest pain and palpitations  Gastrointestinal: Negative for nausea and vomiting  Endocrine: Negative for cold intolerance and heat intolerance  Musculoskeletal: Negative for gait problem and myalgias  Skin: Negative for rash  Neurological: Negative for numbness  Physical Exam     /87   Pulse 86   Ht 6' (1 829 m)   Wt 70 8 kg (156 lb)   BMI 21 16 kg/m²     Right Shoulder: Active range of motion   30 degrees forward flexion  20 degrees abduction  0 degrees external rotation   SI joint internal rotation    Elbow 0-90 actively with biceps pain    Passive range of motion   60 degrees of forward flexion   There is tenderness present over the rhomboid, distal clavicle, biceps  There is 4+/5 strength with external rotation testing at the side  Special testing deferred due to pain and guarding  The patient is neurovascularly intact distally in the extremity  Eyes:  Anicteric sclerae  Neck:  Supple  Lungs:  Normal respiratory effort  Cardiovascular:  Capillary refill is less than 2 seconds  Skin:  Intact without erythema  Neurologic:  Sensation grossly intact to light touch  Psychiatric:  Mood and affect are appropriate      Data Review     I have personally reviewed pertinent films in PACS, and my interpretation follows:    X-rays of the right shoulder performed 3/11/2022 demonstrates no acute osseous abnormalities or significant degenerative changes  MRI of the right shoulder performed 3/30/2022 demonstrates mild subacromial and subdeltoid bursitis  Rotator cuff intact  Past Medical History:   Diagnosis Date    Chronic back pain        Past Surgical History:   Procedure Laterality Date    BACK SURGERY  2019    lumbar    ORTHOPEDIC SURGERY      SHOULDER SURGERY Right     2019       Allergies   Allergen Reactions    Morphine Hives       Current Outpatient Medications on File Prior to Visit   Medication Sig Dispense Refill    naproxen (NAPROSYN) 375 mg tablet Take 1 tablet (375 mg total) by mouth 2 (two) times a day with meals for 20 doses 20 tablet 0     No current facility-administered medications on file prior to visit  Social History     Tobacco Use    Smoking status: Current Every Day Smoker    Smokeless tobacco: Never Used   Vaping Use    Vaping Use: Never used   Substance Use Topics    Alcohol use: Never    Drug use: Never       Family History   Problem Relation Age of Onset    No Known Problems Mother     No Known Problems Father          Procedures Performed     No procedures performed today      Scribe Attestation    I,:  Hilda Tai am acting as a scribe while in the presence of the attending physician :       I,:  Checo Brink DO personally performed the services described in this documentation    as scribed in my presence :

## 2022-04-11 NOTE — LETTER
April 11, 2022     Patient: Delaney Medley   YOB: 1990   Date of Visit: 4/11/2022       To Whom it May Concern:    Delaney Medley is under my professional care  He was seen in my office on 4/11/2022  He may return to work with the following restrictions: no lifting, pushing or pulling more than 5 lbs for the next 2 weeks  After 2 weeks may return to all duties without restrictions  If you have any questions or concerns, please don't hesitate to call           Sincerely,          Lisandra Valdez DO        CC: No Recipients

## 2022-04-12 ENCOUNTER — TELEPHONE (OUTPATIENT)
Dept: OBGYN CLINIC | Facility: HOSPITAL | Age: 32
End: 2022-04-12

## 2022-04-12 NOTE — TELEPHONE ENCOUNTER
Patient seen Maye Luu  The patient was supposed to have an MRI of his left shoulder along with the right shoulder  The right shoulder/arm was much more than the left but they were both still supposed to be done under the WC  The patient was seen yesterday in the office with Dr Baljinder Madrigal and he advised the patient that he would have to go back to the PA relating the left shoulder MRI since it was not done  The patient is needing an MRI to be completed of the left shoulder as well and is asking what further he can do  Please advise          Call back# [phone]

## 2022-04-13 NOTE — TELEPHONE ENCOUNTER
I spoke with patient and information above relayed  He scheduled appt for Saturday at 8am for L shoulder evaluation  He would like to be seen for sooner appt    I will try for appt tomorrow at 4pm

## 2022-04-14 ENCOUNTER — HOSPITAL ENCOUNTER (OUTPATIENT)
Dept: RADIOLOGY | Facility: HOSPITAL | Age: 32
Discharge: HOME | End: 2022-04-14

## 2022-04-14 ENCOUNTER — TELEPHONE (OUTPATIENT)
Dept: NEUROLOGY | Facility: CLINIC | Age: 32
End: 2022-04-14

## 2022-04-14 ENCOUNTER — HOSPITAL ENCOUNTER (EMERGENCY)
Facility: HOSPITAL | Age: 32
Discharge: HOME | End: 2022-04-15
Attending: EMERGENCY MEDICINE
Payer: OTHER MISCELLANEOUS

## 2022-04-14 ENCOUNTER — APPOINTMENT (OUTPATIENT)
Dept: RADIOLOGY | Facility: HOSPITAL | Age: 32
End: 2022-04-14
Payer: OTHER MISCELLANEOUS

## 2022-04-14 ENCOUNTER — OFFICE VISIT (OUTPATIENT)
Dept: OBGYN CLINIC | Facility: CLINIC | Age: 32
End: 2022-04-14
Payer: OTHER MISCELLANEOUS

## 2022-04-14 VITALS
BODY MASS INDEX: 20.78 KG/M2 | DIASTOLIC BLOOD PRESSURE: 65 MMHG | HEIGHT: 72 IN | OXYGEN SATURATION: 97 % | SYSTOLIC BLOOD PRESSURE: 112 MMHG | TEMPERATURE: 98.2 F | HEART RATE: 75 BPM | RESPIRATION RATE: 16 BRPM

## 2022-04-14 VITALS — HEART RATE: 94 BPM | BODY MASS INDEX: 20.75 KG/M2 | OXYGEN SATURATION: 99 % | HEIGHT: 72 IN | WEIGHT: 153.2 LBS

## 2022-04-14 DIAGNOSIS — M54.16 LUMBAR BACK PAIN WITH RADICULOPATHY AFFECTING LEFT LOWER EXTREMITY: Primary | ICD-10-CM

## 2022-04-14 DIAGNOSIS — R29.898 SHOULDER WEAKNESS: ICD-10-CM

## 2022-04-14 DIAGNOSIS — M75.82 TENDINITIS OF LEFT ROTATOR CUFF: ICD-10-CM

## 2022-04-14 DIAGNOSIS — M25.512 LEFT SHOULDER PAIN, UNSPECIFIED CHRONICITY: ICD-10-CM

## 2022-04-14 DIAGNOSIS — M25.512 ACUTE PAIN OF LEFT SHOULDER: Primary | ICD-10-CM

## 2022-04-14 DIAGNOSIS — R32 URINARY INCONTINENCE, UNSPECIFIED TYPE: ICD-10-CM

## 2022-04-14 DIAGNOSIS — N39.498 OTHER URINARY INCONTINENCE: ICD-10-CM

## 2022-04-14 LAB
ANION GAP SERPL CALCULATED.3IONS-SCNC: 8 MMOL/L (ref 4–13)
BASOPHILS # BLD AUTO: 0.06 THOUSANDS/ΜL (ref 0–0.1)
BASOPHILS NFR BLD AUTO: 1 % (ref 0–1)
BUN SERPL-MCNC: 12 MG/DL (ref 5–25)
CALCIUM SERPL-MCNC: 9.1 MG/DL (ref 8.4–10.2)
CHLORIDE SERPL-SCNC: 103 MMOL/L (ref 96–108)
CO2 SERPL-SCNC: 26 MMOL/L (ref 21–32)
CREAT SERPL-MCNC: 1.08 MG/DL (ref 0.6–1.3)
EOSINOPHIL # BLD AUTO: 0.2 THOUSAND/ΜL (ref 0–0.61)
EOSINOPHIL NFR BLD AUTO: 2 % (ref 0–6)
ERYTHROCYTE [DISTWIDTH] IN BLOOD BY AUTOMATED COUNT: 12.3 % (ref 11.6–15.1)
GFR SERPL CREATININE-BSD FRML MDRD: 90 ML/MIN/1.73SQ M
GLUCOSE SERPL-MCNC: 104 MG/DL (ref 65–140)
HCT VFR BLD AUTO: 47.8 % (ref 36.5–49.3)
HGB BLD-MCNC: 16.4 G/DL (ref 12–17)
IMM GRANULOCYTES # BLD AUTO: 0.03 THOUSAND/UL (ref 0–0.2)
IMM GRANULOCYTES NFR BLD AUTO: 0 % (ref 0–2)
LYMPHOCYTES # BLD AUTO: 1.47 THOUSANDS/ΜL (ref 0.6–4.47)
LYMPHOCYTES NFR BLD AUTO: 13 % (ref 14–44)
MCH RBC QN AUTO: 31 PG (ref 26.8–34.3)
MCHC RBC AUTO-ENTMCNC: 34.3 G/DL (ref 31.4–37.4)
MCV RBC AUTO: 90 FL (ref 82–98)
MONOCYTES # BLD AUTO: 0.6 THOUSAND/ΜL (ref 0.17–1.22)
MONOCYTES NFR BLD AUTO: 5 % (ref 4–12)
NEUTROPHILS # BLD AUTO: 8.74 THOUSANDS/ΜL (ref 1.85–7.62)
NEUTS SEG NFR BLD AUTO: 79 % (ref 43–75)
NRBC BLD AUTO-RTO: 0 /100 WBCS
PLATELET # BLD AUTO: 188 THOUSANDS/UL (ref 149–390)
PMV BLD AUTO: 9.7 FL (ref 8.9–12.7)
POTASSIUM SERPL-SCNC: 3.8 MMOL/L (ref 3.5–5.3)
RBC # BLD AUTO: 5.29 MILLION/UL (ref 3.88–5.62)
SODIUM SERPL-SCNC: 137 MMOL/L (ref 135–147)
WBC # BLD AUTO: 11.1 THOUSAND/UL (ref 4.31–10.16)

## 2022-04-14 PROCEDURE — 96374 THER/PROPH/DIAG INJ IV PUSH: CPT

## 2022-04-14 PROCEDURE — 72148 MRI LUMBAR SPINE W/O DYE: CPT

## 2022-04-14 PROCEDURE — 99285 EMERGENCY DEPT VISIT HI MDM: CPT | Performed by: EMERGENCY MEDICINE

## 2022-04-14 PROCEDURE — 85025 COMPLETE CBC W/AUTO DIFF WBC: CPT | Performed by: EMERGENCY MEDICINE

## 2022-04-14 PROCEDURE — 99214 OFFICE O/P EST MOD 30 MIN: CPT | Performed by: PHYSICIAN ASSISTANT

## 2022-04-14 PROCEDURE — 36415 COLL VENOUS BLD VENIPUNCTURE: CPT | Performed by: EMERGENCY MEDICINE

## 2022-04-14 PROCEDURE — 51798 US URINE CAPACITY MEASURE: CPT

## 2022-04-14 PROCEDURE — 99284 EMERGENCY DEPT VISIT MOD MDM: CPT

## 2022-04-14 PROCEDURE — G1004 CDSM NDSC: HCPCS

## 2022-04-14 PROCEDURE — 96375 TX/PRO/DX INJ NEW DRUG ADDON: CPT

## 2022-04-14 PROCEDURE — 80048 BASIC METABOLIC PNL TOTAL CA: CPT | Performed by: EMERGENCY MEDICINE

## 2022-04-14 RX ORDER — ONDANSETRON 2 MG/ML
4 INJECTION INTRAMUSCULAR; INTRAVENOUS ONCE
Status: COMPLETED | OUTPATIENT
Start: 2022-04-14 | End: 2022-04-14

## 2022-04-14 RX ORDER — OXYCODONE HYDROCHLORIDE AND ACETAMINOPHEN 5; 325 MG/1; MG/1
1 TABLET ORAL ONCE
Status: COMPLETED | OUTPATIENT
Start: 2022-04-14 | End: 2022-04-14

## 2022-04-14 RX ORDER — HYDROMORPHONE HCL/PF 1 MG/ML
0.5 SYRINGE (ML) INJECTION ONCE
Status: COMPLETED | OUTPATIENT
Start: 2022-04-14 | End: 2022-04-14

## 2022-04-14 RX ORDER — ACETAMINOPHEN, ASPIRIN AND CAFFEINE 250; 250; 65 MG/1; MG/1; MG/1
1 TABLET, FILM COATED ORAL EVERY 6 HOURS PRN
COMMUNITY

## 2022-04-14 RX ADMIN — OXYCODONE AND ACETAMINOPHEN 1 TABLET: 5; 325 TABLET ORAL at 16:44

## 2022-04-14 RX ADMIN — HYDROMORPHONE HYDROCHLORIDE 0.5 MG: 1 INJECTION, SOLUTION INTRAMUSCULAR; INTRAVENOUS; SUBCUTANEOUS at 23:24

## 2022-04-14 RX ADMIN — ONDANSETRON 4 MG: 2 INJECTION INTRAMUSCULAR; INTRAVENOUS at 23:23

## 2022-04-14 NOTE — ED NOTES
Patient scheduled for MRI at [facility]  To be transported by [facility] between 2030 and 2100  Please call MRI when transport here to  patient       MRI - [phone]     Esequiel Mahoney RN  04/14/22 5173

## 2022-04-14 NOTE — EMTALA/ACUTE CARE TRANSFER
[facility] EMERGENCY DEPARTMENT  [address] [facility] [address]  Dept: [phone]      EMTALA TRANSFER CONSENT    NAME Catrina العلي                                         1990                              MRN [id number]    I have been informed of my rights regarding examination, treatment, and transfer   by Dr Ashia Jaquez MD    Benefits: Specialized equipment and/or services available at the receiving facility (Include comment)________________________ (MRI)    Risks: Potential for delay in receiving treatment,Potential deterioration of medical condition,Loss of IV,Increased discomfort during transfer,Possible worsening of condition or death during transfer      Consent for Transfer:  I acknowledge that my medical condition has been evaluated and explained to me by the emergency department physician or other qualified medical person and/or my attending physician, who has recommended that I be transferred to the service of    at    The above potential benefits of such transfer, the potential risks associated with such transfer, and the probable risks of not being transferred have been explained to me, and I fully understand them  The doctor has explained that, in my case, the benefits of transfer outweigh the risks  I agree to be transferred  I authorize the performance of emergency medical procedures and treatments upon me in both transit and upon arrival at the receiving facility  Additionally, I authorize the release of any and all medical records to the receiving facility and request they be transported with me, if possible  I understand that the safest mode of transportation during a medical emergency is an ambulance and that the Hospital advocates the use of this mode of transport   Risks of traveling to the receiving facility by car, including absence of medical control, life sustaining equipment, such as oxygen, and medical personnel has been explained to me and I fully understand them  (ELYSSA CORRECT BOX BELOW)  [  ]  I consent to the stated transfer and to be transported by ambulance/helicopter  [  ]  I consent to the stated transfer, but refuse transportation by ambulance and accept full responsibility for my transportation by car  I understand the risks of non-ambulance transfers and I exonerate the Hospital and its staff from any deterioration in my condition that results from this refusal     X___________________________________________    DATE  22  TIME________  Signature of patient or legally responsible individual signing on patient behalf           RELATIONSHIP TO PATIENT_________________________          Provider Certification    NAME Alexander Ordonez                                         1990                              MRN [id number]    A medical screening exam was performed on the above named patient  Based on the examination:    Condition Necessitating Transfer The primary encounter diagnosis was Lumbar back pain with radiculopathy affecting left lower extremity  A diagnosis of Urinary incontinence, unspecified type was also pertinent to this visit      Patient Condition: The patient has been stabilized such that within reasonable medical probability, no material deterioration of the patient condition or the condition of the unborn child(teagan) is likely to result from the transfer    Reason for Transfer: Level of Care needed not available at this facility (MRI)    Transfer Requirements: Facility     · Space available and qualified personnel available for treatment as acknowledged by    · Agreed to accept transfer and to provide appropriate medical treatment as acknowledged by          · Appropriate medical records of the examination and treatment of the patient are provided at the time of transfer   [address] _______  · Transfer will be performed by qualified personnel from    and appropriate transfer equipment as required, including the use of necessary and appropriate life support measures  Provider Certification: I have examined the patient and explained the following risks and benefits of being transferred/refusing transfer to the patient/family:  General risk, such as traffic hazards, adverse weather conditions, rough terrain or turbulence, possible failure of equipment (including vehicle or aircraft), or consequences of actions of persons outside the control of the transport personnel,Unanticipated needs of medical equipment and personnel during transport,Risk of worsening condition,The possibility of a transport vehicle being unavailable      Based on these reasonable risks and benefits to the patient and/or the unborn child(teagan), and based upon the information available at the time of the patients examination, I certify that the medical benefits reasonably to be expected from the provision of appropriate medical treatments at another medical facility outweigh the increasing risks, if any, to the individuals medical condition, and in the case of labor to the unborn child, from effecting the transfer      X____________________________________________ DATE 04/14/22        TIME_______      ORIGINAL - SEND TO MEDICAL RECORDS   COPY - SEND WITH PATIENT DURING TRANSFER

## 2022-04-14 NOTE — Clinical Note
Linda Altman should be transferred out to [facility] for MRI and return after MRI

## 2022-04-14 NOTE — TELEPHONE ENCOUNTER
Called and spoke to patient reminding him of his upcoming appointment on 04/22/22 with Dr Amira Lovett

## 2022-04-14 NOTE — ED PROVIDER NOTES
History  Chief Complaint   Patient presents with    Numbness     patient reports left foot numbness "for a couple days, about a week" "Today I stood up and got a pain in my butt  It felt like it was in my asshole "  Patient seen orthopedics today and referred to come in for an eval      The patient reports that he had a workplace accident on the 10th of March when Pallet fell on him  He reports that since then he has had some back pain rating down the left leg  Became much worse 10 days ago  He was seen by pain management in the ordered MRI of his T and L-spine which was completed 1 week ago although he was not able to remain in the MRI machine due to pain for long enough to get the MRI with contrast that was ordered  He reports that for the past 10 days he has been getting intermittent numbness in his right toe  He reports he has difficult time moving it  This escalated over the past day  He went to see his orthopedic doctor today and when getting up from standing while in the waiting room he had a large amount of urinary incontinence required him to get new pants  He tells me he did not feel that he was about to urinate  He denies any numb sensation in his penis or rectum but does report severe rectal pain that radiates down to his leg  He does note prior back surgery in 2019  He denies fevers or chills  Denies IV drug use  Prior to Admission Medications   Prescriptions Last Dose Informant Patient Reported? Taking?    Ibuprofen (MOTRIN PO) 4/13/2022 at Unknown time  Yes Yes   Sig: Take by mouth   aspirin-acetaminophen-caffeine (EXCEDRIN MIGRAINE) 250-250-65 MG per tablet 4/13/2022 at Unknown time  Yes Yes   Sig: Take 1 tablet by mouth every 6 (six) hours as needed for headaches   naproxen (NAPROSYN) 375 mg tablet Past Week at Unknown time Self No Yes   Sig: Take 1 tablet (375 mg total) by mouth 2 (two) times a day with meals for 20 doses      Facility-Administered Medications: None       Past Medical History:   Diagnosis Date    Chronic back pain        Past Surgical History:   Procedure Laterality Date    BACK SURGERY  2019    lumbar    ORTHOPEDIC SURGERY      SHOULDER SURGERY Right     2019       Family History   Problem Relation Age of Onset    No Known Problems Mother     No Known Problems Father      I have reviewed and agree with the history as documented  E-Cigarette/Vaping    E-Cigarette Use Never User      E-Cigarette/Vaping Substances    Nicotine No     THC No     CBD No     Flavoring No     Other No     Unknown No      Social History     Tobacco Use    Smoking status: Current Every Day Smoker    Smokeless tobacco: Never Used   Vaping Use    Vaping Use: Never used   Substance Use Topics    Alcohol use: Never    Drug use: Never       Review of Systems   All other systems reviewed and are negative  Physical Exam  Physical Exam  Vitals and nursing note reviewed  Constitutional:       Appearance: He is well-developed  HENT:      Head: Normocephalic and atraumatic  Eyes:      Conjunctiva/sclera: Conjunctivae normal    Cardiovascular:      Rate and Rhythm: Normal rate and regular rhythm  Heart sounds: No murmur heard  Pulmonary:      Effort: Pulmonary effort is normal  No respiratory distress  Breath sounds: Normal breath sounds  Abdominal:      Palpations: Abdomen is soft  Tenderness: There is no abdominal tenderness  Musculoskeletal:      Cervical back: Neck supple  Skin:     General: Skin is warm and dry  Capillary Refill: Capillary refill takes 2 to 3 seconds  Neurological:      Mental Status: He is alert and oriented to person, place, and time  Sensory: Sensory deficit (diminished sensation medial foot and 1-2 toe interpace on elft  NO saddle anesthesia ) present  Comments: On left: 1/5 EHL , 1-5 dorsiflexion on the left, 1/5 plantar flexion on the left    4/5 knee extension, 4/5 hip flexion,4/5 hip extension  On right: 4/5 EHL, 4/5 dorsiflexion, 4/5 plantar flexion, 5/5 knee extension, 4/5 hip extension, 4/5 hip extension  Strength exam limited by pain           Vital Signs  ED Triage Vitals [04/14/22 1618]   Temperature Pulse Respirations Blood Pressure SpO2   98 2 °F (36 8 °C) 84 18 139/79 100 %      Temp Source Heart Rate Source Patient Position - Orthostatic VS BP Location FiO2 (%)   Oral Monitor Sitting Left arm --      Pain Score       10 - Worst Possible Pain           Vitals:    04/14/22 1618   BP: 139/79   Pulse: 84   Patient Position - Orthostatic VS: Sitting         Visual Acuity  Visual Acuity      Most Recent Value   L Pupil Size (mm) 3   R Pupil Size (mm) 3          ED Medications  Medications   oxyCODONE-acetaminophen (PERCOCET) 5-325 mg per tablet 1 tablet (1 tablet Oral Given 4/14/22 1644)       Diagnostic Studies  Results Reviewed     Procedure Component Value Units Date/Time    Basic metabolic panel [[id number]] Collected: 04/14/22 1726    Lab Status: Final result Specimen: Blood from Arm, Left Updated: 04/14/22 1748     Sodium 137 mmol/L      Potassium 3 8 mmol/L      Chloride 103 mmol/L      CO2 26 mmol/L      ANION GAP 8 mmol/L      BUN 12 mg/dL      Creatinine 1 08 mg/dL      Glucose 104 mg/dL      Calcium 9 1 mg/dL      eGFR 90 ml/min/1 73sq m     Narrative:      Sheila guidelines for Chronic Kidney Disease (CKD):     Stage 1 with normal or high GFR (GFR > 90 mL/min/1 73 square meters)    Stage 2 Mild CKD (GFR = 60-89 mL/min/1 73 square meters)    Stage 3A Moderate CKD (GFR = 45-59 mL/min/1 73 square meters)    Stage 3B Moderate CKD (GFR = 30-44 mL/min/1 73 square meters)    Stage 4 Severe CKD (GFR = 15-29 mL/min/1 73 square meters)    Stage 5 End Stage CKD (GFR <15 mL/min/1 73 square meters)  Note: GFR calculation is accurate only with a steady state creatinine    CBC and differential [[id number]]  (Abnormal) Collected: 04/14/22 1726    Lab Status: Final result Specimen: Blood from Arm, Left Updated: 04/14/22 1730     WBC 11 10 Thousand/uL      RBC 5 29 Million/uL      Hemoglobin 16 4 g/dL      Hematocrit 47 8 %      MCV 90 fL      MCH 31 0 pg      MCHC 34 3 g/dL      RDW 12 3 %      MPV 9 7 fL      Platelets 683 Thousands/uL      nRBC 0 /100 WBCs      Neutrophils Relative 79 %      Immat GRANS % 0 %      Lymphocytes Relative 13 %      Monocytes Relative 5 %      Eosinophils Relative 2 %      Basophils Relative 1 %      Neutrophils Absolute 8 74 Thousands/µL      Immature Grans Absolute 0 03 Thousand/uL      Lymphocytes Absolute 1 47 Thousands/µL      Monocytes Absolute 0 60 Thousand/µL      Eosinophils Absolute 0 20 Thousand/µL      Basophils Absolute 0 06 Thousands/µL                  MRI lumbar spine wo contrast    (Results Pending)              Procedures  Procedures         ED Course  ED Course as of 04/14/22 2247   u Apr 14, 2022   1658 I evaluated patient  I ordered post-void residual and will consult with neurosurgery in light of concern for cauda equina syndrome in context of MRI from 1 week ago  Exam shows profound weakness of EHL and left foot but exam is limited by pain  1714 Case discussed with Dr Amada Jenkins, neurosurgery on call, who advised patient required repeat MRI  I consulted radiologist Dr Cherri Menjivar, who approved MRI L spine with and without contrast    1820 Per protocol, patient will be sent to outside hospital for MRI as emergent MRI is unavailable at [facility]  Awaiting transport and testing  2245 Case signed out to Dr Jose Bustos pending re-evaluation and MRI results  SBIRT 20yo+      Most Recent Value   SBIRT (22 yo +)    In order to provide better care to our patients, we are screening all of our patients for alcohol and drug use  Would it be okay to ask you these screening questions?  No Filed at: 04/14/2022 1646                    [facility]  Number of Diagnoses or Management Options  Diagnosis management comments: Evaluate patient  Other urinary continence is concerning for cauda equina syndrome, patient just had MRI 1 week ago  Nevertheless, he does report his pain and weakness have escalated over the past days so will discuss with Neurosurgery  Amount and/or Complexity of Data Reviewed  Clinical lab tests: ordered and reviewed  Tests in the radiology section of CPT®: ordered        Disposition  Final diagnoses:   Lumbar back pain with radiculopathy affecting left lower extremity   Urinary incontinence, unspecified type     Time reflects when diagnosis was documented in both MDM as applicable and the Disposition within this note     Time User Action Codes Description Comment    4/14/2022  6:19 PM Laura Ped Add [M54 16] Lumbar back pain with radiculopathy affecting left lower extremity     4/14/2022  6:19 PM Laura Ped Add [R32] Urinary incontinence, unspecified type       ED Disposition     ED Disposition Condition Date/Time Comment    Transfer to Another Facility-In Network  Thu Apr 14, 2022  7:30 PM David Ramirez should be transferred out to [facility] for MRI and return after MRI          MD Documentation      Most Recent Value   Patient Condition The patient has been stabilized such that within reasonable medical probability, no material deterioration of the patient condition or the condition of the unborn child(teagan) is likely to result from the transfer   Reason for Transfer Level of Care needed not available at this facility  [MRI]   Benefits of Transfer Specialized equipment and/or services available at the receiving facility (Include comment)________________________  [MRI]   Risks of Transfer Potential for delay in receiving treatment, Potential deterioration of medical condition, Loss of IV, Increased discomfort during transfer, Possible worsening of condition or death during transfer   Sending MD Dr Chris Nixon   Provider Certification General risk, such as traffic hazards, adverse weather conditions, rough terrain or turbulence, possible failure of equipment (including vehicle or aircraft), or consequences of actions of persons outside the control of the transport personnel, Unanticipated needs of medical equipment and personnel during transport, Risk of worsening condition, The possibility of a transport vehicle being unavailable      Follow-up Information    None         Patient's Medications   Discharge Prescriptions    No medications on file       No discharge procedures on file      PDMP Review       Value Time User    PDMP Reviewed  Yes 4/14/2022  4:15 PM Joana Head MD          ED Provider  Electronically Signed by           Joana Head MD  04/14/22 7021

## 2022-04-14 NOTE — PROGRESS NOTES
Assessment/Plan   Diagnoses and all orders for this visit:    Acute pain of left shoulder    Shoulder weakness  - MRI shoulder left   - Start PT (this is scheduled for the other shoulder on Tuesday)  - Ice as needed  - Follow up with Dr Ivanna Rivas in 2 weeks  - We will schedule all this by phone tomorrow so he can to straight to the ER now        Urinary incontinence  - Concern for cauda equina syndrome  - Sees spine & pain for lumbar issues  - He is going directly to the ER now          Subjective   Patient ID: [id number] Mcmahon is a 27 y o  male  Vitals:    04/14/22 1531   Pulse: 94   SpO2: 99%     32yo male comes in for an evaluation of his LEFT shoulder  He was originally injured at work on 3-10-22 when boxes fell off of a pallet and onto him  He went to the ER for left shoulder pain and an xray was normal   On 3-14, he followed up with me, but at that time the left shoulder pain had resolved and his right shoulder was bothering him  I evaluated and treated the right shoulder  He saw Dr Dm Fiore in [facility] who also addressed the right shoulder  At some point the left shoulder returned  It increases with reaching  The pain is moderate in severity  It does not radiate and is not associated with numbness  He requests f/u in this office  Note: he is also seeing a spine specialist for lumbar issues  Over the past week, his left foot has gone numb and he has had a few episodes of incontinence  These episodes are accompanied by a sudden, sharp, sacral pain  He had an episode of incontinence in our waiting room today and was given shorts to change into          The following portions of the patient's history were reviewed and updated as appropriate: allergies, current medications, past family history, past medical history, past social history, past surgical history and problem list     Review of Systems  Ortho Exam  Past Medical History:   Diagnosis Date    Chronic back pain      Past Surgical History: Procedure Laterality Date    BACK SURGERY  2019    lumbar    ORTHOPEDIC SURGERY      SHOULDER SURGERY Right     2019     Family History   Problem Relation Age of Onset    No Known Problems Mother     No Known Problems Father      Social History     Occupational History    Not on file   Tobacco Use    Smoking status: Current Every Day Smoker    Smokeless tobacco: Never Used   Vaping Use    Vaping Use: Never used   Substance and Sexual Activity    Alcohol use: Never    Drug use: Never    Sexual activity: Not on file       Review of Systems   Constitutional: Negative  HENT: Negative  Eyes: Negative  Respiratory: Negative  Cardiovascular: Negative  Gastrointestinal: Negative  Endocrine: Negative  Genitourinary: Negative  Musculoskeletal: As below      Allergic/Immunologic: Negative  Neurological: Negative  Hematological: Negative  Psychiatric/Behavioral: Negative  Objective   Physical Exam      I have personally reviewed pertinent films in PACS and my interpretation is no acute displaced fracture on xray      · Constitutional: Awake, Alert, Oriented  · Eyes: EOMI  · Psych: Mood and affect appropriate  · Heart: regular rate   · Lungs: No audible wheezing  · Abdomen: No guarding  · Lymph: no lymphedema       left Shoulder:  - Appearance   No swelling, discoloration, deformity, or ecchymosis  - Palpation   + tenderness: anterior GH joint area  - ROM   Flexion: 150 (pain past 90), ER: 90 and IR: L1  - Motor   Internal and external rotation 5-/5 with shoulder at side, flexion 5/5  - Special tests   Empty Can Test negative, Drop Arm Test negative and Hawkin's Impingement Test positive  - NVI distally

## 2022-04-15 ENCOUNTER — ANESTHESIA EVENT (INPATIENT)
Dept: RADIOLOGY | Facility: HOSPITAL | Age: 32
DRG: 243 | End: 2022-04-15
Payer: OTHER MISCELLANEOUS

## 2022-04-15 ENCOUNTER — HOSPITAL ENCOUNTER (INPATIENT)
Facility: HOSPITAL | Age: 32
End: 2022-04-15
Attending: INTERNAL MEDICINE | Admitting: INTERNAL MEDICINE

## 2022-04-15 ENCOUNTER — TELEPHONE (OUTPATIENT)
Dept: RADIOLOGY | Facility: HOSPITAL | Age: 32
End: 2022-04-15

## 2022-04-15 ENCOUNTER — HOSPITAL ENCOUNTER (INPATIENT)
Facility: HOSPITAL | Age: 32
LOS: 1 days | Discharge: LEFT BEFORE DISCHARGE | DRG: 243 | End: 2022-04-16
Attending: EMERGENCY MEDICINE | Admitting: HOSPITALIST
Payer: OTHER MISCELLANEOUS

## 2022-04-15 DIAGNOSIS — M54.50 LOW BACK PAIN: ICD-10-CM

## 2022-04-15 DIAGNOSIS — R29.898 WEAKNESS OF LEFT LOWER EXTREMITY: ICD-10-CM

## 2022-04-15 DIAGNOSIS — M54.42 ACUTE MIDLINE LOW BACK PAIN WITH LEFT-SIDED SCIATICA: ICD-10-CM

## 2022-04-15 DIAGNOSIS — G83.4 CAUDA EQUINA SYNDROME (HCC): ICD-10-CM

## 2022-04-15 DIAGNOSIS — R32 URINARY INCONTINENCE: ICD-10-CM

## 2022-04-15 DIAGNOSIS — R32 BLADDER INCONTINENCE: Primary | ICD-10-CM

## 2022-04-15 DIAGNOSIS — S06.0X0S CONCUSSION WITHOUT LOSS OF CONSCIOUSNESS, SEQUELA (HCC): ICD-10-CM

## 2022-04-15 PROBLEM — M54.9 BACK PAIN: Status: ACTIVE | Noted: 2022-04-15

## 2022-04-15 PROBLEM — S06.0X0A CONCUSSION WITHOUT LOSS OF CONSCIOUSNESS: Status: ACTIVE | Noted: 2022-04-15

## 2022-04-15 PROBLEM — R68.89 SUSPECTED CAUDA EQUINA SYNDROME: Status: ACTIVE | Noted: 2022-04-15

## 2022-04-15 LAB
ANION GAP SERPL CALCULATED.3IONS-SCNC: 1 MMOL/L (ref 4–13)
BASOPHILS # BLD AUTO: 0.03 THOUSANDS/ΜL (ref 0–0.1)
BASOPHILS NFR BLD AUTO: 0 % (ref 0–1)
BILIRUB UR QL STRIP: NEGATIVE
BUN SERPL-MCNC: 13 MG/DL (ref 5–25)
CALCIUM SERPL-MCNC: 9.7 MG/DL (ref 8.3–10.1)
CHLORIDE SERPL-SCNC: 108 MMOL/L (ref 100–108)
CLARITY UR: ABNORMAL
CO2 SERPL-SCNC: 27 MMOL/L (ref 21–32)
COLOR UR: YELLOW
CREAT SERPL-MCNC: 1.04 MG/DL (ref 0.6–1.3)
EOSINOPHIL # BLD AUTO: 0.01 THOUSAND/ΜL (ref 0–0.61)
EOSINOPHIL NFR BLD AUTO: 0 % (ref 0–6)
ERYTHROCYTE [DISTWIDTH] IN BLOOD BY AUTOMATED COUNT: 12.6 % (ref 11.6–15.1)
GFR SERPL CREATININE-BSD FRML MDRD: 94 ML/MIN/1.73SQ M
GLUCOSE SERPL-MCNC: 116 MG/DL (ref 65–140)
GLUCOSE UR STRIP-MCNC: NEGATIVE MG/DL
HCT VFR BLD AUTO: 48.1 % (ref 36.5–49.3)
HGB BLD-MCNC: 16.3 G/DL (ref 12–17)
HGB UR QL STRIP.AUTO: NEGATIVE
IMM GRANULOCYTES # BLD AUTO: 0.04 THOUSAND/UL (ref 0–0.2)
IMM GRANULOCYTES NFR BLD AUTO: 0 % (ref 0–2)
KETONES UR STRIP-MCNC: ABNORMAL MG/DL
LEUKOCYTE ESTERASE UR QL STRIP: NEGATIVE
LYMPHOCYTES # BLD AUTO: 1.03 THOUSANDS/ΜL (ref 0.6–4.47)
LYMPHOCYTES NFR BLD AUTO: 9 % (ref 14–44)
MCH RBC QN AUTO: 31.3 PG (ref 26.8–34.3)
MCHC RBC AUTO-ENTMCNC: 33.9 G/DL (ref 31.4–37.4)
MCV RBC AUTO: 92 FL (ref 82–98)
MONOCYTES # BLD AUTO: 0.59 THOUSAND/ΜL (ref 0.17–1.22)
MONOCYTES NFR BLD AUTO: 5 % (ref 4–12)
NEUTROPHILS # BLD AUTO: 10.37 THOUSANDS/ΜL (ref 1.85–7.62)
NEUTS SEG NFR BLD AUTO: 86 % (ref 43–75)
NITRITE UR QL STRIP: NEGATIVE
NRBC BLD AUTO-RTO: 0 /100 WBCS
PH UR STRIP.AUTO: 7.5 [PH]
PLATELET # BLD AUTO: 210 THOUSANDS/UL (ref 149–390)
PMV BLD AUTO: 9.9 FL (ref 8.9–12.7)
POTASSIUM SERPL-SCNC: 4.2 MMOL/L (ref 3.5–5.3)
PROT UR STRIP-MCNC: NEGATIVE MG/DL
RBC # BLD AUTO: 5.21 MILLION/UL (ref 3.88–5.62)
SODIUM SERPL-SCNC: 136 MMOL/L (ref 136–145)
SP GR UR STRIP.AUTO: 1.02 (ref 1–1.03)
UROBILINOGEN UR QL STRIP.AUTO: 0.2 E.U./DL
WBC # BLD AUTO: 12.07 THOUSAND/UL (ref 4.31–10.16)

## 2022-04-15 PROCEDURE — 72158 MRI LUMBAR SPINE W/O & W/DYE: CPT

## 2022-04-15 PROCEDURE — 72146 MRI CHEST SPINE W/O DYE: CPT

## 2022-04-15 PROCEDURE — 36415 COLL VENOUS BLD VENIPUNCTURE: CPT | Performed by: STUDENT IN AN ORGANIZED HEALTH CARE EDUCATION/TRAINING PROGRAM

## 2022-04-15 PROCEDURE — A9585 GADOBUTROL INJECTION: HCPCS | Performed by: PHYSICIAN ASSISTANT

## 2022-04-15 PROCEDURE — 96375 TX/PRO/DX INJ NEW DRUG ADDON: CPT

## 2022-04-15 PROCEDURE — 99285 EMERGENCY DEPT VISIT HI MDM: CPT

## 2022-04-15 PROCEDURE — 96374 THER/PROPH/DIAG INJ IV PUSH: CPT

## 2022-04-15 PROCEDURE — G1004 CDSM NDSC: HCPCS

## 2022-04-15 PROCEDURE — 80048 BASIC METABOLIC PNL TOTAL CA: CPT | Performed by: STUDENT IN AN ORGANIZED HEALTH CARE EDUCATION/TRAINING PROGRAM

## 2022-04-15 PROCEDURE — 85025 COMPLETE CBC W/AUTO DIFF WBC: CPT | Performed by: STUDENT IN AN ORGANIZED HEALTH CARE EDUCATION/TRAINING PROGRAM

## 2022-04-15 PROCEDURE — 81003 URINALYSIS AUTO W/O SCOPE: CPT | Performed by: EMERGENCY MEDICINE

## 2022-04-15 PROCEDURE — 99285 EMERGENCY DEPT VISIT HI MDM: CPT | Performed by: EMERGENCY MEDICINE

## 2022-04-15 PROCEDURE — 72141 MRI NECK SPINE W/O DYE: CPT

## 2022-04-15 RX ORDER — LIDOCAINE HYDROCHLORIDE 10 MG/ML
INJECTION, SOLUTION EPIDURAL; INFILTRATION; INTRACAUDAL; PERINEURAL AS NEEDED
Status: DISCONTINUED | OUTPATIENT
Start: 2022-04-15 | End: 2022-04-15

## 2022-04-15 RX ORDER — MORPHINE SULFATE 4 MG/ML
4 INJECTION, SOLUTION INTRAMUSCULAR; INTRAVENOUS ONCE
Status: DISCONTINUED | OUTPATIENT
Start: 2022-04-15 | End: 2022-04-15

## 2022-04-15 RX ORDER — DIPHENHYDRAMINE HCL 25 MG
25 TABLET ORAL EVERY 6 HOURS PRN
Status: DISCONTINUED | OUTPATIENT
Start: 2022-04-15 | End: 2022-04-16

## 2022-04-15 RX ORDER — METOCLOPRAMIDE HYDROCHLORIDE 5 MG/ML
INJECTION INTRAMUSCULAR; INTRAVENOUS AS NEEDED
Status: DISCONTINUED | OUTPATIENT
Start: 2022-04-15 | End: 2022-04-15

## 2022-04-15 RX ORDER — SODIUM CHLORIDE, SODIUM LACTATE, POTASSIUM CHLORIDE, CALCIUM CHLORIDE 600; 310; 30; 20 MG/100ML; MG/100ML; MG/100ML; MG/100ML
125 INJECTION, SOLUTION INTRAVENOUS CONTINUOUS
Status: DISCONTINUED | OUTPATIENT
Start: 2022-04-15 | End: 2022-04-16

## 2022-04-15 RX ORDER — OXYCODONE HYDROCHLORIDE 5 MG/1
5 TABLET ORAL EVERY 4 HOURS PRN
Status: DISCONTINUED | OUTPATIENT
Start: 2022-04-15 | End: 2022-04-16 | Stop reason: HOSPADM

## 2022-04-15 RX ORDER — OXYCODONE HYDROCHLORIDE 5 MG/1
2.5 TABLET ORAL EVERY 4 HOURS PRN
Status: DISCONTINUED | OUTPATIENT
Start: 2022-04-15 | End: 2022-04-16 | Stop reason: HOSPADM

## 2022-04-15 RX ORDER — DIPHENHYDRAMINE HYDROCHLORIDE 50 MG/ML
12.5 INJECTION INTRAMUSCULAR; INTRAVENOUS ONCE AS NEEDED
Status: DISCONTINUED | OUTPATIENT
Start: 2022-04-15 | End: 2022-04-16

## 2022-04-15 RX ORDER — FENTANYL CITRATE/PF 50 MCG/ML
25 SYRINGE (ML) INJECTION
Status: DISCONTINUED | OUTPATIENT
Start: 2022-04-15 | End: 2022-04-16

## 2022-04-15 RX ORDER — PROPOFOL 10 MG/ML
INJECTION, EMULSION INTRAVENOUS AS NEEDED
Status: DISCONTINUED | OUTPATIENT
Start: 2022-04-15 | End: 2022-04-15

## 2022-04-15 RX ORDER — SODIUM CHLORIDE, SODIUM LACTATE, POTASSIUM CHLORIDE, CALCIUM CHLORIDE 600; 310; 30; 20 MG/100ML; MG/100ML; MG/100ML; MG/100ML
INJECTION, SOLUTION INTRAVENOUS CONTINUOUS PRN
Status: DISCONTINUED | OUTPATIENT
Start: 2022-04-15 | End: 2022-04-15

## 2022-04-15 RX ORDER — DEXAMETHASONE SODIUM PHOSPHATE 4 MG/ML
4 INJECTION, SOLUTION INTRA-ARTICULAR; INTRALESIONAL; INTRAMUSCULAR; INTRAVENOUS; SOFT TISSUE EVERY 6 HOURS SCHEDULED
Status: DISCONTINUED | OUTPATIENT
Start: 2022-04-15 | End: 2022-04-16

## 2022-04-15 RX ORDER — DEXAMETHASONE SODIUM PHOSPHATE 4 MG/ML
8 INJECTION, SOLUTION INTRA-ARTICULAR; INTRALESIONAL; INTRAMUSCULAR; INTRAVENOUS; SOFT TISSUE ONCE AS NEEDED
Status: DISCONTINUED | OUTPATIENT
Start: 2022-04-15 | End: 2022-04-16

## 2022-04-15 RX ORDER — HEPARIN SODIUM 5000 [USP'U]/ML
5000 INJECTION, SOLUTION INTRAVENOUS; SUBCUTANEOUS EVERY 8 HOURS SCHEDULED
Status: DISCONTINUED | OUTPATIENT
Start: 2022-04-15 | End: 2022-04-16 | Stop reason: HOSPADM

## 2022-04-15 RX ORDER — ACETAMINOPHEN 325 MG/1
650 TABLET ORAL EVERY 6 HOURS SCHEDULED
Status: DISCONTINUED | OUTPATIENT
Start: 2022-04-15 | End: 2022-04-16 | Stop reason: HOSPADM

## 2022-04-15 RX ORDER — ONDANSETRON 2 MG/ML
4 INJECTION INTRAMUSCULAR; INTRAVENOUS ONCE
Status: COMPLETED | OUTPATIENT
Start: 2022-04-15 | End: 2022-04-15

## 2022-04-15 RX ORDER — ONDANSETRON 2 MG/ML
INJECTION INTRAMUSCULAR; INTRAVENOUS AS NEEDED
Status: DISCONTINUED | OUTPATIENT
Start: 2022-04-15 | End: 2022-04-15

## 2022-04-15 RX ORDER — DEXAMETHASONE SODIUM PHOSPHATE 10 MG/ML
INJECTION, SOLUTION INTRAMUSCULAR; INTRAVENOUS AS NEEDED
Status: DISCONTINUED | OUTPATIENT
Start: 2022-04-15 | End: 2022-04-15

## 2022-04-15 RX ORDER — DEXAMETHASONE SODIUM PHOSPHATE 10 MG/ML
10 INJECTION, SOLUTION INTRAMUSCULAR; INTRAVENOUS ONCE
Status: COMPLETED | OUTPATIENT
Start: 2022-04-15 | End: 2022-04-15

## 2022-04-15 RX ORDER — DIAZEPAM 5 MG/ML
5 INJECTION, SOLUTION INTRAMUSCULAR; INTRAVENOUS ONCE
Status: COMPLETED | OUTPATIENT
Start: 2022-04-15 | End: 2022-04-15

## 2022-04-15 RX ORDER — ONDANSETRON 2 MG/ML
4 INJECTION INTRAMUSCULAR; INTRAVENOUS ONCE AS NEEDED
Status: DISCONTINUED | OUTPATIENT
Start: 2022-04-15 | End: 2022-04-16 | Stop reason: HOSPADM

## 2022-04-15 RX ORDER — HYDROMORPHONE HCL/PF 1 MG/ML
0.5 SYRINGE (ML) INJECTION ONCE
Status: COMPLETED | OUTPATIENT
Start: 2022-04-15 | End: 2022-04-15

## 2022-04-15 RX ADMIN — DEXAMETHASONE SODIUM PHOSPHATE 4 MG: 4 INJECTION INTRA-ARTICULAR; INTRALESIONAL; INTRAMUSCULAR; INTRAVENOUS; SOFT TISSUE at 23:16

## 2022-04-15 RX ADMIN — DEXAMETHASONE SODIUM PHOSPHATE 10 MG: 10 INJECTION, SOLUTION INTRAMUSCULAR; INTRAVENOUS at 19:17

## 2022-04-15 RX ADMIN — SODIUM CHLORIDE, SODIUM LACTATE, POTASSIUM CHLORIDE, AND CALCIUM CHLORIDE: .6; .31; .03; .02 INJECTION, SOLUTION INTRAVENOUS at 19:03

## 2022-04-15 RX ADMIN — HEPARIN SODIUM 5000 UNITS: 5000 INJECTION INTRAVENOUS; SUBCUTANEOUS at 18:13

## 2022-04-15 RX ADMIN — DEXAMETHASONE SODIUM PHOSPHATE 4 MG: 4 INJECTION INTRA-ARTICULAR; INTRALESIONAL; INTRAMUSCULAR; INTRAVENOUS; SOFT TISSUE at 18:13

## 2022-04-15 RX ADMIN — DIAZEPAM 5 MG: 10 INJECTION, SOLUTION INTRAMUSCULAR; INTRAVENOUS at 13:59

## 2022-04-15 RX ADMIN — DEXAMETHASONE SODIUM PHOSPHATE 10 MG: 10 INJECTION, SOLUTION INTRAMUSCULAR; INTRAVENOUS at 12:09

## 2022-04-15 RX ADMIN — SODIUM CHLORIDE, SODIUM LACTATE, POTASSIUM CHLORIDE, AND CALCIUM CHLORIDE 125 ML/HR: .6; .31; .03; .02 INJECTION, SOLUTION INTRAVENOUS at 22:10

## 2022-04-15 RX ADMIN — HYDROMORPHONE HYDROCHLORIDE 0.5 MG: 1 INJECTION, SOLUTION INTRAMUSCULAR; INTRAVENOUS; SUBCUTANEOUS at 10:38

## 2022-04-15 RX ADMIN — ACETAMINOPHEN 650 MG: 325 TABLET ORAL at 18:13

## 2022-04-15 RX ADMIN — ONDANSETRON 4 MG: 2 INJECTION INTRAMUSCULAR; INTRAVENOUS at 10:32

## 2022-04-15 RX ADMIN — ACETAMINOPHEN 650 MG: 325 TABLET ORAL at 23:16

## 2022-04-15 RX ADMIN — OXYCODONE HYDROCHLORIDE 5 MG: 5 TABLET ORAL at 16:14

## 2022-04-15 RX ADMIN — LIDOCAINE HYDROCHLORIDE 50 MG: 10 INJECTION, SOLUTION EPIDURAL; INFILTRATION; INTRACAUDAL; PERINEURAL at 19:13

## 2022-04-15 RX ADMIN — GADOBUTROL 7 ML: 604.72 INJECTION INTRAVENOUS at 20:53

## 2022-04-15 RX ADMIN — OXYCODONE HYDROCHLORIDE 5 MG: 5 TABLET ORAL at 22:10

## 2022-04-15 RX ADMIN — METOCLOPRAMIDE HYDROCHLORIDE 10 MG: 5 INJECTION INTRAMUSCULAR; INTRAVENOUS at 21:10

## 2022-04-15 RX ADMIN — ACETAMINOPHEN 650 MG: 325 TABLET ORAL at 12:15

## 2022-04-15 RX ADMIN — PROPOFOL 200 MG: 10 INJECTION, EMULSION INTRAVENOUS at 19:13

## 2022-04-15 RX ADMIN — ONDANSETRON 4 MG: 2 INJECTION INTRAMUSCULAR; INTRAVENOUS at 21:10

## 2022-04-15 NOTE — ED NOTES
Provider with patient discussing AMA form, this nurse witnessed patient understanding and signature       Jorge Saldaña RN  04/15/22 5120

## 2022-04-15 NOTE — ED CARE HANDOFF
Emergency Department Sign Out Note        Sign out and transfer of care from   See Separate Emergency Department note  The patient, Prem Benito, was evaluated by the previous provider for   Workup Completed:      ED Course / Workup Pending (followup): To er with back pain and incontinence and leg weakness  He had a MRI today which was noted  This should have been done with contrast as per neurosurgery but was unable to be done as pt did not tolerate this well  In er he is now with continued back pain, incontinent and lower ext weakness  I have discussed case with Terry for his sx and neuro surgery eval  He will need to spend night in er here and tania be sent there tomorrow AM as there are bed issues at accepting facility  transfer initiated, dr Radu Aguilar accepted  Pt declined, wishes to leave  His wife is involved in all decisions making  He is aware he can be paralyzed, these sx may not improve and could worsen  He is aware I STRONGLY advised him to go for further evaluation and tx  Pt remains strong, he wishes to [place] and will fu tomorrow at [place]  Again, advised against this  He is speaking clearly and he is aware of all risk  He has capacity  Wife again unable to make him stay  Spoke to [place] again for possible ER to er transfer in attempt to prevent ama, this is feasible  Pt declined, does not wish to go er to er for further evaluation and wishes to leave  He is aware this could lead to permanent disability, pain, suffering, death                                      Procedures  MDM        Disposition  Final diagnoses:   Lumbar back pain with radiculopathy affecting left lower extremity   Urinary incontinence, unspecified type     Time reflects when diagnosis was documented in both MDM as applicable and the Disposition within this note     Time User Action Codes Description Comment    4/14/2022  6:19 PM Remy Salas Add [U24 16] Lumbar back pain with radiculopathy affecting left lower extremity     4/14/2022  6:19 PM Len Berman Add [R32] Urinary incontinence, unspecified type       ED Disposition     ED Disposition Condition Date/Time Comment    Transfer to Another Facility-In Network  Thu Apr 14, 2022  7:30 PM Adrien Reason should be transferred out to [facility] for MRI and return after MRI  MD Documentation      Most Recent Value   Patient Condition The patient has been stabilized such that within reasonable medical probability, no material deterioration of the patient condition or the condition of the unborn child(teagan) is likely to result from the transfer   Reason for Transfer Level of Care needed not available at this facility  [MRI]   Benefits of Transfer Specialized equipment and/or services available at the receiving facility (Include comment)________________________  [MRI]   Risks of Transfer Potential for delay in receiving treatment, Potential deterioration of medical condition, Loss of IV, Increased discomfort during transfer, Possible worsening of condition or death during transfer   Sending MD Dr Gracie Gao   Provider Certification General risk, such as traffic hazards, adverse weather conditions, rough terrain or turbulence, possible failure of equipment (including vehicle or aircraft), or consequences of actions of persons outside the control of the transport personnel, Unanticipated needs of medical equipment and personnel during transport, Risk of worsening condition, The possibility of a transport vehicle being unavailable      Follow-up Information    None       Patient's Medications   Discharge Prescriptions    No medications on file     No discharge procedures on file         ED Provider  Electronically Signed by     Hema Llanos MD  04/16/22 5025

## 2022-04-15 NOTE — CASE MANAGEMENT
Case Management ED Discharge Planning Note    Patient name Cyndee Day  Location ED 14/ED 14 MRN [id number]  : 1990 Date 4/15/2022        OBJECTIVE:  Predictive Model Details         7% Factor Value    Risk of Hospital Admission or ED Visit Model Is in Relationship Yes     Number of ED Visits 4       Chief Complaint: Back pain   Patient Class: Inpatient  Preferred Pharmacy:   [id number] [address]  Phone: [phone] Fax: [phone]    Primary Care Provider: No primary care provider on file  Primary Insurance:   Secondary Insurance:     ED Discharge Details: Other Referral/Resources/Interventions Provided:  Interventions: PCP,InfoLink  Referral Comments: Cm met with pt at bedside due to no listed PCP in chart  Pt accepted PCP information including  [facility]Link card,  family practice list, and [facility] [employer] [facility] list    CM reviewed information with pt  Pt declined to have CM assist in scheduling

## 2022-04-15 NOTE — ASSESSMENT & PLAN NOTE
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   Recommend nonemergent dedicated CT imaging of the abdomen   MRI lumbar spine wo contrast 4/7/2022:  Please note that this study was not performed with contrast   The patient requested early termination of the examination due to right shoulder pain  Postoperative changes of right L4 laminectomy and probable L4-5 discectomy without evidence of compressive degenerative discogenic disease  Mild noncompressive degenerative discogenic disease at L5-S1    MRI lumbar spine wo contrast 4/14/2022:  Stable exam demonstrating disc degenerative change at L4-5 and L5-S1  Plan:   Continue to monitor neuro exam   GCS 15, weakness noted throughout, most notable in left more than right foot, diffuse nondermatomal dullness to PP, +right matos, +rectal tone   Unclear etiology of patient's symptoms  He has had MRI thoracic spine as well as 2 MRI lumbar spine, all of which without any compressive etiology to account for urinary incontinence, weakness, etc   PVR completed in the ED with 35cc in bladder  Does have matos and slightly brisk reflexes, given trauma to the head may be cervical etiology  Query if urinary incontinence is pain related    Ongoing work up warranted:  o MRI cervical spine wo contrast ordered STAT, discussed with Dr Kayode Talley in radiology  o Will also get updated MRI thoracic wo contrast and MRI lumbar w wo contrast (due to prior surgery) although unlikely to look different as patient reports no additional traumas, these will be done when able   If work up remains unremarkable for spinal etiology, consider Neurology consult   Medical management and pain control per primary team  o Given decadron 10mg IV bolus, now on 4mg q6 hours   DVT ppx:  SCDs, heparin sq   Mobilize as tolerated with assistance, PT / OT eval on hold until completion of imaging    Neurosurgery will review imaging once completed  Please call with questions or concerns

## 2022-04-15 NOTE — ASSESSMENT & PLAN NOTE
As part of suspected cauda equina syndrome  No concern for infectious etiology  Patient cannot feel sensation of full bladder, initiate urination, or feel urination  UA negative for signs of infection    Plan:  No imaging findings to explain incontinence  Neurology consulted for assistance  Urinary retention protocol in place     Maintain condom cath

## 2022-04-15 NOTE — CONSULTS
[facility] 1990, 27 y o  male MRN: [id number]  Unit/Bed#: ED 14 Encounter: [id number]  Primary Care Provider: No primary care provider on file  Date and time admitted to hospital: 4/15/2022  9:47 AM    Addendum:  Patient unable to fully complete MRI cervical spine today secondary to claustrophobia  Will attempt later this evening under anesthesia  Has not had anything to eat since 1:00 a m , currently NPO  Was able to review MRI follow-up results of the cervical spine, although this was an incomplete examination there is no significant canal stenosis or high-grade neural foraminal narrowing appreciated, normal signal of the cervical spine cord on sagittal T2 sequence  At this point given grossly unremarkable MRI of the cervical spine as well as previous MRI thoracic and lumbar spine without compressive etiology, unlikely that patient's symptoms are of central origin  Will review imaging as it becomes available to ensure no compressive etiology is noted however it is unlikely that any surgical intervention would be warranted at this time  May need to consider Neurology consultation  Inpatient consult to Neurosurgery  Consult performed by: Rory Dance, PA-C  Consult ordered by: Daniela Walter DO      consult completed on 4/15/2022 at 1120    Back pain  Assessment & Plan  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   Recommend nonemergent dedicated CT imaging of the abdomen     MRI lumbar spine wo contrast 4/7/2022:  Please note that this study was not performed with contrast   The patient requested early termination of the examination due to right shoulder pain  Postoperative changes of right L4 laminectomy and probable L4-5 discectomy without evidence of compressive degenerative discogenic disease  Mild noncompressive degenerative discogenic disease at L5-S1    MRI lumbar spine wo contrast 4/14/2022:  Stable exam demonstrating disc degenerative change at L4-5 and L5-S1  Plan:   Continue to monitor neuro exam   GCS 15, weakness noted throughout, most notable in left more than right foot, diffuse nondermatomal dullness to PP, +right matos, +rectal tone   Unclear etiology of patient's symptoms  He has had MRI thoracic spine as well as 2 MRI lumbar spine, all of which without any compressive etiology to account for urinary incontinence, weakness, etc   PVR completed in the ED with 35cc in bladder  Does have matos and slightly brisk reflexes, given trauma to the head may be cervical etiology  Query if urinary incontinence is pain related  Ongoing work up warranted:  o MRI cervical spine wo contrast ordered STAT, discussed with Dr Kiel Mojica in radiology  o Will also get updated MRI thoracic wo contrast and MRI lumbar w wo contrast (due to prior surgery) although unlikely to look different as patient reports no additional traumas, these will be done when able   If work up remains unremarkable for spinal etiology, consider Neurology consult   Medical management and pain control per primary team  o Given decadron 10mg IV bolus, now on 4mg q6 hours   DVT ppx:  SCDs, heparin sq   Mobilize as tolerated with assistance, PT / OT eval on hold until completion of imaging    Neurosurgery will review imaging once completed  Please call with questions or concerns      Urinary incontinence  Assessment & Plan  See plan above    History of Present Illness   HPI: Haroon Leyva is a 27y o  year old male with PMH including prior L4-5 discectomy in 2019 who presents from the outpatient ortho office with worsening back pain, LLE pain / numbness, gait instability and urinary incontinence concerning for cauda equina  Patient states he was at work about 1 month ago when 6 boxes fell off a pallet and onto his head  He started experiencing lower back / buttock pain at that time, stating the pain is centralized around his anus  He also has pain in his left foot with numbness along the top aspect  He has been having new pain in his groin bilaterally  He also has difficulty with walking  He was seen by pain management who ordered MRI lumbar and thoracic spine, no compressive findings at that time  He was being seen by ortho yesterday for his shoulder pain and stated he had severe pain in his anus and shortly after notice he was all wet and had urinated on himself without realizing, they had to give him shorts there in the office to change into  He reports additional episodes of incontinence, last this morning  Denies being numb in his groin / penis / scrotal area  Has not had BM or passed gas to know if he his numb in his groin  Does not have shooting pain throughout his whole leg, just in the buttock / anus region and in the foot  RLE is fine  Denies all other complaints  Of note, patient has lumbar surgery in 2019, only remembers having back pain at that time without radicular symptoms  States surgery was a success and he has no issues with pain until this most recent accident  Review of Systems   Constitutional: Negative for chills and fever  HENT: Negative for hearing loss and trouble swallowing  Eyes: Negative for visual disturbance  Respiratory: Negative for chest tightness and shortness of breath  Cardiovascular: Negative for chest pain  Gastrointestinal: Positive for nausea  Negative for abdominal pain, constipation, diarrhea and vomiting  Genitourinary: Positive for difficulty urinating     Musculoskeletal: Positive for back pain and gait problem  Negative for neck pain  Skin: Negative for wound  Allergic/Immunologic: Negative for environmental allergies and food allergies  Neurological: Positive for weakness and numbness  Negative for dizziness, facial asymmetry, speech difficulty and headaches  Hematological: Does not bruise/bleed easily  Psychiatric/Behavioral: Negative for confusion  Historical Information   Past Medical History:   Diagnosis Date    Chronic back pain      Past Surgical History:   Procedure Laterality Date    BACK SURGERY  2019    lumbar    ORTHOPEDIC SURGERY      SHOULDER SURGERY Right     2019     Social History     Substance and Sexual Activity   Alcohol Use Never     Social History     Substance and Sexual Activity   Drug Use Never     Social History     Tobacco Use   Smoking Status Current Every Day Smoker   Smokeless Tobacco Never Used     Family History   Problem Relation Age of Onset    No Known Problems Mother     No Known Problems Father        Meds/Allergies   all current active meds have been reviewed, current meds:   Current Facility-Administered Medications   Medication Dose Route Frequency    acetaminophen (TYLENOL) tablet 650 mg  650 mg Oral Q6H [address]    dexamethasone (DECADRON) injection 4 mg  4 mg Intravenous Q6H [address]    diazepam (VALIUM) injection 5 mg  5 mg Intravenous Once    diphenhydrAMINE (BENADRYL) tablet 25 mg  25 mg Oral Q6H PRN    heparin (porcine) subcutaneous injection 5,000 Units  5,000 Units Subcutaneous Q8H [address]    oxyCODONE (ROXICODONE) IR tablet 2 5 mg  2 5 mg Oral Q4H PRN    Or    oxyCODONE (ROXICODONE) IR tablet 5 mg  5 mg Oral Q4H PRN    and PTA meds:   Prior to Admission Medications   Prescriptions Last Dose Informant Patient Reported? Taking?    Ibuprofen (MOTRIN PO)   Yes No   Sig: Take by mouth   aspirin-acetaminophen-caffeine (EXCEDRIN MIGRAINE) 250-250-65 MG per tablet   Yes No   Sig: Take 1 tablet by mouth every 6 (six) hours as needed for headaches naproxen (NAPROSYN) 375 mg tablet  Self No No   Sig: Take 1 tablet (375 mg total) by mouth 2 (two) times a day with meals for 20 doses      Facility-Administered Medications: None     Allergies   Allergen Reactions    Morphine Hives       Objective   I/O     None          Physical Exam  Constitutional:       General: He is awake  Appearance: Normal appearance  HENT:      Head: Normocephalic and atraumatic  Eyes:      Extraocular Movements: Extraocular movements intact and EOM normal       Conjunctiva/sclera: Conjunctivae normal    Cardiovascular:      Rate and Rhythm: Normal rate  Pulmonary:      Effort: Pulmonary effort is normal  No respiratory distress  Musculoskeletal:      Cervical back: Tenderness present  Spinous process tenderness present  Thoracic back: Tenderness present  Lumbar back: Tenderness present  Skin:     General: Skin is warm and dry  Neurological:      Mental Status: He is alert and oriented to person, place, and time  Coordination: Finger-Nose-Finger Test normal    Psychiatric:         Attention and Perception: Attention and perception normal          Mood and Affect: Mood and affect normal          Speech: Speech normal          Behavior: Behavior normal  Behavior is cooperative  Thought Content: Thought content normal          Cognition and Memory: Cognition and memory normal          Judgment: Judgment normal        Neurologic Exam     Mental Status   Oriented to person, place, and time  Follows 1 step commands  Attention: normal  Concentration: normal    Speech: speech is normal   Level of consciousness: alert  Knowledge: good  Normal comprehension       Cranial Nerves     CN III, IV, VI   Extraocular motions are normal    CN III: no CN III palsy  CN VI: no CN VI palsy  Nystagmus: none   Diplopia: none  Ophthalmoparesis: none  Upgaze: normal  Downgaze: normal  Conjugate gaze: present    CN V   Right facial sensation deficit: none  Left facial sensation deficit: none    CN VII   Right facial weakness: none  Left facial weakness: none    CN VIII   Hearing: intact    CN IX, X   CN IX normal    CN X normal      CN XI   Right trapezius strength: normal  Left trapezius strength: normal    CN XII   CN XII normal      Motor Exam   Muscle bulk: normal  Overall muscle tone: normal  Right arm pronator drift: absent  Left arm pronator drift: absent  BUE:  Mild weakness due to shoulder and back pain, 4+/5 throughout  RLE:  -4/5 DF, 4+/5 PF, 4-/5 EHL  LLE:  4/5 HF, at least 3/5 KE, 3-4-/5 DF, 4/5 PF, 3/5 EHL     Sensory Exam   DST intact  JPS bilateral hallux 3/3 bilaterally  Numbness to LT in dorsal aspect of left foot  Patchy diffuse dullness to PP throughout, no dermatomal pattern, most notable in bilateral hands, left dorum of foot, across chest, buttock / perianal region  Patient able to feel rectal tone assessment, +rectal tone     Gait, Coordination, and Reflexes     Coordination   Finger to nose coordination: normal    Tremor   Resting tremor: absent  Intention tremor: absent  Action tremor: absent    Reflexes   Right : 2+  Left : 2+  Right Freitas: absent  Left Freitas: present  Right ankle clonus: absent  Left ankle clonus: absent  2-3+ patellar reflexes  2+ bicep / brachioradialis bilaterally       Vitals:Blood pressure 139/88, pulse 84, temperature 98 6 °F (37 °C), temperature source Tympanic, resp  rate 18, SpO2 99 %  ,There is no height or weight on file to calculate BMI       Lab Results:   Results from last 7 days   Lab Units 04/15/22  1030 04/14/22  1726   WBC Thousand/uL 12 07* 11 10*   HEMOGLOBIN g/dL 16 3 16 4   HEMATOCRIT % 48 1 47 8   PLATELETS Thousands/uL 210 188   NEUTROS PCT % 86* 79*   MONOS PCT % 5 5     Results from last 7 days   Lab Units 04/15/22  1030 04/14/22  1726   POTASSIUM mmol/L 4 2 3 8   CHLORIDE mmol/L 108 103   CO2 mmol/L 27 26   BUN mg/dL 13 12   CREATININE mg/dL 1 04 1 08   CALCIUM mg/dL 9 7 9 1                 No results found for: TROPONINT  ABG:No results found for: PHART, KLY3WSN, PO2ART, KJC4CYJ, C3QMCFNH, BEART, SOURCE    Imaging Studies: I have personally reviewed pertinent reports  and I have personally reviewed pertinent films in PACS    EKG, Pathology, and Other Studies: I have personally reviewed pertinent reports  VTE Prophylaxis: Sequential compression device Mendel Bloch)     Code Status: Level 1 - Full Code  Advance Directive and Living Will:      Power of :    POLST:      Counseling / Coordination of Care  I spent 20 minutes with the patient

## 2022-04-15 NOTE — ASSESSMENT & PLAN NOTE
Midline tenderness present in lumbar spine, no step-off    Likely sciatica/acute spasm at this point  Acute on chronic 2/2 fall from work injury  Continue steroids and pain regimen

## 2022-04-15 NOTE — ED NOTES
Patient wheeled in wheelchair off unit with spouse at this time  Patient again verbalized understanding of leaving CARLEE Saldaña RN  04/15/22 8022

## 2022-04-15 NOTE — ASSESSMENT & PLAN NOTE
Patient presenting with acute onset bladder incontinence, obstipation, constipation, decreased inner thigh and genital sensation, and inability to achieve erection x2 days in setting of worsening lumbar back pain  Plan:  -suspect most likely sciatica given MRI of entire spine was without evidence of nerve compression  -treat for acute back pain with methylprednisolone taper, gabapentin, robaxin, toradol, tylenol, lidoderm patch   -q shift neuro checks  -neurology consulted to weigh in on urinary incontinence  Question need for EMG? Patient has outpatient appointment on 4/22 to see them regarding his headaches  -d/c IV pain control   PRN oxy in place  -neurosurgery signed off  -maintain condom cath and monitor for signs of acute retention  -PT/OT ok to work with patient now given negative MRI

## 2022-04-15 NOTE — QUICK NOTE
Wife is here to see the patient  She currently has his book bag with the belongings I have documented - see documentation for specific items  snacks, jewelry, phone , cigs, lighter,  wallet     The wife MIGHT be taking some of his belongings home with her tonight  I asked her to please show the night shift nurse the items she is going to take if she does take any

## 2022-04-15 NOTE — PLAN OF CARE
Problem: MOBILITY - ADULT  Goal: Maintain or return to baseline ADL function  Description: INTERVENTIONS:  -  Assess patient's ability to carry out ADLs; assess patient's baseline for ADL function and identify physical deficits which impact ability to perform ADLs (bathing, care of mouth/teeth, toileting, grooming, dressing, etc )  - Assess/evaluate cause of self-care deficits   - Assess range of motion  - Assess patient's mobility; develop plan if impaired  - Assess patient's need for assistive devices and provide as appropriate  - Encourage maximum independence but intervene and supervise when necessary  - Involve family in performance of ADLs  - Assess for home care needs following discharge   - Consider OT consult to assist with ADL evaluation and planning for discharge  - Provide patient education as appropriate  Outcome: Progressing  Goal: Maintains/Returns to pre admission functional level  Description: INTERVENTIONS:  - Perform BMAT or MOVE assessment daily    - Set and communicate daily mobility goal to care team and patient/family/caregiver  - Collaborate with rehabilitation services on mobility goals if consulted  - Perform Range of Motion 3imes a day  - Reposition patient every 2hours    - Dangle patient 3times a day  - Stand patient 3times a day  - Ambulate patient 3 times a day  - Out of bed to [id number] a day   - Out of bed for meals 3 times a day  - Out of bed for toileting  - Record patient progress and toleration of activity level   Outcome: Progressing     Problem: Potential for Falls  Goal: Patient will remain free of falls  Description: INTERVENTIONS:  - Educate patient/family on patient safety including physical limitations  - Instruct patient to call for assistance with activity   - Consult OT/PT to assist with strengthening/mobility   - Keep Call bell within reach  - Keep bed low and locked with side rails adjusted as appropriate  - Keep care items and personal belongings within reach  - Initiate and maintain comfort rounds  - Make Fall Risk Sign visible to staff  - Offer Toileting every 2Hours, in advance of need  - Initiate/Maintainbed  Chair alarm  - Obtain necessary fall risk management equipment: alarms  - Apply yellow socks and bracelet for high fall risk patients  - Consider moving patient to room near nurses station  Outcome: Progressing     Problem: Nutrition/Hydration-ADULT  Goal: Nutrient/Hydration intake appropriate for improving, restoring or maintaining nutritional needs  Description: Monitor and assess patient's nutrition/hydration status for malnutrition  Collaborate with interdisciplinary team and initiate plan and interventions as ordered  Monitor patient's weight and dietary intake as ordered or per policy  Utilize nutrition screening tool and intervene as necessary  Determine patient's food preferences and provide high-protein, high-caloric foods as appropriate       INTERVENTIONS:  - Monitor oral intake, urinary output, labs, and treatment plans  - Assess nutrition and hydration status and recommend course of action  - Evaluate amount of meals eaten  - Assist patient with eating if necessary   - Allow adequate time for meals  - Recommend/ encourage appropriate diets, oral nutritional supplements, and vitamin/mineral supplements  - Order, calculate, and assess calorie counts as needed  - Recommend, monitor, and adjust tube feedings and TPN/PPN based on assessed needs  - Assess need for intravenous fluids  - Provide specific nutrition/hydration education as appropriate  - Include patient/family/caregiver in decisions related to nutrition  Outcome: Progressing     Problem: PAIN - ADULT  Goal: Verbalizes/displays adequate comfort level or baseline comfort level  Description: Interventions:  - Encourage patient to monitor pain and request assistance  - Assess pain using appropriate pain scale  - Administer analgesics based on type and severity of pain and evaluate response  - Implement non-pharmacological measures as appropriate and evaluate response  - Consider cultural and social influences on pain and pain management  - Notify physician/advanced practitioner if interventions unsuccessful or patient reports new pain  Outcome: Progressing     Problem: INFECTION - ADULT  Goal: Absence or prevention of progression during hospitalization  Description: INTERVENTIONS:  - Assess and monitor for signs and symptoms of infection  - Monitor lab/diagnostic results  - Monitor all insertion sites, i e  indwelling lines, tubes, and drains  - Monitor endotracheal if appropriate and nasal secretions for changes in amount and color  - [place] appropriate cooling/warming therapies per order  - Administer medications as ordered  - Instruct and encourage patient and family to use good hand hygiene technique  - Identify and instruct in appropriate isolation precautions for identified infection/condition  Outcome: Progressing  Goal: Absence of fever/infection during neutropenic period  Description: INTERVENTIONS:  - Monitor WBC    Outcome: Progressing     Problem: SAFETY ADULT  Goal: Maintain or return to baseline ADL function  Description: INTERVENTIONS:  -  Assess patient's ability to carry out ADLs; assess patient's baseline for ADL function and identify physical deficits which impact ability to perform ADLs (bathing, care of mouth/teeth, toileting, grooming, dressing, etc )  - Assess/evaluate cause of self-care deficits   - Assess range of motion  - Assess patient's mobility; develop plan if impaired  - Assess patient's need for assistive devices and provide as appropriate  - Encourage maximum independence but intervene and supervise when necessary  - Involve family in performance of ADLs  - Assess for home care needs following discharge   - Consider OT consult to assist with ADL evaluation and planning for discharge  - Provide patient education as appropriate  Outcome: Progressing    Goal: Patient will remain free of falls  Description: INTERVENTIONS:  - Educate patient/family on patient safety including physical limitations  - Instruct patient to call for assistance with activity   - Consult OT/PT to assist with strengthening/mobility   - Keep Call bell within reach  - Keep bed low and locked with side rails adjusted as appropriate  - Keep care items and personal belongings within reach  - Initiate and maintain comfort rounds  - Make Fall Risk Sign visible to staff  - Offer Toileting [id number], in advance of need  - Initiate/Maintain bed chair larm       Problem: DISCHARGE PLANNING  Goal: Discharge to home or other facility with appropriate resources  Description: INTERVENTIONS:  - Identify barriers to discharge w/patient and caregiver  - Arrange for needed discharge resources and transportation as appropriate  - Identify discharge learning needs (meds, wound care, etc )  - Arrange for interpretive services to assist at discharge as needed  - Refer to Case Management Department for coordinating discharge planning if the patient needs post-hospital services based on physician/advanced practitioner order or complex needs related to functional status, cognitive ability, or social support system  Outcome: Progressing     Problem: Knowledge Deficit  Goal: Patient/family/caregiver demonstrates understanding of disease process, treatment plan, medications, and discharge instructions  Description: Complete learning assessment and assess knowledge base    Interventions:  - Provide teaching at level of understanding  - Provide teaching via preferred learning methods  Outcome: Progressing

## 2022-04-15 NOTE — ED PROVIDER NOTES
History  Chief Complaint   Patient presents with    Back Pain     Pt was injured at work a few weeks ago, has been following up with ortho, has been incontinent of urine,  yesterday had MRI, but was unable to tolerate it with contrast     Patient is a 70-year-old male, past medical history of chronic back pain, who presents to the emergency department for worsening back pain, as well as urine incontinence and groin numbness  Patient states that he was involved in a workplace injury several weeks ago in which pallets fell onto his back  Imaging at the time of the injury did not reveal any acute abnormalities  Patient states he has been following up with pain management for this injury  Yesterday, while at appointment, he had an episode of urinary incontinence  They subsequently recommended he present to the emergency department for evaluation  Patient states he was seen in the emergency department yesterday  An MRI was ordered, but he needed to be transferred to [facility] to have the scans  Plans were made to transfer patient to [facility] for further management, however patient left AMA  Patient states since then, he has continued to have the symptoms  The pain mainly involves his sacrum/coccyx  It is constant, and worse with movement  He is also complaining of decreased sensation to his groin ("it feels weird")  Denies any bowel incontinence  Yesterday's evaulation was singificant for diminished sensation of the medial foot and 1-2 toe interspace on the left, no saddle anesthesia, and LLE weakness  Per ED note, after discussion with neurosurgery, MRI was ordered of the L spine with and without contrast  Due to emergent nature of scan, patient had be transferred to [facility] for the scans  While waiting, patient left AMA because "they left me covered in urine on the bed"    Prior imaging revealed  MRI on 4/7/2022 of the thoracic and lumbar spine were stable   Repeat MRI of the lumbar spine on 4/14 was also stable  History provided by:  Patient and medical records   used: No        Prior to Admission Medications   Prescriptions Last Dose Informant Patient Reported? Taking? Ibuprofen (MOTRIN PO)   Yes No   Sig: Take by mouth   aspirin-acetaminophen-caffeine (EXCEDRIN MIGRAINE) 250-250-65 MG per tablet   Yes No   Sig: Take 1 tablet by mouth every 6 (six) hours as needed for headaches   naproxen (NAPROSYN) 375 mg tablet  Self No No   Sig: Take 1 tablet (375 mg total) by mouth 2 (two) times a day with meals for 20 doses      Facility-Administered Medications: None       Past Medical History:   Diagnosis Date    Chronic back pain        Past Surgical History:   Procedure Laterality Date    BACK SURGERY  2019    lumbar    ORTHOPEDIC SURGERY      SHOULDER SURGERY Right     2019       Family History   Problem Relation Age of Onset    No Known Problems Mother     No Known Problems Father      I have reviewed and agree with the history as documented  E-Cigarette/Vaping    E-Cigarette Use Never User      E-Cigarette/Vaping Substances    Nicotine No     THC No     CBD No     Flavoring No     Other No     Unknown No      Social History     Tobacco Use    Smoking status: Current Every Day Smoker    Smokeless tobacco: Never Used   Vaping Use    Vaping Use: Never used   Substance Use Topics    Alcohol use: Never    Drug use: Never        Review of Systems   Constitutional: Negative for chills and fever  Respiratory: Negative for shortness of breath  Cardiovascular: Negative for chest pain  Gastrointestinal: Positive for nausea  Negative for abdominal pain, diarrhea and vomiting  Genitourinary: Negative for difficulty urinating, dysuria and hematuria  Urinary incontinence    Musculoskeletal: Positive for back pain  Skin: Negative for wound  Neurological: Positive for weakness and numbness  All other systems reviewed and are negative        Physical Exam  ED Triage Vitals [04/15/22 0953]   Temperature Pulse Respirations Blood Pressure SpO2   98 6 °F (37 °C) 84 18 139/88 99 %      Temp Source Heart Rate Source Patient Position - Orthostatic VS BP Location FiO2 (%)   Tympanic -- -- Left arm --      Pain Score       10 - Worst Possible Pain             Orthostatic Vital Signs  Vitals:    04/15/22 0953   BP: 139/88   Pulse: 84       Physical Exam  Vitals and nursing note reviewed  Constitutional:       General: He is not in acute distress  Appearance: He is well-developed  He is not diaphoretic  HENT:      Head: Normocephalic and atraumatic  Right Ear: External ear normal       Left Ear: External ear normal       Nose: Nose normal    Eyes:      General: Lids are normal  No scleral icterus  Cardiovascular:      Rate and Rhythm: Normal rate and regular rhythm  Heart sounds: Normal heart sounds  No murmur heard  No friction rub  No gallop  Pulmonary:      Effort: Pulmonary effort is normal  No respiratory distress  Breath sounds: Normal breath sounds  No wheezing or rales  Abdominal:      Palpations: Abdomen is soft  Tenderness: There is no abdominal tenderness  There is no guarding or rebound  Musculoskeletal:         General: No deformity  Normal range of motion  Skin:     General: Skin is warm and dry  Neurological:      Mental Status: He is alert  GCS: GCS eye subscore is 4  GCS verbal subscore is 5  GCS motor subscore is 6  Sensory: Sensory deficit present  Motor: Weakness present  Deep Tendon Reflexes:      Reflex Scores:       Patellar reflexes are 2+ on the left side  Comments: There is decreased sensation to the medial left thigh and ventral left foot  3+ strength LLE  Rectal tone present, but decreased  2+ patella reflex      Psychiatric:         Mood and Affect: Mood normal          Behavior: Behavior normal          ED Medications  Medications   heparin (porcine) subcutaneous injection 5,000 Units (has no administration in time range)   oxyCODONE (ROXICODONE) IR tablet 2 5 mg (has no administration in time range)     Or   oxyCODONE (ROXICODONE) IR tablet 5 mg (has no administration in time range)   diphenhydrAMINE (BENADRYL) tablet 25 mg (has no administration in time range)   acetaminophen (TYLENOL) tablet 650 mg (650 mg Oral Given 4/15/22 1215)   dexamethasone (DECADRON) injection 4 mg (has no administration in time range)   ondansetron (ZOFRAN) injection 4 mg (4 mg Intravenous Given 4/15/22 1032)   HYDROmorphone (DILAUDID) injection 0 5 mg (0 5 mg Intravenous Given 4/15/22 1038)   diazepam (VALIUM) injection 5 mg (5 mg Intravenous Given 4/15/22 1359)   dexamethasone (PF) (DECADRON) injection 10 mg (10 mg Intravenous Given 4/15/22 1209)       Diagnostic Studies  Results Reviewed     Procedure Component Value Units Date/Time    Basic metabolic panel [[id number]]  (Abnormal) Collected: 04/15/22 1030    Lab Status: Final result Specimen: Blood from Arm, Right Updated: 04/15/22 1109     Sodium 136 mmol/L      Potassium 4 2 mmol/L      Chloride 108 mmol/L      CO2 27 mmol/L      ANION GAP 1 mmol/L      BUN 13 mg/dL      Creatinine 1 04 mg/dL      Glucose 116 mg/dL      Calcium 9 7 mg/dL      eGFR 94 ml/min/1 73sq m     Narrative:      [place] guidelines for Chronic Kidney Disease (CKD):     Stage 1 with normal or high GFR (GFR > 90 mL/min/1 73 square meters)    Stage 2 Mild CKD (GFR = 60-89 mL/min/1 73 square meters)    Stage 3A Moderate CKD (GFR = 45-59 mL/min/1 73 square meters)    Stage 3B Moderate CKD (GFR = 30-44 mL/min/1 73 square meters)    Stage 4 Severe CKD (GFR = 15-29 mL/min/1 73 square meters)    Stage 5 End Stage CKD (GFR <15 mL/min/1 73 square meters)  Note: GFR calculation is accurate only with a steady state creatinine    CBC and differential [[id number]]  (Abnormal) Collected: 04/15/22 1030    Lab Status: Final result Specimen: Blood from Arm, Right Updated: 04/15/22 1059     WBC 12 07 Thousand/uL      RBC 5 21 Million/uL      Hemoglobin 16 3 g/dL      Hematocrit 48 1 %      MCV 92 fL      MCH 31 3 pg      MCHC 33 9 g/dL      RDW 12 6 %      MPV 9 9 fL      Platelets 762 Thousands/uL      nRBC 0 /100 WBCs      Neutrophils Relative 86 %      Immat GRANS % 0 %      Lymphocytes Relative 9 %      Monocytes Relative 5 %      Eosinophils Relative 0 %      Basophils Relative 0 %      Neutrophils Absolute 10 37 Thousands/µL      Immature Grans Absolute 0 04 Thousand/uL      Lymphocytes Absolute 1 03 Thousands/µL      Monocytes Absolute 0 59 Thousand/µL      Eosinophils Absolute 0 01 Thousand/µL      Basophils Absolute 0 03 Thousands/µL                  MRI thoracic spine wo contrast    (Results Pending)   MRI lumbar spine w wo contrast    (Results Pending)   MRI follow up neuro    (Results Pending)   MRI cervical spine wo contrast    (Results Pending)         Procedures  Procedures      ED Course  ED Course as of 04/15/22 1524   Fri Apr 15, 2022   [address] [place] [address] [id number] text sent to [facility] for admission   [address] text sent to neurosurgery   [phone] with radiology, Dr Margie Contreras  OK to scan L spine w/ contrast  Order placed   [phone] with Dr Margie Contreras again  MRI un-approved due to recent negative scan  Will discuss with neurosurgery   1043 Discussed case with neurosurgery  They will await MRI scans  MDM  Number of Diagnoses or Management Options  Low back pain  Urinary incontinence  Weakness of left lower extremity  Diagnosis management comments: Patient is a 27 y o  male who presents to the ED for back pain, LLE weakness, LLE numbness, and urinary incontinence  Differential diagnoses includes musculoskeletal spasm / strain, spinal cord injury/cauda equina  Although MRI's have been negative so far, patient has not had contrast at this point   Given the clinical picture, plan to obtain MRI w/ contrast  Will also reach out to neurosurgery for further reccomendations  Plan: Labs, pain control, supportive care, reassess                 Portions of the record may have been created with voice recognition software  Occasional wrong word or "sound a like" substitutions may have occurred due to the inherent limitations of voice recognition software  Read the chart carefully and recognize, using context, where substitutions have occurred  Amount and/or Complexity of Data Reviewed  Clinical lab tests: ordered  Tests in the radiology section of CPT®: reviewed    Risk of Complications, Morbidity, and/or Mortality  Presenting problems: high  Diagnostic procedures: high  Management options: high    Patient Progress  Patient progress: stable      Disposition  Final diagnoses:   Weakness of left lower extremity   Urinary incontinence   Low back pain     Time reflects when diagnosis was documented in both MDM as applicable and the Disposition within this note     Time User Action Codes Description Comment    4/15/2022 10:22 AM Lo March [R29 898] Weakness of left lower extremity     4/15/2022 10:45 AM Belle Renteria [R32] Bladder incontinence     4/15/2022 10:45 AM Belle Renteria [G83 4] Cauda equina syndrome ([address] )     4/15/2022  3:21 PM Lo March [R32] Urinary incontinence     4/15/2022  3:22 PM Lo March [M54 50] Low back pain       ED Disposition     ED Disposition Condition Date/Time Comment    Admit Stable Fri Apr 15, 2022 10:22 AM Case was discussed with Dr Sury Sadler and the patient's admission status was agreed to be Admission Status: observation status to the service of Dr Abigail Currie           Follow-up Information    None         Current Discharge Medication List      CONTINUE these medications which have NOT CHANGED    Details   aspirin-acetaminophen-caffeine (EXCEDRIN MIGRAINE) 250-250-65 MG per tablet Take 1 tablet by mouth every 6 (six) hours as needed for headaches      Ibuprofen (MOTRIN PO) Take by mouth naproxen (NAPROSYN) 375 mg tablet Take 1 tablet (375 mg total) by mouth 2 (two) times a day with meals for 20 doses  Qty: 20 tablet, Refills: 0    Associated Diagnoses: Rotator cuff injury, right, initial encounter           No discharge procedures on file  PDMP Review       Value Time User    PDMP Reviewed  Yes 4/14/2022  4:15 PM Sandra Salas MD           ED Provider  Attending physically available and evaluated Hal Garcia I managed the patient along with the ED Attending      Electronically Signed by         Juancarlos Dennis DO  04/15/22 8964

## 2022-04-15 NOTE — H&P
INTERNAL MEDICINE RESIDENCY ADMISSION H&P     Name: Timothy Butler   Age & Sex: 27 y o  male   MRN: [id number]  Unit/Bed#: [facility] 621-01   Encounter: [id number]  Primary Care Provider: No primary care provider on file  Code Status: Level 1 - Full Code  Admission Status: INPATIENT   Disposition: Patient requires Med/Surg    Admit to team: SOD Team A    ASSESSMENT/PLAN     Principal Problem:    Suspected cauda equina syndrome  Active Problems:    Urinary incontinence    Back pain    Concussion without loss of consciousness      * Suspected cauda equina syndrome  Assessment & Plan  Patient presenting with acute onset bladder incontinence, obstipation, constipation, decreased inner thigh and genital sensation, and inability to achieve erection x2 days in setting of worsening lumbar back pain  Plan:  -rule-out cauda equina  -neuro exam with multiple abnormal findings, however non-contrast MRI of lumbar and thoracic spine yesterday did not show obvious spinal cord pathology including disc herniation  However, given exam and history of traumatic event, suspicion for cauda equina syndrome vs other multifocal neurologic disorder on ddx until contrast MRI obtained  -neurosurgery consulted, recommended complete spine (cervical, thoracic, lumbar) MRI with and without contrast  Pre medicate with 5 mg valium  Add dilaudid if pain limiting ability to lay still/flat  -decadron 10 mg IV now then 4 mg q6 hr after until spinal cord compression definitively ruled out  -pain regimen as follows: scheduled tylenol q6h, PRN oxy 2 5 or 5 mg IR q4h mild or moderate pain, PRN dilaudid 0 2 mg IV breakthrough   PRN robaxin  -urinary retention protocol in place to determine need for condom vs ling cath  -likely neuro consult pending MRI results  -neuro checks q6 hours      Urinary incontinence  Assessment & Plan  As part of suspected cauda equina syndrome  No concern for infectious etiology  Patient cannot feel sensation of full bladder, initiate urination, or feel urination  UA negative for signs of infection    Plan:  Urinary retention protocol in place  Maintain condom cath vs ling cath pending bladder scan  F/u MRI    Back pain  Assessment & Plan  Midline tenderness present in lumbar spine, no step-off    Acute on chronic 2/2 fall from work injury  Continue steroids and pain regimen  F/u MRI    Concussion without loss of consciousness  Assessment & Plan  2/2 traumatic work injury  Continues to suffer from daily HA, light sensitivity    Plan:  -pain control per other plans (scheduled tylenol, PRN oxy)  -if HA refractory consider migraine cocktail and toradol  -cont steroids      VTE Pharmacologic Prophylaxis: Heparin  VTE Mechanical Prophylaxis: sequential compression device    CHIEF COMPLAINT     Chief Complaint   Patient presents with    Back Pain     Pt was injured at work a few weeks ago, has been following up with ortho, has been incontinent of urine,  yesterday had MRI, but was unable to tolerate it with contrast      HISTORY OF PRESENT ILLNESS     28 yo male with prior L4-L5 discectomy with full return to baseline, on no home medications, is presenting to [facility] ED with CC of acute onset urinary incontinence, decreased genital and rectal sensation, and b/l LE weakness in the setting of worsening lumbo-sacral back pain  One month ago the patient had a work injury 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  Heavy boxes and supplies fell, striking him in the head, causing him to fall  Denies LOC  He was evaluated in the ED with imaging immediately after injury and there was no acute intracranial pathology or bony fracture  After the strike and fall, patient did experience immediate headache, blurred vision, delayed cognition and memory, and diffuse back and neck pain  In the past month, he has been suffering from S&S of concussion including continued light sensitivity, daily HA on awakening, and eye fatigue   He has been taking only tylenol and motrin  He has an upcoming appointment with neurology for ongoing HA/concussive symptoms  He also suffered a right shoulder injury, for which he was seeing orthopedics in the outpatient office yesterday when he went to stand up and suddenly experienced lower extremity weakness with increased low back pain, inability to ambulate, as well as loss of urine  He could not feel himself urinating  He then presented to [facility] ED yesterday after that acute event  They recommended [facility], however patient left AMA because he was "soaked in urine" and wanted to clean up  Patient states he got home by being wheeled out of hospital in wheelchair, and then his brother carried him around from there  He presented to [facility] ED this am because he continues to have urinary incontinence, and woke up soaked in urine  He also admits to inability to achieve erection recently, as well as decreased "funny" sensation between b/l thighs and over genitals and rectum  He has not had a BM in a few days and states he is not passing gas  Also endorsing LLE weakness, but also strength is limited 2/2 low back pain  He also has numbness on the top of his left foot  Denies: abd pain, CP, n/v,  fever/chills    REVIEW OF SYSTEMS     Review of Systems   Constitutional: Positive for activity change (decreased 2/2 pain and weakness)  Negative for chills, fatigue and fever  Respiratory: Positive for shortness of breath (intermittent, 2/2 pain)  Negative for cough  Cardiovascular: Negative for chest pain, palpitations and leg swelling  Gastrointestinal: Positive for constipation (no BM 3 days), nausea (occassionally with HA) and vomiting (occasionally with HA)  Negative for abdominal distention, abdominal pain and diarrhea  Genitourinary: Positive for difficulty urinating and enuresis          Loss of urinary control  Inability to achieve erection  Decreased genital sensation   Musculoskeletal: Positive for back pain (1 month entire spinre, worse in lumbosacral midline last few days) and gait problem (2/2 LE weakness)  Neurological: Positive for weakness (RLE weakness throughout, worse in ankle than in thigh  also limited 2/2 pain) and headaches  Negative for seizures, facial asymmetry, speech difficulty and light-headedness  Decreased sensation between thighs, on genitals, and top of left foot       OBJECTIVE     Vitals:    04/15/22 0953   BP: 139/88   BP Location: Left arm   Pulse: 84   Resp: 18   Temp: 98 6 °F (37 °C)   TempSrc: Tympanic   SpO2: 99%      Temperature:   Temp (24hrs), Av 4 °F (36 9 °C), Min:98 2 °F (36 8 °C), Max:98 6 °F (37 °C)    Temperature: 98 6 °F (37 °C)  Intake & Output:  I/O     None        Weights: There is no height or weight on file to calculate BMI  Weight (last 2 days)     None        Physical Exam  Constitutional:       General: He is in acute distress  Appearance: He is not ill-appearing, toxic-appearing or diaphoretic  Eyes:      Extraocular Movements: Extraocular movements intact  Pupils: Pupils are equal, round, and reactive to light  Cardiovascular:      Rate and Rhythm: Normal rate and regular rhythm  Pulses: Normal pulses  Heart sounds: No murmur heard  Pulmonary:      Effort: Pulmonary effort is normal  No respiratory distress  Breath sounds: No wheezing  Abdominal:      General: There is no distension  Palpations: Abdomen is soft  Tenderness: There is no abdominal tenderness  There is no guarding  Musculoskeletal:         General: Tenderness present  Right lower leg: No edema  Left lower leg: No edema  Skin:     General: Skin is warm  Neurological:      Mental Status: He is alert and oriented to person, place, and time  Cranial Nerves: No cranial nerve deficit  Sensory: Sensory deficit present  Motor: Weakness present        Deep Tendon Reflexes: Reflexes abnormal       Comments: Decreased sharp sensation(perceiving as dull): between thighs, over top of right foot, around b/l collarbones    Left Patellar reflex 1/4   Psychiatric:         Mood and Affect: Mood normal          Behavior: Behavior normal          Thought Content: Thought content normal          Judgment: Judgment normal        PAST MEDICAL HISTORY     Past Medical History:   Diagnosis Date    Chronic back pain      PAST SURGICAL HISTORY     Past Surgical History:   Procedure Laterality Date    BACK SURGERY  2019    lumbar    ORTHOPEDIC SURGERY      SHOULDER SURGERY Right     2019     SOCIAL & FAMILY HISTORY     Social History     Substance and Sexual Activity   Alcohol Use Never     Substance and Sexual Activity   Alcohol Use Never        Substance and Sexual Activity   Drug Use Never     Social History     Tobacco Use   Smoking Status Current Every Day Smoker   Smokeless Tobacco Never Used     Family History   Problem Relation Age of Onset    No Known Problems Mother     No Known Problems Father      LABORATORY DATA     Labs: I have personally reviewed pertinent reports  Results from last 7 days   Lab Units 04/15/22  1030 04/14/22  1726   WBC Thousand/uL 12 07* 11 10*   HEMOGLOBIN g/dL 16 3 16 4   HEMATOCRIT % 48 1 47 8   PLATELETS Thousands/uL 210 188   NEUTROS PCT % 86* 79*   MONOS PCT % 5 5      Results from last 7 days   Lab Units 04/15/22  1030 04/14/22  1726   POTASSIUM mmol/L 4 2 3 8   CHLORIDE mmol/L 108 103   CO2 mmol/L 27 26   BUN mg/dL 13 12   CREATININE mg/dL 1 04 1 08   CALCIUM mg/dL 9 7 9 1                          Micro:  No results found for: BLOODCX, URINECX, WOUNDCULT, SPUTUMCULTUR  IMAGING & DIAGNOSTIC TESTS     Imaging: I have personally reviewed pertinent reports  No results found  EKG, Pathology, and Other Studies: I have personally reviewed pertinent reports       ALLERGIES     Allergies   Allergen Reactions    Morphine Hives     MEDICATIONS PRIOR TO ARRIVAL     Prior to Admission medications Medication Sig Start Date End Date Taking? Authorizing Provider   aspirin-acetaminophen-caffeine (EXCEDRIN MIGRAINE) 272-743-55 MG per tablet Take 1 tablet by mouth every 6 (six) hours as needed for headaches    Historical Provider, MD   Ibuprofen (MOTRIN PO) Take by mouth    Historical Provider, MD   naproxen (NAPROSYN) 375 mg tablet Take 1 tablet (375 mg total) by mouth 2 (two) times a day with meals for 20 doses 3/11/22 4/14/22  Anne-Marie Horner MD     MEDICATIONS ADMINISTERED IN LAST 24 HOURS     Medication Administration - last 24 hours from 04/14/2022 1533 to 04/15/2022 1533       Date/Time Order Dose Route Action Action by     04/15/2022 1032 ondansetron (ZOFRAN) injection 4 mg 4 mg Intravenous Given Terry Cook RN     04/15/2022 1038 HYDROmorphone (DILAUDID) injection 0 5 mg 0 5 mg Intravenous Given Terry Cook RN     04/15/2022 1359 diazepam (VALIUM) injection 5 mg 5 mg Intravenous Given Jan Prather RN     04/15/2022 1215 acetaminophen (TYLENOL) tablet 650 mg 650 mg Oral Given Jan Prather RN     04/15/2022 1209 dexamethasone (PF) (DECADRON) injection 10 mg 10 mg Intravenous Given Jan Prather RN        CURRENT MEDICATIONS     Current Facility-Administered Medications   Medication Dose Route Frequency Provider Last Rate    acetaminophen  650 mg Oral Q6H [facility] Belle Buck, DO      dexamethasone  4 mg Intravenous Q6H [facility] Belle Buck DO      diphenhydrAMINE  25 mg Oral Q6H PRN Belle Buck DO      heparin (porcine)  5,000 Units Subcutaneous Q8H [facility] Belle Buck, DO      oxyCODONE  2 5 mg Oral Q4H PRN Belle Buck DO      Or    oxyCODONE  5 mg Oral Q4H PRN Belle Buck, DO          diphenhydrAMINE, 25 mg, Q6H PRN  oxyCODONE, 2 5 mg, Q4H PRN   Or  oxyCODONE, 5 mg, Q4H PRN        Admission Time  I spent 60 minutes admitting the patient    This involved direct patient contact where I performed a full history and physical, reviewing previous records, and reviewing laboratory and other diagnostic studies  Portions of the record may have been created with voice recognition software  Occasional wrong word or "sound a like" substitutions may have occurred due to the inherent limitations of voice recognition software    Read the chart carefully and recognize, using context, where substitutions have occurred     ==  Caren Gambino, [facility]  Internal Medicine Residency PGY-2

## 2022-04-15 NOTE — DISCHARGE INSTRUCTIONS
Return to er right away if you wish further evaluation, treatment or expert consultation  You are leaving ama, this is very dangerous, return to er if you have any other concerns, worsening sx or new sx

## 2022-04-16 VITALS
WEIGHT: 153 LBS | OXYGEN SATURATION: 98 % | HEIGHT: 72 IN | RESPIRATION RATE: 18 BRPM | HEART RATE: 76 BPM | TEMPERATURE: 98.4 F | DIASTOLIC BLOOD PRESSURE: 73 MMHG | SYSTOLIC BLOOD PRESSURE: 119 MMHG | BODY MASS INDEX: 20.72 KG/M2

## 2022-04-16 PROBLEM — M54.50 ACUTE MIDLINE LOW BACK PAIN: Status: ACTIVE | Noted: 2022-04-15

## 2022-04-16 LAB
ANION GAP SERPL CALCULATED.3IONS-SCNC: 8 MMOL/L (ref 4–13)
BUN SERPL-MCNC: 10 MG/DL (ref 5–25)
CALCIUM SERPL-MCNC: 8.1 MG/DL (ref 8.3–10.1)
CHLORIDE SERPL-SCNC: 108 MMOL/L (ref 100–108)
CO2 SERPL-SCNC: 20 MMOL/L (ref 21–32)
CREAT SERPL-MCNC: 0.57 MG/DL (ref 0.6–1.3)
ERYTHROCYTE [DISTWIDTH] IN BLOOD BY AUTOMATED COUNT: 12.7 % (ref 11.6–15.1)
GFR SERPL CREATININE-BSD FRML MDRD: 135 ML/MIN/1.73SQ M
GLUCOSE SERPL-MCNC: 104 MG/DL (ref 65–140)
HBV SURFACE AG SER QL: NORMAL
HCT VFR BLD AUTO: 39.9 % (ref 36.5–49.3)
HCV AB SER QL: NORMAL
HGB BLD-MCNC: 13 G/DL (ref 12–17)
HIV 1+2 AB+HIV1 P24 AG SERPL QL IA: NORMAL
HIV1 P24 AG SER QL: NORMAL
MCH RBC QN AUTO: 30.9 PG (ref 26.8–34.3)
MCHC RBC AUTO-ENTMCNC: 32.6 G/DL (ref 31.4–37.4)
MCV RBC AUTO: 95 FL (ref 82–98)
PLATELET # BLD AUTO: 168 THOUSANDS/UL (ref 149–390)
PMV BLD AUTO: 10.9 FL (ref 8.9–12.7)
POTASSIUM SERPL-SCNC: 4.2 MMOL/L (ref 3.5–5.3)
RBC # BLD AUTO: 4.21 MILLION/UL (ref 3.88–5.62)
SODIUM SERPL-SCNC: 136 MMOL/L (ref 136–145)
WBC # BLD AUTO: 14.52 THOUSAND/UL (ref 4.31–10.16)

## 2022-04-16 PROCEDURE — 99233 SBSQ HOSP IP/OBS HIGH 50: CPT | Performed by: INTERNAL MEDICINE

## 2022-04-16 PROCEDURE — 99222 1ST HOSP IP/OBS MODERATE 55: CPT | Performed by: PSYCHIATRY & NEUROLOGY

## 2022-04-16 PROCEDURE — 97163 PT EVAL HIGH COMPLEX 45 MIN: CPT

## 2022-04-16 PROCEDURE — 86803 HEPATITIS C AB TEST: CPT

## 2022-04-16 PROCEDURE — 85027 COMPLETE CBC AUTOMATED: CPT | Performed by: STUDENT IN AN ORGANIZED HEALTH CARE EDUCATION/TRAINING PROGRAM

## 2022-04-16 PROCEDURE — 80048 BASIC METABOLIC PNL TOTAL CA: CPT | Performed by: STUDENT IN AN ORGANIZED HEALTH CARE EDUCATION/TRAINING PROGRAM

## 2022-04-16 PROCEDURE — 97167 OT EVAL HIGH COMPLEX 60 MIN: CPT

## 2022-04-16 PROCEDURE — 87806 HIV AG W/HIV1&2 ANTB W/OPTIC: CPT

## 2022-04-16 PROCEDURE — 87340 HEPATITIS B SURFACE AG IA: CPT

## 2022-04-16 RX ORDER — KETOROLAC TROMETHAMINE 10 MG/1
10 TABLET, FILM COATED ORAL EVERY 6 HOURS PRN
Status: DISCONTINUED | OUTPATIENT
Start: 2022-04-16 | End: 2022-04-16 | Stop reason: HOSPADM

## 2022-04-16 RX ORDER — METHOCARBAMOL 500 MG/1
500 TABLET, FILM COATED ORAL EVERY 6 HOURS PRN
Status: DISCONTINUED | OUTPATIENT
Start: 2022-04-16 | End: 2022-04-16 | Stop reason: HOSPADM

## 2022-04-16 RX ORDER — SENNOSIDES 8.6 MG
1 TABLET ORAL
Status: DISCONTINUED | OUTPATIENT
Start: 2022-04-16 | End: 2022-04-16 | Stop reason: HOSPADM

## 2022-04-16 RX ORDER — METHYLPREDNISOLONE 4 MG/1
16 TABLET ORAL ONCE
Status: COMPLETED | OUTPATIENT
Start: 2022-04-16 | End: 2022-04-16

## 2022-04-16 RX ORDER — POLYETHYLENE GLYCOL 3350 17 G/17G
17 POWDER, FOR SOLUTION ORAL DAILY
Status: DISCONTINUED | OUTPATIENT
Start: 2022-04-16 | End: 2022-04-16 | Stop reason: HOSPADM

## 2022-04-16 RX ORDER — GABAPENTIN 300 MG/1
300 CAPSULE ORAL 3 TIMES DAILY
Status: DISCONTINUED | OUTPATIENT
Start: 2022-04-16 | End: 2022-04-16 | Stop reason: HOSPADM

## 2022-04-16 RX ORDER — DIPHENHYDRAMINE HCL 25 MG
25 TABLET ORAL ONCE
Status: COMPLETED | OUTPATIENT
Start: 2022-04-16 | End: 2022-04-16

## 2022-04-16 RX ORDER — MAGNESIUM SULFATE HEPTAHYDRATE 40 MG/ML
2 INJECTION, SOLUTION INTRAVENOUS ONCE
Status: COMPLETED | OUTPATIENT
Start: 2022-04-16 | End: 2022-04-16

## 2022-04-16 RX ORDER — LIDOCAINE 50 MG/G
1 PATCH TOPICAL DAILY
Status: DISCONTINUED | OUTPATIENT
Start: 2022-04-16 | End: 2022-04-16 | Stop reason: HOSPADM

## 2022-04-16 RX ADMIN — ACETAMINOPHEN 650 MG: 325 TABLET ORAL at 11:56

## 2022-04-16 RX ADMIN — ACETAMINOPHEN 650 MG: 325 TABLET ORAL at 17:00

## 2022-04-16 RX ADMIN — GABAPENTIN 300 MG: 300 CAPSULE ORAL at 10:20

## 2022-04-16 RX ADMIN — ACETAMINOPHEN 650 MG: 325 TABLET ORAL at 05:47

## 2022-04-16 RX ADMIN — DIPHENHYDRAMINE HCL 25 MG: 25 TABLET ORAL at 11:57

## 2022-04-16 RX ADMIN — GABAPENTIN 300 MG: 300 CAPSULE ORAL at 16:59

## 2022-04-16 RX ADMIN — METHOCARBAMOL 500 MG: 500 TABLET ORAL at 10:20

## 2022-04-16 RX ADMIN — KETOROLAC TROMETHAMINE 10 MG: 10 TABLET, FILM COATED ORAL at 11:34

## 2022-04-16 RX ADMIN — POLYETHYLENE GLYCOL 3350 17 G: 17 POWDER, FOR SOLUTION ORAL at 12:00

## 2022-04-16 RX ADMIN — OXYCODONE HYDROCHLORIDE 5 MG: 5 TABLET ORAL at 07:59

## 2022-04-16 RX ADMIN — METHOCARBAMOL 500 MG: 500 TABLET ORAL at 16:59

## 2022-04-16 RX ADMIN — MAGNESIUM SULFATE HEPTAHYDRATE 2 G: 40 INJECTION, SOLUTION INTRAVENOUS at 11:58

## 2022-04-16 RX ADMIN — METHYLPREDNISOLONE 16 MG: 4 TABLET ORAL at 11:34

## 2022-04-16 RX ADMIN — DEXAMETHASONE SODIUM PHOSPHATE 4 MG: 4 INJECTION INTRA-ARTICULAR; INTRALESIONAL; INTRAMUSCULAR; INTRAVENOUS; SOFT TISSUE at 05:47

## 2022-04-16 RX ADMIN — HEPARIN SODIUM 5000 UNITS: 5000 INJECTION INTRAVENOUS; SUBCUTANEOUS at 14:12

## 2022-04-16 RX ADMIN — OXYCODONE HYDROCHLORIDE 5 MG: 5 TABLET ORAL at 14:18

## 2022-04-16 RX ADMIN — HEPARIN SODIUM 5000 UNITS: 5000 INJECTION INTRAVENOUS; SUBCUTANEOUS at 05:47

## 2022-04-16 RX ADMIN — OXYCODONE HYDROCHLORIDE 5 MG: 5 TABLET ORAL at 02:14

## 2022-04-16 RX ADMIN — LIDOCAINE 5% 1 PATCH: 700 PATCH TOPICAL at 10:21

## 2022-04-16 NOTE — CONSULTS
NEUROLOGY RESIDENCY CONSULT NOTE     Name: Juliet Carrasquillo   Age & Sex: 27 y o  male   MRN: [id number]  [facility]   Encounter: [id number]  Length of Stay: 1    ASSESSMENT & PLAN     Concussion without loss of consciousness  Assessment & Plan  Concussion on 3/10/2022    Urinary incontinence  Assessment & Plan  Patient states that this is starting to improve with improved sensation of rectal/genital areas  Back pain  Assessment & Plan  Patient is a 27year old man with concussion 3/10/2022 and L4-L5 discectomy 2019 who presented on admission on 4/15 due to acute onset urinary incontinence, decreased genital and rectal sensation and bilateral lower extremity after bilateral leg weakness, numbness of the genital and rectal areas, and urinary incontinence when he stood up during his orthopedics appointment but before his received his cortisone shots on right shoulder  Patient has had concussion on 3/10/2022 after which patient has had headaches (ongoing), bilateral shooting leg pain which has now resolved  Rectal, genital sensation and urinary incontinence is improving according to the patient but back/anal pain, shoulder pain are worsening  MRI c-spine, MRI t-spine, and MRI l-spine did not show findings significant enough to cause patient's symptoms and has ruled out cauda equina syndrome  Physical exam showed strength is pain limited but when overcome, patient symmetric 5/5 strength bilaterally upper and lower extremities  Sensory exam showed nonlocalizable sensory deficits with overall pinprick sensation loss with occasional patches of normal sensation and proprioception loss of left foot compared to right foot and decreased vibration loss of left>right lower extremity  On rectal exam, patient had intact anal wink reflex  MRI cervical spine wo contrast    Result Date: 4/15/2022  Impression: Unremarkable MRI of the cervical spine   Workstation performed: [id number]     MRI thoracic spine wo contrast    Result Date: 4/16/2022  Impression: Unremarkable MRI of the thoracic spine  Workstation performed: [id number]     MRI lumbar spine w wo contrast    Result Date: 4/15/2022  Impression: Stable exam   No abnormal leptomeningeal or cauda equina enhancement  Workstation performed: [id number]     MRI follow up neuro    Result Date: 4/15/2022  Impression: Incomplete examination  Patient could not tolerate the examination  -No significant canal stenosis or high-grade neural foraminal narrowing on this abbreviated limited exam using sagittal T2 sequence  -Normal signal of the cervical spinal cord on sagittal T2 sequence  The study was marked in [facility] for immediate notification  Workstation performed: [id number]     Impression: Patient's strength is bilaterally pain limited (but 5/5 bilaterally) with nonlocalizable numbness which seems to correspond with patient's overall pain  Most likely numbness (including genital and rectal numbness) and pain limited weakness are due to patient's amplified pain and not likely due to any known central or peripheral neurologic causes of patient's pain  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  Imaging negative for cauda equina syndrome  Plan:  Pain control per primary  PT recommends post acute rehab  Reviewed MRI findings  No further inpatient neurology recommendations; Please contact us if further questions  Notified and Discussed with SOD concerning consult            Patient has followup with neurology 4/22/2022    Pending for discharge: Pending possible rehab    SUBJECTIVE     Reason for Consult / Principal Problem: Back pain and urinary incontinence  Hx and PE limited by: None    HPI: Cyndee Day is a 27 y o   male with hx of concussion without LOC on 3/10, L4-L5 discectomy 2019 who presented 4/15 due to acute onset urinary incontinence, decreased genital and rectal sensation and bilateral lower extremity      Patient suffered a right shoulder injury and was at an outpatient office 4/14 when he went to stand up and suddenly experienced lower extremity weakness with increased low back pain, difficulty ambulating and urinary incontinence  Patient then presented to [place] ED 4/14 and recommended transfer to [facility] for MRI but left AMA  He then came to [facility] ED 4/15 morning because he continued to have urinary incontinence  It was also noted patient have inability to have an erection and an unusual sensation between bilateral thigs and over genitals and rectum  Also bowel retention was noted on admission  Neurosurgery was consulted 4/15 who which MRI cervical spine, MRI thoracic and lumbar spine without compressive etiology determined that symptoms are less likely central origin  Neurosurgery considered steroid injections for patients  When patient seen today, patient stated he went to go to his orthopedics on 4/14/2022 for right shoulder pain  He stated that he stood up, he has had sudden weakness of bilateral legs with pain as well not being able to sense if he is able to urinate  Patient has not yet received his corticosteroid shot at that point, but did receive the injection before going to the ED  Patient currently having pain anally which radiates to his chest and also having mostly right shoulder pain and headache with photophobia  He stated that today, his rectal/genital sensation deficit is resolving and he is starting feel his urination again  He does state overall pain has worsened over time since his admission  Patient stated that current pain medications he has taken have not become better  Pertinent prior history:  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  Patient had no LOC and on ED workup had no acute intracranial pathology or bony fracture  Patient did have immediate headache, blurred vision, delayed cognition and memory, and diffuse back and neck pain  He has continued light sensitivity, daily HA on awakening and eye fatigue   He also had back pain that radiated to his feet bilaterally which lasted for a few weeks  He is noted to have appointment for HA symptoms with neurology      Inpatient consult to Neurology  Consult performed by: Araceli Roldan MD  Consult ordered by: Saray Dahl DO          Historical Information   Past Medical History:   Diagnosis Date    Chronic back pain      Past Surgical History:   Procedure Laterality Date    BACK SURGERY  2019    lumbar    ORTHOPEDIC SURGERY      SHOULDER SURGERY Right     2019     Social History   Social History     Substance and Sexual Activity   Alcohol Use Never     Social History     Substance and Sexual Activity   Drug Use Never     E-Cigarette/Vaping    E-Cigarette Use Never User      E-Cigarette/Vaping Substances    Nicotine No     THC No     CBD No     Flavoring No     Other No     Unknown No      Social History     Tobacco Use   Smoking Status Current Every Day Smoker   Smokeless Tobacco Never Used     Family History:   Family History   Problem Relation Age of Onset    No Known Problems Mother     No Known Problems Father      Meds/Allergies   current meds:   Current Facility-Administered Medications   Medication Dose Route Frequency    acetaminophen (TYLENOL) tablet 650 mg  650 mg Oral Q6H [facility]    gabapentin (NEURONTIN) capsule 300 mg  300 mg Oral TID    heparin (porcine) subcutaneous injection 5,000 Units  5,000 Units Subcutaneous Q8H [facility]    ketorolac (TORADOL) tablet 10 mg  10 mg Oral Q6H PRN    lidocaine (LIDODERM) 5 % patch 1 patch  1 patch Topical Daily    methocarbamol (ROBAXIN) tablet 500 mg  500 mg Oral Q6H PRN    ondansetron (ZOFRAN) injection 4 mg  4 mg Intravenous Once PRN    oxyCODONE (ROXICODONE) IR tablet 2 5 mg  2 5 mg Oral Q4H PRN    Or    oxyCODONE (ROXICODONE) IR tablet 5 mg  5 mg Oral Q4H PRN    polyethylene glycol (MIRALAX) packet 17 g  17 g Oral Daily    senna (SENOKOT) tablet 8 6 mg  1 tablet Oral HS    and PTA meds:   Prior to Admission Medications   Prescriptions Last Dose Informant Patient Reported? Taking? Ibuprofen (MOTRIN PO) Past Week at Unknown time  Yes Yes   Sig: Take by mouth   aspirin-acetaminophen-caffeine (EXCEDRIN MIGRAINE) 250-250-65 MG per tablet Past Week at Unknown time  Yes Yes   Sig: Take 1 tablet by mouth every 6 (six) hours as needed for headaches   naproxen (NAPROSYN) 375 mg tablet  Self No No   Sig: Take 1 tablet (375 mg total) by mouth 2 (two) times a day with meals for 20 doses      Facility-Administered Medications: None     Allergies   Allergen Reactions    Morphine Hives       Review of previous medical records was  completed  Review of Systems   Constitutional: Positive for activity change  Genitourinary: Positive for difficulty urinating  Musculoskeletal: Positive for gait problem (Due to pain)  Neurological: Positive for weakness, numbness and headaches  Psychiatric/Behavioral: The patient is nervous/anxious  OBJECTIVE     Patient ID: Leopoldo Rodrigues is a 27 y o  male  Vitals:   Vitals:    22 0111 22 0755 22 0810 22 1410   BP: 113/63 121/57  119/73   Pulse: 65 67  76   Resp:  18     Temp:  98 1 °F (36 7 °C)  98 4 °F (36 9 °C)   TempSrc:       SpO2: 94% 99% 97% 98%   Weight:       Height:          Body mass index is 20 75 kg/m²  Intake/Output Summary (Last 24 hours) at 2022  Last data filed at 4/15/2022 2120  Gross per 24 hour   Intake 500 ml   Output --   Net 500 ml       Temperature:   Temp (24hrs), Av 1 °F (36 7 °C), Min:97 7 °F (36 5 °C), Max:98 4 °F (36 9 °C)    Temperature: 98 4 °F (36 9 °C)    Invasive Devices: Invasive Devices  Report    None                 Physical Exam  Constitutional:       General: He is in acute distress (Due to overall pain)  HENT:      Head: Normocephalic and atraumatic        Mouth/Throat:      Mouth: Mucous membranes are moist    Eyes:      Extraocular Movements: EOM normal       Pupils: Pupils are equal, round, and reactive to light  Cardiovascular:      Rate and Rhythm: Normal rate and regular rhythm  Pulses: Normal pulses  Heart sounds: Normal heart sounds  Pulmonary:      Effort: Pulmonary effort is normal       Breath sounds: Normal breath sounds  Abdominal:      General: Abdomen is flat  Musculoskeletal:         General: Normal range of motion  Cervical back: Normal range of motion  Neurological:      Mental Status: He is alert and oriented to person, place, and time  Coordination: Finger-Nose-Finger Test normal       Deep Tendon Reflexes: Strength normal       Reflex Scores:       Bicep reflexes are 3+ on the right side and 3+ on the left side  Brachioradialis reflexes are 3+ on the right side and 3+ on the left side  Patellar reflexes are 3+ on the right side and 3+ on the left side  Achilles reflexes are 2+ on the right side and 2+ on the left side  Psychiatric:         Speech: Speech normal           Neurologic Exam     Mental Status   Oriented to person, place, and time  Oriented to year and month  Attention: normal  Concentration: normal    Speech: speech is normal   Level of consciousness: alert  Patient in distress     Cranial Nerves     CN II   Visual fields full to confrontation  CN III, IV, VI   Pupils are equal, round, and reactive to light  Extraocular motions are normal      CN V   Facial sensation intact  CN VII   Facial expression full, symmetric  CN XI   CN XI normal      CN XII   CN XII normal      Motor Exam     Strength   Strength 5/5 throughout  Strength pain limited on bilateral upper and lower extremities    Anal wink intact     Sensory Exam   Right arm light touch: normal  Left arm light touch: normal  Right leg light touch: normal  Left leg light touch: decreased from knee  Right arm vibration: normal  Left arm vibration: normal  Right leg vibration: Decreased on big toe    Left leg vibration: Decreased vibration sensation on left medial ankle and left big toe  Right leg proprioception: normal  Left leg proprioception: Decreased on big toe  Right side: Pinprick sensation loss from dorsal surface to proximal the elbow    Left side: Pinprick sensation loss to left shoulder to digits but has area of normal pinprick sensation on left shoulder     Gait, Coordination, and Reflexes     Coordination   Finger to nose coordination: normal    Reflexes   Right brachioradialis: 3+  Left brachioradialis: 3+  Right biceps: 3+  Left biceps: 3+  Right patellar: 3+  Left patellar: 3+  Right achilles: 2+  Left achilles: 2+  Right plantar: normal  Left plantar: normalSymmetric hyperreflexia most likely in setting of patient's pain induced anxiety        LABORATORY DATA     Labs: I have personally reviewed pertinent reports  Results from last 7 days   Lab Units 04/16/22  0544 04/15/22  1030 04/14/22  1726   WBC Thousand/uL 14 52* 12 07* 11 10*   HEMOGLOBIN g/dL 13 0 16 3 16 4   HEMATOCRIT % 39 9 48 1 47 8   PLATELETS Thousands/uL 168 210 188   NEUTROS PCT %  --  86* 79*   MONOS PCT %  --  5 5      Results from last 7 days   Lab Units 04/16/22  0544 04/15/22  1030 04/14/22  1726   POTASSIUM mmol/L 4 2 4 2 3 8   CHLORIDE mmol/L 108 108 103   CO2 mmol/L 20* 27 26   BUN mg/dL 10 13 12   CREATININE mg/dL 0 57* 1 04 1 08   CALCIUM mg/dL 8 1* 9 7 9 1                            IMAGING & DIAGNOSTIC TESTING     Radiology Results: I have personally reviewed pertinent films in PACS  MRI lumbar spine w wo contrast   Final Result by Natalio Nazario MD (04/15 3304)      Stable exam   No abnormal leptomeningeal or cauda equina enhancement  Workstation performed: [id number]         MRI thoracic spine wo contrast   Final Result by Natalio Nazario MD (04/16 0051)      Unremarkable MRI of the thoracic spine              Workstation performed: [id number]         MRI cervical spine wo contrast   Final Result by Natalio Nazario MD (01/57 5884) Unremarkable MRI of the cervical spine  Workstation performed: [id number]         MRI follow up neuro   Final Result by Pedro Pablo Villalta MD (01/00 3838)      Incomplete examination  Patient could not tolerate the examination    -No significant canal stenosis or high-grade neural foraminal narrowing on this abbreviated limited exam using sagittal T2 sequence    -Normal signal of the cervical spinal cord on sagittal T2 sequence  The study was marked in [facility] for immediate notification  Workstation performed: [id number]             Other Diagnostic Testing: I have personally reviewed pertinent reports  ACTIVE MEDICATIONS     Current Facility-Administered Medications   Medication Dose Route Frequency    acetaminophen (TYLENOL) tablet 650 mg  650 mg Oral Q6H [address]    gabapentin (NEURONTIN) capsule 300 mg  300 mg Oral TID    heparin (porcine) subcutaneous injection 5,000 Units  5,000 Units Subcutaneous Q8H [address]    ketorolac (TORADOL) tablet 10 mg  10 mg Oral Q6H PRN    lidocaine (LIDODERM) 5 % patch 1 patch  1 patch Topical Daily    methocarbamol (ROBAXIN) tablet 500 mg  500 mg Oral Q6H PRN    ondansetron (ZOFRAN) injection 4 mg  4 mg Intravenous Once PRN    oxyCODONE (ROXICODONE) IR tablet 2 5 mg  2 5 mg Oral Q4H PRN    Or    oxyCODONE (ROXICODONE) IR tablet 5 mg  5 mg Oral Q4H PRN    polyethylene glycol (MIRALAX) packet 17 g  17 g Oral Daily    senna (SENOKOT) tablet 8 6 mg  1 tablet Oral HS       Prior to Admission medications    Medication Sig Start Date End Date Taking?  Authorizing Provider   aspirin-acetaminophen-caffeine (EXCEDRIN MIGRAINE) 676-070-48 MG per tablet Take 1 tablet by mouth every 6 (six) hours as needed for headaches   Yes Historical Provider, MD   Ibuprofen (MOTRIN PO) Take by mouth   Yes Historical Provider, MD   naproxen (NAPROSYN) 375 mg tablet Take 1 tablet (375 mg total) by mouth 2 (two) times a day with meals for 20 doses 3/11/22 4/14/22  Anam Cole Nir Silva MD         CODE STATUS & ADVANCED DIRECTIVES     Code Status: Level 1 - Full Code  Advance Directive and Living Will:      Power of :    POLST:        VTE Pharmacologic Prophylaxis: Heparin  VTE Mechanical Prophylaxis: sequential compression device    ==  Kwame Mayers MD   [address] Neurology Residency, PGY-2

## 2022-04-16 NOTE — PROGRESS NOTES
INTERNAL MEDICINE RESIDENCY PROGRESS NOTE     Name: Josselin Berrios   Age & Sex: 27 y o  male   MRN: [id number]  Unit/Bed#: [facility] 621-01   Encounter: [id number]  Team: SOD Team A    PATIENT INFORMATION     Name: Josselin Berrios   Age & Sex: 27 y o  male   MRN: [id number] Stay Days: 1    ASSESSMENT/PLAN     Principal Problem:    Acute midline low back pain  Active Problems:    Urinary incontinence    Back pain    Concussion without loss of consciousness      * Acute midline low back pain  Assessment & Plan  Patient presenting with acute onset bladder incontinence, obstipation, constipation, decreased inner thigh and genital sensation, and inability to achieve erection x2 days in setting of worsening lumbar back pain  Plan:  -suspect most likely sciatica given MRI of entire spine was without evidence of nerve compression  -treat for acute back pain with methylprednisolone taper, gabapentin, robaxin, toradol, tylenol, lidoderm patch   -q shift neuro checks  -neurology consulted to weigh in on urinary incontinence  Question need for EMG? Patient has outpatient appointment on 4/22 to see them regarding his headaches  -d/c IV pain control  PRN oxy in place  -neurosurgery signed off  -maintain condom cath and monitor for signs of acute retention  -PT/OT ok to work with patient now given negative MRI      Urinary incontinence  Assessment & Plan  As part of suspected cauda equina syndrome  No concern for infectious etiology  Patient cannot feel sensation of full bladder, initiate urination, or feel urination  UA negative for signs of infection    Plan:  No imaging findings to explain incontinence  Neurology consulted for assistance  Urinary retention protocol in place     Maintain condom cath    Back pain  Assessment & Plan  Midline tenderness present in lumbar spine, no step-off    Likely sciatica/acute spasm at this point  Acute on chronic 2/2 fall from work injury  Continue steroids and pain regimen      Concussion without loss of consciousness  Assessment & Plan  2/2 traumatic work injury  Continues to suffer from daily HA, light sensitivity    Plan:  -pain control per other plans (scheduled tylenol, PRN oxy)  -neurology consulted for likely concussive symptoms  -cont steroids  -toradol, tylenol  -one time 2g IV mag + benadryl      Disposition: neurology consulted; possible d/c tmrw     SUBJECTIVE     Patient seen and examined  Got MRI under anesthesia yesterday which did not show evidence of spinal cord disease or cauda equina  LLE weakness now more consistently appearing 2/2 ability to cooperate given pain  PT/OT ok to evaluate patient now that MRI negative  Will consult neurology for their input on etiologies of this acute urinary incontinence  Patient's symptoms today now a bit more consistent with sciatica, except for incontinence  He states he was able to feel that he had to urinate today, but was unable to control stream output  He has a condom cath in place  He continues to have low back pain  Will treat more aggressively as sciatica and acute low back pain with robaxin, steroid taper, toradol/tylenol, lidoderm patch and gabapentin  PRN oxy for moderate/severe pain  Depending on neurology opinion and PT/OT evaluation, may consider PMR consult  Patient again woke up with a headache this morning  Will give migraine cocktail  Adding bowel regimen  Endorses ability to pass gas today      Denies: new neurological focal deficits, worsening LE weakness, fever/chills    Lab holiday tomorrow    OBJECTIVE     Vitals:    22 0036 22 0037 22 0111 22 0755   BP: 112/62 112/62 113/63 121/57   Pulse: 64 64 65 67   Resp:    18   Temp:    98 1 °F (36 7 °C)   TempSrc:       SpO2: 94% 94% 94% 99%   Weight:       Height:          Temperature:   Temp (24hrs), Av 1 °F (36 7 °C), Min:97 7 °F (36 5 °C), Max:98 3 °F (36 8 °C)    Temperature: 98 1 °F (36 7 °C)  Intake & Output:  I/O 04/14 0701  04/15 0700 04/15 0701 04/16 0700 04/16 0701 04/17 0700    I V  (mL/kg)  500 (7 2)     Total Intake(mL/kg)  500 (7 2)     Urine (mL/kg/hr)  300     Total Output  300     Net  +200                Weights:   IBW (Ideal Body Weight): 77 6 kg    Body mass index is 20 75 kg/m²  Weight (last 2 days)     Date/Time Weight    04/15/22 15:38:43 69 4 (153)        Physical Exam  Constitutional:       General: He is not in acute distress  Appearance: He is not toxic-appearing  Cardiovascular:      Rate and Rhythm: Normal rate and regular rhythm  Pulses: Normal pulses  Pulmonary:      Effort: Pulmonary effort is normal       Breath sounds: Normal breath sounds  Abdominal:      General: There is no distension  Palpations: Abdomen is soft  Tenderness: There is no abdominal tenderness  Musculoskeletal:      Right lower leg: No edema  Left lower leg: No edema  Comments: Midline back pain from L4 to coccyx  TTP over left glute  Skin:     General: Skin is warm  Neurological:      Mental Status: He is alert and oriented to person, place, and time  Psychiatric:         Mood and Affect: Mood normal          Thought Content: Thought content normal        LABORATORY DATA     Labs: I have personally reviewed pertinent reports  Results from last 7 days   Lab Units 04/16/22  0544 04/15/22  1030 04/14/22  1726   WBC Thousand/uL 14 52* 12 07* 11 10*   HEMOGLOBIN g/dL 13 0 16 3 16 4   HEMATOCRIT % 39 9 48 1 47 8   PLATELETS Thousands/uL 168 210 188   NEUTROS PCT %  --  86* 79*   MONOS PCT %  --  5 5      Results from last 7 days   Lab Units 04/16/22  0544 04/15/22  1030 04/14/22  1726   POTASSIUM mmol/L 4 2 4 2 3 8   CHLORIDE mmol/L 108 108 103   CO2 mmol/L 20* 27 26   BUN mg/dL 10 13 12   CREATININE mg/dL 0 57* 1 04 1 08   CALCIUM mg/dL 8 1* 9 7 9 1                            IMAGING & DIAGNOSTIC TESTING     Radiology Results: I have personally reviewed pertinent reports    MRI cervical spine wo contrast    Result Date: 4/15/2022  Impression: Unremarkable MRI of the cervical spine  Workstation performed: [id number]     MRI thoracic spine wo contrast    Result Date: 4/16/2022  Impression: Unremarkable MRI of the thoracic spine  Workstation performed: [id number]     MRI lumbar spine w wo contrast    Result Date: 4/15/2022  Impression: Stable exam   No abnormal leptomeningeal or cauda equina enhancement  Workstation performed: [id number]     MRI follow up neuro    Result Date: 4/15/2022  Impression: Incomplete examination  Patient could not tolerate the examination  -No significant canal stenosis or high-grade neural foraminal narrowing on this abbreviated limited exam using sagittal T2 sequence  -Normal signal of the cervical spinal cord on sagittal T2 sequence  The study was marked in [facility] for immediate notification  Workstation performed: [id number]     Other Diagnostic Testing: I have personally reviewed pertinent reports      ACTIVE MEDICATIONS     Current Facility-Administered Medications   Medication Dose Route Frequency    acetaminophen (TYLENOL) tablet 650 mg  650 mg Oral Q6H [facility]    diphenhydrAMINE (BENADRYL) tablet 25 mg  25 mg Oral Once    gabapentin (NEURONTIN) capsule 300 mg  300 mg Oral TID    heparin (porcine) subcutaneous injection 5,000 Units  5,000 Units Subcutaneous Q8H [facility]    ketorolac (TORADOL) tablet 10 mg  10 mg Oral Q6H PRN    lidocaine (LIDODERM) 5 % patch 1 patch  1 patch Topical Daily    magnesium sulfate 2 g/50 mL IVPB (premix) 2 g  2 g Intravenous Once    methocarbamol (ROBAXIN) tablet 500 mg  500 mg Oral Q6H PRN    methylprednisolone (MEDROL) tablet 16 mg  16 mg Oral Once    ondansetron (ZOFRAN) injection 4 mg  4 mg Intravenous Once PRN    oxyCODONE (ROXICODONE) IR tablet 2 5 mg  2 5 mg Oral Q4H PRN    Or    oxyCODONE (ROXICODONE) IR tablet 5 mg  5 mg Oral Q4H PRN    polyethylene glycol (MIRALAX) packet 17 g  17 g Oral Daily    senna (SENOKOT) tablet 8 6 mg  1 tablet Oral HS       VTE Pharmacologic Prophylaxis: Heparin  VTE Mechanical Prophylaxis: sequential compression device    Portions of the record may have been created with voice recognition software  Occasional wrong word or "sound a like" substitutions may have occurred due to the inherent limitations of voice recognition software    Read the chart carefully and recognize, using context, where substitutions have occurred   ==  Christal Amaya, [address] [facility]  Internal Medicine Residency PGY-2

## 2022-04-16 NOTE — OCCUPATIONAL THERAPY NOTE
Occupational Therapy Evaluation     Patient Name: Catrina العلي  BHEPF'B Date: 4/16/2022  Problem List  Principal Problem:    Acute midline low back pain  Active Problems:    Back pain    Urinary incontinence    Concussion without loss of consciousness    Past Medical History  Past Medical History:   Diagnosis Date    Chronic back pain      Past Surgical History  Past Surgical History:   Procedure Laterality Date    BACK SURGERY  2019    lumbar    ORTHOPEDIC SURGERY      SHOULDER SURGERY Right     2019 04/16/22 0937   OT Last Visit   OT Visit Date 04/16/22   Note Type   Note type Evaluation   Restrictions/Precautions   Other Precautions Spinal precautions;Pain;Multiple lines   Pain Assessment   Pain Assessment Tool FLACC   Pain Location/Orientation Location: Back   Hospital Pain Intervention(s) Repositioned; Ambulation/increased activity; Emotional support   Pain Rating: FLACC (Rest) - Face 0   Pain Rating: FLACC (Rest) - Legs 0   Pain Rating: FLACC (Rest) - Activity 0   Pain Rating: FLACC (Rest) - Cry 0   Pain Rating: FLACC (Rest) - Consolability 0   Score: FLACC (Rest) 0   Pain Rating: FLACC (Activity) - Face 1   Pain Rating: FLACC (Activity) - Legs 0   Pain Rating: FLACC (Activity) - Activity 1   Pain Rating: FLACC (Activity) - Cry 1   Pain Rating: FLACC (Activity) - Consolability 1   Score: FLACC (Activity) 4   Home Living   Type of Home House   Home Layout Two level;Stairs to enter with rails   Bathroom Shower/Tub Tub/shower unit   Bathroom Toilet Standard   Bathroom Equipment   (denies)   [address]   (denies)   Additional Comments Pt reports living in a 2 story home with 2 ANDI  Pt can not stay on the first floor  Prior Function   Level of [place] Independent with ADLs and functional mobility   Lives With Spouse; Family   Receives Help From Family   ADL Assistance Independent   IADLs Independent   Falls in the last 6 months 0   Vocational Full time employment   Lifestyle   Autonomy At baseline, pt is I with ADLS, IADLS and mobility without device  Reciprocal Relationships Pt lives with his significant other and young children    Service to Others Works loading trucks    Intrinsic Gratification Active PTA   Subjective   Subjective "Is it normal I can't feel you touching my back?"   ADL   Where Assessed Edge of bed   Eating Assistance 7  Independent   Grooming Assistance 5  Supervision/Setup   UB Bathing Assistance 4  Minimal Assistance   LB Bathing Assistance 3  Moderate Assistance   [address] 5  Supervision/Setup   LB Dressing Assistance 3  Moderate [address]  4  Minimal Assistance   Bed Mobility   Supine to Sit 5  Supervision   Additional items Increased time required;Verbal cues;LE management   Sit to Supine 3  Moderate assistance   Additional items Assist x 1; Increased time required;Verbal cues;LE management   Additional Comments Able to sit EOB with S assist  After OT session pt in bed with all needs within reach  Transfers   Sit to Stand 3  Moderate assistance   Additional items Assist x 1; Increased time required   Stand to Sit 3  Moderate assistance   Additional items Assist x 1; Increased time required;Verbal cues   Functional Mobility   Additional Comments Pt deferring mobility at this time 2* pain      Balance   Static Sitting Fair -   Dynamic Sitting Poor +   Static Standing Poor +   Dynamic Standing Poor   Activity Tolerance   Activity Tolerance Patient limited by fatigue;Patient limited by pain   Medical Staff Made Aware Seen with PT 2* medical complexity/ instability   Nurse Made Aware RN confirmed okay to see pt   RUE Assessment   RUE Assessment   (decerased AROM, pt reports that he has frozen shoulder)   LUE Assessment   LUE Assessment   (AROM to 90 degrees, pt reports he recently received cortisone shot )   Hand Function   Gross Motor Coordination Functional   Fine Motor Coordination Functional Sensation   Light Touch   (Pt reports numbness in B/L hands)   Vision - Complex Assessment   Additional Comments Pt reports that he was having double vision that has resolved at this time    Cognition   Overall Cognitive Status [facility]   Arousal/Participation Alert; Cooperative   Attention Within functional limits   Orientation Level Oriented X4   Memory Within functional limits   Following Commands Follows one step commands without difficulty   Comments Pt is pleasant and cooperative to work with therapy  Assessment   Limitation Decreased ADL status; Decreased endurance;Decreased self-care trans;Decreased high-level ADLs   Prognosis Good   Assessment Pt is a 27 y o  male admitted to [facility] on 4/15/2022 w/ acute midline low back pain and urinary incontinence, MRI negative  Pt  has a past medical history of Chronic back pain  Pt with active OT orders and ambulate  orders  Pt resides in a 2 story home with 2 [facility] and no [facility]  Pt lives with his significant other and young children  Pt was I w/  ADLS and IADLS, (+) drove, & required no use of DME PTA  Currently pt is mod A for functional transfers and functional mobility, min A for UB ADLS and mod A for LB ADLS  Pt is limited at this time 2*: pain, endurance, activity tolerance, functional mobility, forward functional reach, functional standing tolerance and decreased I w/ ADLS/IADLS  The following Occupational Performance Areas to address include: grooming, bathing/shower, toilet hygiene, dressing, functional mobility and clothing management  Based on the aforementioned OT evaluation, functional performance deficits, and assessments, pt has been identified as a high complexity evaluation  From OT standpoint, anticipate d/c STR pending progress  Pt to continue to benefit from acute immediate OT services to address the following goals 3-5x/week to  w/in 10-14 days: Alisha Sequin    Goals   Patient Goals To have less pain    LTG Time Frame 10-14   Long Term Goal #1 See goals below Plan   Treatment Interventions ADL retraining;Functional transfer training;UE strengthening/ROM; Endurance training;Patient/family training;Equipment evaluation/education; Compensatory technique education;Continued evaluation; Energy conservation; Activityengagement   Goal Expiration Date 04/30/22   OT Frequency 3-5x/wk   Recommendation   OT Discharge Recommendation Post acute rehabilitation services  (pending progress)   OT - OK to Discharge Yes  (When medically appropriate)   AM-PAC Daily Activity Inpatient   Lower Body Dressing 2   Bathing 2   Toileting 2   Upper Body Dressing 3   Grooming 4   Eating 4   Daily Activity Raw Score 17   Daily Activity Standardized Score (Calc for Raw Score >=11) 37 26   AM-PAC Applied Cognition Inpatient   Following a Speech/Presentation 4   Understanding Ordinary Conversation 4   Taking Medications 4   Remembering Where Things Are Placed or Put Away 4   Remembering List of 4-5 Errands 4   Taking Care of Complicated Tasks 4   Applied Cognition Raw Score 24   Applied Cognition Standardized Score 62 21   Modified Dereje Scale   Modified Glasscock Scale 4       GOALS    1) Pt will increase activity tolerance to G for 30 min txment sessions    2) Pt will complete UB/LB dressing/self care w/ mod I using adaptive device and DME as needed    3) Pt will complete bathing w/ Mod I w/ use of AE and DME as needed    4) Pt will complete toileting w/ mod I w/ G hygiene/thoroughness using DME as needed    5) Pt will improve functional transfers to Mod I on/off all surfaces using DME as needed w/ G balance/safety     6) Pt will improve functional mobility during ADL/IADL/leisure tasks to Mod I using DME as needed w/ G balance/safety     7) Pt will demonstrate G carryover of pt/caregiver education and training as appropriate      8) Pt will demonstrate 100% carryover of energy conservation techniques t/o functional I/ADL/leisure tasks w/o cues s/p skilled education    9) Pt will independently identify and utilize 2-3 coping strategies to increase positive affect and promote overall well-being      10) Pt will engage in ongoing cognitive assessment w/ G participation to assist w/ safe d/c planning/recommendations (as needed)    ANTONIO Wisdom, OTR/L

## 2022-04-16 NOTE — ANESTHESIA PREPROCEDURE EVALUATION
Procedure:  MRI THORACIC SPINE WO CONTRAST    For urgent MRI, concern for cauda equina or developing neurologic condition    Relevant Problems   MUSCULOSKELETAL   (+) Back pain        Physical Exam    Airway    Mallampati score: II  TM Distance: >3 FB  Neck ROM: full     Dental   No notable dental hx     Cardiovascular  Rhythm: regular, Rate: normal, Cardiovascular exam normal    Pulmonary  Pulmonary exam normal Breath sounds clear to auscultation,     Other Findings  Multiple missing teeth      Anesthesia Plan  ASA Score- 2 Emergent    Anesthesia Type- general with ASA Monitors  Additional Monitors:   Airway Plan: LMA  Comment: Risks/benefits and alternatives discussed with patient including likely possibility of PONV and sore throat, as well as the rare possibilities of aspiration, dental/oropharyngeal/ocular injuries, or grave/life threatening anesthetic and surgical emergencies          Plan Factors-Exercise tolerance (METS): >4 METS  Patient summary reviewed  Patient instructed to abstain from smoking on day of procedure  Patient did not smoke on day of surgery  Induction- intravenous  Postoperative Plan- Plan for postoperative opioid use  Planned trial extubation    Informed Consent- Anesthetic plan and risks discussed with patient  I personally reviewed this patient with the CRNA  Discussed and agreed on the Anesthesia Plan with the CRNA  Dwayne Muller

## 2022-04-16 NOTE — PROGRESS NOTES
Responded to Control Team issued for agitated patient  Made offer of support to patient's nurse  Nurse was doing okay and did not require support        04/16/22 1800   Clinical Encounter Type   Visited With Health care provider

## 2022-04-16 NOTE — ANESTHESIA POSTPROCEDURE EVALUATION
Post-Op Assessment Note    CV Status:  Stable  Pain Score: 0    Pain management: adequate     Mental Status:  Awake and sleepy   Hydration Status:  Stable   PONV Controlled:  None   Airway Patency:  Patent      Post Op Vitals Reviewed: Yes      Staff: Anesthesiologist, CRNA         No complications documented  /56 (04/15/22 2134)    Temp 98 3 °F (36 8 °C) (04/15/22 2134)    Pulse 77 (04/15/22 2134)   Resp 18 (04/15/22 2134)    SpO2 96 % (04/15/22 2134)      On transfer from [facility] pt became combative in elevator, trying to get out of bed  Radiology tech and crna prevented pt from getting out of bed  pts IV did get removed, a new iv placed to in pacu

## 2022-04-16 NOTE — PLAN OF CARE
Problem: PHYSICAL THERAPY ADULT  Goal: Performs mobility at highest level of function for planned discharge setting  See evaluation for individualized goals  Description: Treatment/Interventions: Functional transfer training,LE strengthening/ROM,Elevations,Therapeutic exercise,Endurance training,Equipment eval/education,Bed mobility,Gait training,Spoke to nursing,Spoke to advanced practitioner,OT,Family  Equipment Recommended:  (TBD; r/o rw next visit)       See flowsheet documentation for full assessment, interventions and recommendations  Note: Prognosis: Good  Problem List: Decreased strength,Decreased endurance,Impaired balance,Decreased mobility,Pain  Assessment: Pt is 27 y o  male with 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, presented w/ acute onset urinary incontinence, decreased genital and rectal sensation, and b/l LE weakness in the setting of worsening lumbo-sacral back pain and Dx of acute midline low back pain, concussion without loss of consciousness  Pt 's comorbidities affecting POC include: Chronic back pain w/ hx of L4-L5 discectomy, (R) shld sx and personal factors of: working FT, steps in the house and ANDI w/o hand rail  Pt's clinical presentation is currently unstable/unpredictable which is evident in ongoing back pain and guarding, additional consults pending (neurology) and inability to progress further w/ mobilization/amb at this time due to limited tolerance to standing  Pt presents w/ (L) > (R) LE weakness, decreased endurance and activity tolerance, impaired balance, bed mob and transfers difficulty and inability to amb at this time  Will cont to follow pt in PT for progressive mobilization to address above functional deficits and to max level of (I), endurance, and safety  Currently, based on overall mobility status, recommend rehab upon D/C  Will cont to follow until then    Barriers to Discharge: Inaccessible home environment        PT Discharge Recommendation: Post acute rehabilitation services          See flowsheet documentation for full assessment

## 2022-04-16 NOTE — PHYSICAL THERAPY NOTE
Physical Therapy Evaluation     Patient's Name: Rayna López    Admitting Diagnosis  Bladder incontinence [R32]  Back pain [M54 9]  Cauda equina syndrome ([address] ) [G83 4]  Weakness of left lower extremity [R29 898]    Problem List  Patient Active Problem List   Diagnosis    Acute midline low back pain    Back pain    Urinary incontinence    Concussion without loss of consciousness       Past Medical History  Past Medical History:   Diagnosis Date    Chronic back pain        Past Surgical History  Past Surgical History:   Procedure Laterality Date    BACK SURGERY  2019    lumbar    ORTHOPEDIC SURGERY      SHOULDER SURGERY Right     2019          04/16/22 0938   PT Last Visit   PT Visit Date 04/16/22   Note Type   Note type Evaluation   Pain Assessment   Pain Assessment Tool FLACC   Pain Location/Orientation Orientation: Lower; Location: Back   Pain Radiating Towards (L) LE   Pain Onset/Description Onset: Ongoing;Frequency: Intermittent; Descriptor: Aching;Descriptor: Discomfort   Effect of Pain on Daily Activities guarding w/ positional changes   Patient's Stated Pain Goal No pain   Hospital Pain Intervention(s) Repositioned; Ambulation/increased activity; Emotional support   Pain Rating: FLACC (Rest) - Face 0   Pain Rating: FLACC (Rest) - Legs 0   Pain Rating: FLACC (Rest) - Activity 0   Pain Rating: FLACC (Rest) - Cry 0   Pain Rating: FLACC (Rest) - Consolability 0   Score: FLACC (Rest) 0   Pain Rating: FLACC (Activity) - Face 1   Pain Rating: FLACC (Activity) - Legs 0   Pain Rating: FLACC (Activity) - Activity 0   Pain Rating: FLACC (Activity) - Cry 1   Pain Rating: FLACC (Activity) - Consolability 1   Score: FLACC (Activity) 3   Restrictions/Precautions   Braces or Orthoses   (denies)   Other Precautions Multiple lines;Pain;Spinal precautions   Home Living   Type of Home House   Home Layout Two level  (10 steps up w/ hand rail; 2 ANDI w/o hand rail)   Prior Function   Level of [place] Independent with ADLs and functional mobility  (amb w/o AD)   Lives With Spouse; Family   Vocational Full time employment   General   Additional Pertinent History cleared for assessment (spoke to nsg)   Family/Caregiver Present Yes   Cognition   Overall Cognitive Status WFL   Arousal/Participation Alert   Orientation Level Oriented to person;Oriented to place;Oriented to situation   Memory Within functional limits   Following Commands Follows one step commands without difficulty   Subjective   Subjective Alert; in bed and on the (R) side; reports his (R) shd starting hurting; agreeable to try mobilization   RUE Assessment   RUE Assessment WFL  (AROM)   LUE Assessment   LUE Assessment WFL  (AROM)   RLE Assessment   RLE Assessment WFL  (AROM)   Strength RLE   RLE Overall Strength   (good - (grossly))   LLE Assessment   LLE Assessment X  (decreased AROM at the hip)   Strength LLE   LLE Overall Strength   (fair - hip; fair knee; fair + ankle (grossly))   Light Touch   LLE Light Touch   (reports some numbness (L) gr toe and mid lower back)   Bed Mobility   Rolling R 5  Supervision   Additional items Verbal cues   Supine to Sit 5  Supervision   Additional items Assist x 1; Increased time required;Verbal cues   Sit to Supine 3  Moderate assistance   Additional items Assist x 1; Increased time required;Verbal cues;LE management   Additional Comments Positioned w/ b/in supine and (R) sidelying at the end of session w/ wedges for trunk support and pillow b/in LE   Transfers   Sit to Stand 3  Moderate assistance   Additional items Assist x 1; Increased time required;Verbal cues   Stand to Sit 3  Moderate assistance   Additional items Assist x 1; Increased time required;Verbal cues   Ambulation/Elevation   Gait pattern Not appropriate; Not tested  (decreased tolerance to upright standing)   Assistive Device   (side by side w/ UE support; r/o rw next visit)   Balance   Static Sitting Fair -  ((L) lumbar paraspinal tightness, TTP and spasm)   Dynamic Sitting Poor +   Static Standing Poor +   Dynamic Standing Poor   Activity Tolerance   Activity Tolerance Patient limited by fatigue;Patient limited by pain   Medical Staff Made Aware spoke to Yimi Dos Santos w/ neuro sx; Co-eval performed w/ OTR due to complexity of medical status   Nurse Made Aware spoke to RN   Assessment   Prognosis Good   Problem List Decreased strength;Decreased endurance; Impaired balance;Decreased mobility;Pain   Assessment Pt is 27 y o  male with 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, presented w/ acute onset urinary incontinence, decreased genital and rectal sensation, and b/l LE weakness in the setting of worsening lumbo-sacral back pain and Dx of acute midline low back pain, concussion without loss of consciousness  Pt 's comorbidities affecting POC include: Chronic back pain w/ hx of L4-L5 discectomy, (R) shld sx and personal factors of: working FT, steps in the house and ANDI w/o hand rail  Pt's clinical presentation is currently unstable/unpredictable which is evident in ongoing back pain and guarding, additional consults pending (neurology) and inability to progress further w/ mobilization/amb at this time due to limited tolerance to standing  Pt presents w/ (L) > (R) LE weakness, decreased endurance and activity tolerance, impaired balance, bed mob and transfers difficulty and inability to amb at this time  Will cont to follow pt in PT for progressive mobilization to address above functional deficits and to max level of (I), endurance, and safety  Currently, based on overall mobility status, recommend rehab upon D/C  Will cont to follow until then  Barriers to Discharge Inaccessible home environment   Goals   Patient Goals to feel better   STG Expiration Date 04/26/22   Short Term Goal #1 7-10 days  Pt will amb 2 x 50 ft w/ rw <--> SPC, mod (I) in order to facilitate safe return to premorbid environment and to initiate return to at least household amb status  Address elevations when clinically appropriate (PT to see the pt)  Pt will achieve (I) level w/ bed mob in order to facilitate safety with OOB and back to bed transitions in own living environment  Pt will perform transfers w/ mod (I) to assure (I) and safety w/ functional mobility/transitions w/ all aspects of mobility/locomotion  Pt will participate in LE therex and balance activities to max progression w/ mobility skills  PT Treatment Day 0   Plan   Treatment/Interventions Functional transfer training;LE strengthening/ROM; Elevations; Therapeutic exercise; Endurance training;Equipment eval/education; Bed mobility;Gait training;Spoke to nursing;Spoke to advanced practitioner;OT;Family   PT Frequency 3-5x/wk   Recommendation   PT Discharge Recommendation Post acute rehabilitation services   Equipment Recommended   (TBD; r/o rw next visit)   [facility] [address] Mobility Inpatient   Turning in Bed Without Bedrails 3   Lying on Back to Sitting on Edge of Flat Bed 3   Moving Bed to Chair 2   Standing Up From Chair 2   Walk in Room 1   Climb 3-5 Stairs 1   Basic Mobility Inpatient Raw Score 12   Basic Mobility Standardized Score 32 23   Highest Level Of Mobility   JH-HL Goal 4: Move to chair/commode   JH-HLM Highest Level of Mobility 3: Sit at edge of bed   JH-HLM Goal Achieved No   Modified Ida Scale   Modified Ida Scale 4   End of Consult   Patient Position at End of Consult Supine; All needs within reach         [facility], PT

## 2022-04-16 NOTE — PLAN OF CARE
Problem: MOBILITY - ADULT  Goal: Maintain or return to baseline ADL function  Description: INTERVENTIONS:  -  Assess patient's ability to carry out ADLs; assess patient's baseline for ADL function and identify physical deficits which impact ability to perform ADLs (bathing, care of mouth/teeth, toileting, grooming, dressing, etc )  - Assess/evaluate cause of self-care deficits   - Assess range of motion  - Assess patient's mobility; develop plan if impaired  - Assess patient's need for assistive devices and provide as appropriate  - Encourage maximum independence but intervene and supervise when necessary  - Involve family in performance of ADLs  - Assess for home care needs following discharge   - Consider OT consult to assist with ADL evaluation and planning for discharge  - Provide patient education as appropriate  Outcome: Progressing  Goal: Maintains/Returns to pre admission functional level  Description: INTERVENTIONS:  - Perform BMAT or MOVE assessment daily    - Set and communicate daily mobility goal to care team and patient/family/caregiver     - Collaborate with rehabilitation services on mobility goals if consulted  - Out of bed for toileting  - Record patient progress and toleration of activity level   Outcome: Progressing     Problem: Potential for Falls  Goal: Patient will remain free of falls  Description: INTERVENTIONS:  -  Assess patient's ability to carry out ADLs; assess patient's baseline for ADL function and identify physical deficits which impact ability to perform ADLs (bathing, care of mouth/teeth, toileting, grooming, dressing, etc )  - Assess/evaluate cause of self-care deficits   - Assess range of motion  - Assess patient's mobility; develop plan if impaired  - Assess patient's need for assistive devices and provide as appropriate  - Encourage maximum independence but intervene and supervise when necessary  - Involve family in performance of ADLs  - Assess for home care needs following discharge   - Consider OT consult to assist with ADL evaluation and planning for discharge  - Provide patient education as appropriate  Outcome: Progressing     Problem: Nutrition/Hydration-ADULT  Goal: Nutrient/Hydration intake appropriate for improving, restoring or maintaining nutritional needs  Description: Monitor and assess patient's nutrition/hydration status for malnutrition  Collaborate with interdisciplinary team and initiate plan and interventions as ordered  Monitor patient's weight and dietary intake as ordered or per policy  Utilize nutrition screening tool and intervene as necessary  Determine patient's food preferences and provide high-protein, high-caloric foods as appropriate       INTERVENTIONS:  - Monitor oral intake, urinary output, labs, and treatment plans  - Assess nutrition and hydration status and recommend course of action  - Evaluate amount of meals eaten  - Assist patient with eating if necessary   - Allow adequate time for meals  - Recommend/ encourage appropriate diets, oral nutritional supplements, and vitamin/mineral supplements  - Order, calculate, and assess calorie counts as needed  - Recommend, monitor, and adjust tube feedings and TPN/PPN based on assessed needs  - Assess need for intravenous fluids  - Provide specific nutrition/hydration education as appropriate  - Include patient/family/caregiver in decisions related to nutrition  Outcome: Progressing     Problem: PAIN - ADULT  Goal: Verbalizes/displays adequate comfort level or baseline comfort level  Description: Interventions:  - Encourage patient to monitor pain and request assistance  - Assess pain using appropriate pain scale  - Administer analgesics based on type and severity of pain and evaluate response  - Implement non-pharmacological measures as appropriate and evaluate response  - Consider cultural and social influences on pain and pain management  - Notify physician/advanced practitioner if interventions unsuccessful or patient reports new pain  Outcome: Progressing     Problem: INFECTION - ADULT  Goal: Absence or prevention of progression during hospitalization  Description: INTERVENTIONS:  - Assess and monitor for signs and symptoms of infection  - Monitor lab/diagnostic results  - Monitor all insertion sites, i e  indwelling lines, tubes, and drains  - Monitor endotracheal if appropriate and nasal secretions for changes in amount and color  - [place] appropriate cooling/warming therapies per order  - Administer medications as ordered  - Instruct and encourage patient and family to use good hand hygiene technique  - Identify and instruct in appropriate isolation precautions for identified infection/condition  Outcome: Progressing  Goal: Absence of fever/infection during neutropenic period  Description: INTERVENTIONS:  - Monitor WBC    Outcome: Progressing     Problem: SAFETY ADULT  Goal: Maintain or return to baseline ADL function  Description: INTERVENTIONS:  -  Assess patient's ability to carry out ADLs; assess patient's baseline for ADL function and identify physical deficits which impact ability to perform ADLs (bathing, care of mouth/teeth, toileting, grooming, dressing, etc )  - Assess/evaluate cause of self-care deficits   - Assess range of motion  - Assess patient's mobility; develop plan if impaired  - Assess patient's need for assistive devices and provide as appropriate  - Encourage maximum independence but intervene and supervise when necessary  - Involve family in performance of ADLs  - Assess for home care needs following discharge   - Consider OT consult to assist with ADL evaluation and planning for discharge  - Provide patient education as appropriate  Outcome: Progressing  Goal: Maintains/Returns to pre admission functional level  Description: INTERVENTIONS:  - Perform BMAT or MOVE assessment daily    - Set and communicate daily mobility goal to care team and patient/family/caregiver     - Collaborate with rehabilitation services on mobility goals if consulted  - Out of bed for toileting  - Record patient progress and toleration of activity level   Outcome: Progressing  Goal: Patient will remain free of falls  Description: INTERVENTIONS:  -  Assess patient's ability to carry out ADLs; assess patient's baseline for ADL function and identify physical deficits which impact ability to perform ADLs (bathing, care of mouth/teeth, toileting, grooming, dressing, etc )  - Assess/evaluate cause of self-care deficits   - Assess range of motion  - Assess patient's mobility; develop plan if impaired  - Assess patient's need for assistive devices and provide as appropriate  - Encourage maximum independence but intervene and supervise when necessary  - Involve family in performance of ADLs  - Assess for home care needs following discharge   - Consider OT consult to assist with ADL evaluation and planning for discharge  - Provide patient education as appropriate  Outcome: Progressing     Problem: DISCHARGE PLANNING  Goal: Discharge to home or other facility with appropriate resources  Description: INTERVENTIONS:  - Identify barriers to discharge w/patient and caregiver  - Arrange for needed discharge resources and transportation as appropriate  - Identify discharge learning needs (meds, wound care, etc )  - Arrange for interpretive services to assist at discharge as needed  - Refer to Case Management Department for coordinating discharge planning if the patient needs post-hospital services based on physician/advanced practitioner order or complex needs related to functional status, cognitive ability, or social support system  Outcome: Progressing     Problem: Knowledge Deficit  Goal: Patient/family/caregiver demonstrates understanding of disease process, treatment plan, medications, and discharge instructions  Description: Complete learning assessment and assess knowledge base    Interventions:  - Provide teaching at level of understanding  - Provide teaching via preferred learning methods  Outcome: Progressing

## 2022-04-16 NOTE — PROGRESS NOTES
Patient and family requesting information about treatment plan  This RN reached out to Neurosurgery and Neurology in order to obtain information  This RN relayed the information given to her from Julius Emmanuel and Dennis Lewis to the family and patient  The patient then became increasingly agitated with this information and began yelling profanity loudly about how he is not receiving enough care/treatment for his pain  This RN then stepped out of the room and had control team called because patient had an aggressive encounter with other staff members the evening before  Security and supervisor showed up and attempted to help deescalate the situation  At no point did any staff members step into the patients room  Patients mother then steps out of the room and begins to raise her voice/yell at staff members saying she does not feel safe and is calling 911 in order to be safely escorted out of the building  Soon after SOD resident arrived bedside and discussed with patient and family about the risks of leaving against medical advice  The patient refused to sign AMA forms  This RN removed patients IV access   Patient and family escorted out of hospital by [facility] police per their request

## 2022-04-16 NOTE — ASSESSMENT & PLAN NOTE
Patient is a 27year old man with concussion 3/10/2022 and L4-L5 discectomy 2019 who presented on admission on 4/15 due to acute onset urinary incontinence, decreased genital and rectal sensation and bilateral lower extremity after bilateral leg weakness, numbness of the genital and rectal areas, and urinary incontinence when he stood up during his orthopedics appointment but before his received his cortisone shots on right shoulder  Patient has had concussion on 3/10/2022 after which patient has had headaches (ongoing), bilateral shooting leg pain which has now resolved  Rectal, genital sensation and urinary incontinence is improving according to the patient but back/anal pain, shoulder pain are worsening  MRI c-spine, MRI t-spine, and MRI l-spine did not show findings significant enough to cause patient's symptoms and has ruled out cauda equina syndrome  Physical exam showed strength is pain limited but when overcome, patient symmetric 5/5 strength bilaterally upper and lower extremities  Sensory exam showed nonlocalizable sensory deficits with overall pinprick sensation loss with occasional patches of normal sensation and proprioception loss of left foot compared to right foot and decreased vibration loss of left>right lower extremity  On rectal exam, patient had intact anal wink reflex  MRI cervical spine wo contrast    Result Date: 4/15/2022  Impression: Unremarkable MRI of the cervical spine  Workstation performed: [id number]     MRI thoracic spine wo contrast    Result Date: 4/16/2022  Impression: Unremarkable MRI of the thoracic spine  Workstation performed: [id number]     MRI lumbar spine w wo contrast    Result Date: 4/15/2022  Impression: Stable exam   No abnormal leptomeningeal or cauda equina enhancement  Workstation performed: [id number]     MRI follow up neuro    Result Date: 4/15/2022  Impression: Incomplete examination    Patient could not tolerate the examination  -No significant canal stenosis or high-grade neural foraminal narrowing on this abbreviated limited exam using sagittal T2 sequence  -Normal signal of the cervical spinal cord on sagittal T2 sequence  The study was marked in [facility] for immediate notification  Workstation performed: [id number]     Impression: Patient's strength is bilaterally pain limited (but 5/5 bilaterally) with nonlocalizable numbness which seems to correspond with patient's overall pain  Most likely numbness (including genital and rectal numbness) and pain limited weakness are due to patient's amplified pain and not likely due to any known central or peripheral neurologic causes of patient's pain  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

## 2022-04-16 NOTE — QUICK NOTE
[facility] resident requested bedside by nurse  Per nurse, patient became increasingly agitated throughout the evening threatening to leave against medical advice stating that the hospital staff was not doing anything to help him  Hospital security was called due to concern for safety of the staff on the floor  Mother of patient called [phone] for which DEPARTMENT [facility] police presented bedside  We discussed with the patient the risk of leaving against medical advice including, but not limited to, the possibility for re-injury or new injury which could lead to irreversible damage  Patient declined to sign AMA forms  He was escorted out of the hospital by DEPARTMENT [facility] police

## 2022-04-16 NOTE — PLAN OF CARE
Problem: OCCUPATIONAL THERAPY ADULT  Goal: Performs self-care activities at highest level of function for planned discharge setting  See evaluation for individualized goals  Description: Treatment Interventions: ADL retraining,Functional transfer training,UE strengthening/ROM,Endurance training,Patient/family training,Equipment evaluation/education,Compensatory technique education,Continued evaluation,Energy conservation,Activityengagement          See flowsheet documentation for full assessment, interventions and recommendations  Note: Limitation: Decreased ADL status,Decreased endurance,Decreased self-care trans,Decreased high-level ADLs  Prognosis: Good  Assessment: Pt is a 27 y o  male admitted to [facility] on 4/15/2022 w/ acute midline low back pain and urinary incontinence, MRI negative  Pt  has a past medical history of Chronic back pain  Pt with active OT orders and ambulate  orders  Pt resides in a 2 story home with 2 [facility] and no [facility]  Pt lives with his significant other and young children  Pt was I w/  ADLS and IADLS, (+) drove, & required no use of DME PTA  Currently pt is mod A for functional transfers and functional mobility, min A for UB ADLS and mod A for LB ADLS  Pt is limited at this time 2*: pain, endurance, activity tolerance, functional mobility, forward functional reach, functional standing tolerance and decreased I w/ ADLS/IADLS  The following Occupational Performance Areas to address include: grooming, bathing/shower, toilet hygiene, dressing, functional mobility and clothing management  Based on the aforementioned OT evaluation, functional performance deficits, and assessments, pt has been identified as a high complexity evaluation  From OT standpoint, anticipate d/c STR pending progress  Pt to continue to benefit from acute immediate OT services to address the following goals 3-5x/week to  w/in 10-14 days:        OT Discharge Recommendation: Post acute rehabilitation services (pending progress)  OT - OK to Discharge: Yes (When medically appropriate)

## 2022-04-16 NOTE — TREATMENT PLAN
Imaging:  · MRI cervical spine wo contrast 4/15/2022: Unremarkable MRI of the cervical spine  · MRI thoracic spine wo contrast 4/15/2022: Unremarkable MRI of the thoracic spine  · MRI lumbar spine w wo contrast 4/15/2022:  Stable exam   No abnormal leptomeningeal or cauda equina enhancement      Plan:  · Imaging reviewed, grossly unremarkable, nothing compressive to account for patient's current symptoms  · No surgery is warranted at this time  · Continue with medical management, multimodal pain regimen, can transition patient to medrol dose pack, scheduled tylenol, muscle relaxers, topical agents, PRN narcotics which will be weaned as tolerated  · Continue working with therapy  · Consider steroid injections in the outpatient setting  · Consider neurology consult if warranted  · Neurosurgery signing off, follow up on an as needed basis  · Plan of care discussed with primary team via tiger text

## 2022-04-18 NOTE — DISCHARGE SUMMARY
INTERNAL MEDICINE RESIDENCY DISCHARGE SUMMARY     Delaney Medley   27 y o  male  MRN: [id number]  Room/Bed: [place] [address]/[facility] 621-01     [address] [place] [address]    Encounter: [id number]    Principal Problem:    Acute midline low back pain  Active Problems:    Back pain    Urinary incontinence    Concussion without loss of consciousness      Concussion without loss of consciousness  Assessment & Plan  2/2 traumatic work injury  Continues to suffer from daily HA, light sensitivity    Plan:  -pain control per other plans (scheduled tylenol, PRN oxy)  -neurology consulted for likely concussive symptoms  -cont steroids  -toradol, tylenol  -one time 2g IV mag + benadryl    Urinary incontinence  Assessment & Plan  As part of suspected cauda equina syndrome  No concern for infectious etiology  Patient cannot feel sensation of full bladder, initiate urination, or feel urination  UA negative for signs of infection    Plan:  No imaging findings to explain incontinence  Neurology consulted for assistance  Urinary retention protocol in place  Maintain condom cath    Back pain  Assessment & Plan  Midline tenderness present in lumbar spine, no step-off    Likely sciatica/acute spasm at this point  Acute on chronic 2/2 fall from work injury  Continue steroids and pain regimen      * Acute midline low back pain  Assessment & Plan  Patient presenting with acute onset bladder incontinence, obstipation, constipation, decreased inner thigh and genital sensation, and inability to achieve erection x2 days in setting of worsening lumbar back pain  Plan:  -suspect most likely sciatica given MRI of entire spine was without evidence of nerve compression  -treat for acute back pain with methylprednisolone taper, gabapentin, robaxin, toradol, tylenol, lidoderm patch   -q shift neuro checks  -neurology consulted to weigh in on urinary incontinence  Question need for EMG?  Patient has outpatient appointment on 4/22 to see them regarding his headaches  -d/c IV pain control  PRN oxy in place  -neurosurgery signed off  -maintain condom cath and monitor for signs of acute retention  -PT/OT ok to work with patient now given negative MRI          [address]     H&P per Dr Kera Mueller: "26 yo male with prior L4-L5 discectomy with full return to baseline, on no home medications, is presenting to [facility] ED with CC of acute onset urinary incontinence, decreased genital and rectal sensation, and b/l LE weakness in the setting of worsening lumbo-sacral back pain  One month ago the patient had a work injury 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  Heavy boxes and supplies fell, striking him in the head, causing him to fall  Denies LOC  He was evaluated in the ED with imaging immediately after injury and there was no acute intracranial pathology or bony fracture  After the strike and fall, patient did experience immediate headache, blurred vision, delayed cognition and memory, and diffuse back and neck pain  In the past month, he has been suffering from S&S of concussion including continued light sensitivity, daily HA on awakening, and eye fatigue  He has been taking only tylenol and motrin  He has an upcoming appointment with neurology for ongoing HA/concussive symptoms  He also suffered a right shoulder injury, for which he was seeing orthopedics in the outpatient office yesterday when he went to stand up and suddenly experienced lower extremity weakness with increased low back pain, inability to ambulate, as well as loss of urine  He could not feel himself urinating  He then presented to [facility] ED yesterday after that acute event  They recommended transfer to [facility] for MRI, however patient left AMA because he was "soaked in urine" and wanted to clean up  Patient states he got home by being wheeled out of hospital in wheelchair, and then his brother carried him around from there   He presented to [facility] ED this am because he continues to have urinary incontinence, and woke up soaked in urine  He also admits to inability to achieve erection recently, as well as decreased "funny" sensation between b/l thighs and over genitals and rectum  He has not had a BM in a few days and states he is not passing gas  "    Evening of 4/16 patient became increasingly agitated and, along with his mother, became verbally aggressive towards hospital staff  He was unsatisfied with his medical care requesting increasing pain medications despite being tapered off of IV medications and being made aware by the primary team earlier that day during rounds  His workup in the hospital including MRI of the whole spine was unrevealing and inconsistent with his medical complaints  The patient's mother called 46 for patient escort out of the hospital, unclear reason why she called however  Patient was made aware of the risks leaving against medical advice including, but not limited to, possibility of re-injury or new injury leading to irreversible damage  He declined to sign AMA forms and was escorted out of the hospital via wheelchair by [facility] police  DISCHARGE INFORMATION     PCP at Discharge: N/a    Admitting Provider: Windsor Kocher, MD  Admission Date: 4/15/2022    Discharge Provider: Shannon Cummings MD  Discharge Date: 4/16/2022    Discharge Disposition: Left against medical advice or discontinued care  Discharge Condition: stable  Discharge with Lines: no    Discharge Diet: regular diet  Activity Restrictions: no lifting more than 10 pounds  Test Results Pending at Discharge: N/a    Discharge Diagnoses:  Principal Problem:    Acute midline low back pain  Active Problems:    Back pain    Urinary incontinence    Concussion without loss of consciousness  Resolved Problems:    * No resolved hospital problems   *      Consulting Providers:      Diagnostic & Therapeutic Procedures Performed:  MRI cervical spine wo contrast    Result Date: 4/15/2022  Impression: Unremarkable MRI of the cervical spine  Workstation performed: [id number]     MRI thoracic spine wo contrast    Result Date: 4/16/2022  Impression: Unremarkable MRI of the thoracic spine  Workstation performed: [id number]     MRI lumbar spine w wo contrast    Result Date: 4/15/2022  Impression: Stable exam   No abnormal leptomeningeal or cauda equina enhancement  Workstation performed: [id number]     MRI follow up neuro    Result Date: 4/15/2022  Impression: Incomplete examination  Patient could not tolerate the examination  -No significant canal stenosis or high-grade neural foraminal narrowing on this abbreviated limited exam using sagittal T2 sequence  -Normal signal of the cervical spinal cord on sagittal T2 sequence  The study was marked in [facility] for immediate notification  Workstation performed: [id number]       Code Status: Prior  Advance Directive & Living Will: <no information>  Power of :    POLST:      Medications:  Discharge Medication List as of 4/16/2022  6:54 PM        Discharge Medication List as of 4/16/2022  6:54 PM        Discharge Medication List as of 4/16/2022  6:54 PM      CONTINUE these medications which have NOT CHANGED    Details   aspirin-acetaminophen-caffeine (EXCEDRIN MIGRAINE) 250-250-65 MG per tablet Take 1 tablet by mouth every 6 (six) hours as needed for headaches, Historical Med      Ibuprofen (MOTRIN PO) Take by mouth, Historical Med      naproxen (NAPROSYN) 375 mg tablet Take 1 tablet (375 mg total) by mouth 2 (two) times a day with meals for 20 doses, Starting Fri 3/11/2022, Until Thu 4/14/2022, Normal             Allergies: Allergies   Allergen Reactions    Morphine Hives       FOLLOW-UP     PCP Outpatient Follow-up:  1-2 weeks of discharge    Consulting Providers Follow-up:  Neurology     Active Issues Requiring Follow-up:   Back pain, urinary incontinence    Discharge Statement:   I spent 30 minutes minutes discharging the patient   This time was spent on the day of discharge  I had direct contact with the patient on the day of discharge  Additional documentation is required if more than 30 minutes were spent on discharge  Portions of the record may have been created with voice recognition software  Occasional wrong word or "sound a like" substitutions may have occurred due to the inherent limitations of voice recognition software    Read the chart carefully and recognize, using context, where substitutions have occurred     ==  Shyla Miller MD  [address]  Internal Medicine Resident PGY-1

## 2022-04-18 NOTE — PROGRESS NOTES
Pain Medicine Follow-Up Note    Assessment:  1  Chronic pain syndrome    2  Chronic left-sided low back pain with left-sided sciatica    3  Intervertebral disc disorder with radiculopathy of lumbosacral region    4  Lumbar post-laminectomy syndrome        Plan:  Orders Placed This Encounter   Procedures    FL spine and pain procedure     4 week follow-up after injection     Standing Status:   Future     Standing Expiration Date:   4/19/2026     Order Specific Question:   Reason for Exam:     Answer:   Left L5 and S1 transforaminal epidural steroid injection     Order Specific Question:   Anticoagulant hold needed? Answer:   unknown    Ambulatory referral to Physical Therapy     Standing Status:   Future     Standing Expiration Date:   4/19/2023     Referral Priority:   Routine     Referral Type:   Physical Therapy     Referral Reason:   Specialty Services Required     Requested Specialty:   Physical Therapy     Number of Visits Requested:   1     Expiration Date:   4/19/2023       New Medications Ordered This Visit   Medications    diazepam (VALIUM) 5 mg tablet     Sig: Take 5 mg by mouth    methylPREDNISolone 4 MG tablet therapy pack     Sig: Take as directed on package  Indications: Poison ivy    naproxen (NAPROSYN) 500 mg tablet     Sig: Take 500 mg by mouth 2 (two) times a day with meals       My impressions and treatment recommendations were discussed in detail with the patient who verbalized understanding and had no further questions  The patient is here today to review the MRI scans that were ordered previously  Since he underwent those MRI scans, he has been in the emergency department several times and even had a hospital admission  He has had another MRI of the cervical, thoracic, and lumbar spines    The MRI of the cervical and thoracic spines are completely normal   The MRI of the lumbar spine shows the left L4 laminotomy defect as well as a L5-S1 disc bulge and herniation that is likely contributing to his symptoms  At this point, I felt a reasonable to offer the patient a left L5 and S1 transforaminal epidural steroid injection  The procedures, its risks, and benefits were explained in detail to the patient  Risks include but are not limited to bleeding, infection, hematoma formation, abscess formation, weakness, headache, failure the pain to improve, nerve irritation or damage, and potential worsening of the pain  The patient verbalized understanding and wished to proceed with the procedure  I did feel reasonable to have the patient undergo a course of physical therapy 2-3 times per week for 4-6 weeks  Follow-up is planned in 4 weeks time or sooner as warranted  Discharge instructions were provided  I personally saw and examined the patient and I agree with the above discussed plan of care  History of Present Illness:    Leni Barkley is a 27 y o  male who presents to [facility] for interval re-evaluation of the above stated pain complaints  The patient has a past medical and chronic pain history as outlined in the assessment section  He was last seen on March 23, 2022  At today's office visit, the patient's pain score is 10/10 on the verbal numerical pain rating scale  The patient states that his symptoms are primarily in the low back with radiation into the left lower extremity in what appears to be the left S1 distribution  He describes his pain as worse in the morning and night  His pain is constant in nature  He reports the quality of his pain as sharp, throbbing, cramping, pressure-like, shooting, numbness, and pins and needles  He is reporting significant symptoms in his low back and left lower extremity  He also is interested in physical therapy as well as interventional pain procedures to help alleviate his pain at this time      Other than as stated above, the patient denies any interval changes in medications, medical condition, mental condition, symptoms, or allergies since the last office visit  Review of Systems:    Review of Systems   Respiratory: Positive for shortness of breath  Cardiovascular: Positive for chest pain  Gastrointestinal: Positive for nausea and vomiting  Negative for constipation and diarrhea  Musculoskeletal: Positive for gait problem and myalgias  Negative for arthralgias and joint swelling  Decreased ROM  Joint Stiffness     Skin: Negative for rash  Neurological: Positive for dizziness  Negative for seizures and weakness  All other systems reviewed and are negative          Patient Active Problem List   Diagnosis    Acute midline low back pain    Back pain    Urinary incontinence    Concussion without loss of consciousness    Chronic pain syndrome    Chronic left-sided low back pain with left-sided sciatica    Intervertebral disc disorder with radiculopathy of lumbosacral region    Lumbar post-laminectomy syndrome       Past Medical History:   Diagnosis Date    Chronic back pain        Past Surgical History:   Procedure Laterality Date    BACK SURGERY  2019    lumbar    DISCECTOMY SPINE LUMBAR W/ ARTHROPLASTY  02/2019    L5-S1    ORTHOPEDIC SURGERY      SHOULDER SURGERY Right     2019       Family History   Problem Relation Age of Onset    No Known Problems Mother     No Known Problems Father        Social History     Occupational History    Not on file   Tobacco Use    Smoking status: Current Every Day Smoker    Smokeless tobacco: Never Used   Vaping Use    Vaping Use: Never used   Substance and Sexual Activity    Alcohol use: Never    Drug use: Never    Sexual activity: Not on file         Current Outpatient Medications:     aspirin-acetaminophen-caffeine (EXCEDRIN MIGRAINE) 250-250-65 MG per tablet, Take 1 tablet by mouth every 6 (six) hours as needed for headaches, Disp: , Rfl:     diazepam (VALIUM) 5 mg tablet, Take 5 mg by mouth, Disp: , Rfl:     Ibuprofen (MOTRIN PO), Take by mouth, Disp: , Rfl:     methylPREDNISolone 4 MG tablet therapy pack, Take as directed on package  Indications: Poison ivy, Disp: , Rfl:     naproxen (NAPROSYN) 500 mg tablet, Take 500 mg by mouth 2 (two) times a day with meals, Disp: , Rfl:     naproxen (NAPROSYN) 375 mg tablet, Take 1 tablet (375 mg total) by mouth 2 (two) times a day with meals for 20 doses, Disp: 20 tablet, Rfl: 0    Allergies   Allergen Reactions    Morphine Hives       Physical Exam:    /90   Pulse 96   Resp 18   Ht 6' (1 829 m)   Wt 67 1 kg (148 lb)   BMI 20 07 kg/m²     Constitutional:normal, well developed, well nourished, alert, in no distress and non-toxic and no overt pain behavior  Eyes:anicteric  HEENT:grossly intact  Neck:supple, symmetric, trachea midline and no masses   Pulmonary:even and unlabored  Cardiovascular:No edema or pitting edema present  Skin:Normal without rashes or lesions and well hydrated  Psychiatric:Mood and affect appropriate  Neurologic:Cranial Nerves II-XII grossly intact  Musculoskeletal:antalgic      Imaging  FL spine and pain procedure    (Results Pending)   MRI THORACIC SPINE WITHOUT CONTRAST  04/07/2022    INDICATION: Acute midline thoracic back pain, right arm burning  COMPARISON: None  TECHNIQUE: Sagittal T1, sagittal T2, sagittal inversion recovery, axial T2, axial 2D MERGE  Imaging performed on 1 5T MRI   IMAGE QUALITY: Diagnostic  FINDINGS:    ALIGNMENT: Normal alignment of the thoracic spine  No compression fracture  No subluxation  No scoliosis  MARROW SIGNAL: Normal marrow signal is identified within the visualized bony structures  No discrete marrow lesion  THORACIC CORD: Normal signal within the thoracic cord  PARAVERTEBRAL SOFT TISSUES: Approximate 2 6 cm rounded cystic structure near the GE junction, 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  Alternatively, this may be arising from the liver       THORACIC DEGENERATIVE CHANGE: No disc herniation, canal stenosis or foraminal narrowing  No degenerative changes  IMPRESSION:    Unremarkable MRI of the thoracic spine aside from an incidental 2 6 cm cystic structure near the GE junction, 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  Recommend nonemergent dedicated CT imaging of the abdomen  MRI LUMBAR SPINE WITHOUT CONTRAST  04/15/2022    INDICATION: Left foot numbness  COMPARISON: 4/17/2022  TECHNIQUE: Sagittal T1, sagittal T2, sagittal inversion recovery, axial T1 and axial T2, coronal T2    IMAGE QUALITY: Diagnostic    FINDINGS:    VERTEBRAL BODIES: There are 5 lumbar type vertebral bodies  Mild straightening of lumbar lordosis, stable alignment  SACRUM: Unremarkable  DISTAL CORD AND CONUS: Normal signal morphology of the distal thoracic cord and conus, terminating at L1  PARASPINAL SOFT TISSUES: No mass or fluid collection  LOWER THORACIC DISC SPACES: Normal disc height and signal  No disc herniation, canal stenosis or foraminal narrowing  LUMBAR DISC SPACES:    L1-L2: No central canal stenosis or neural foraminal narrowing  L2-L3: No central canal stenosis or neural foraminal narrowing  L3-L4: No central canal stenosis or neural foraminal narrowing  L4-L5: Left hemilaminectomy  Stable mild posterior disc bulge  No central canal stenosis  Facet hypertrophy is stable  No neural foraminal narrowing  L5-S1: Mild paracentral annular tear and disc protrusion is stable  Mild facet hypertrophy  No central canal stenosis or neural foraminal narrowing  IMPRESSION:    Stable exam demonstrating disc degenerative change at L4-5 and L5-S1      MRI THORACIC SPINE WITHOUT CONTRAST  04/15/2022     INDICATION: trauma one month ago to the head, now with dullness to pinprick, perianal numbness, urinary incontinence, +left matos      COMPARISON:  3/10/2022 and 4/7/2022      TECHNIQUE:  Sagittal T1, sagittal T2, sagittal inversion recovery, axial T2,  axial 2D MERGE      IMAGE QUALITY: Diagnostic      FINDINGS:     ALIGNMENT: Normal alignment of the thoracic spine        MARROW SIGNAL:  No evidence of marrow edema or osseous lesion      THORACIC CORD: Normal signal and morphology of the thoracic cord and conus  No epidural collection or lesion      PARAVERTEBRAL SOFT TISSUES:  No mass or fluid collection      THORACIC DEGENERATIVE CHANGE: No disc herniation, central canal stenosis or neural foraminal narrowing      IMPRESSION:     Unremarkable MRI of the thoracic spine  MRI CERVICAL SPINE WITHOUT CONTRAST  04/15/2022     INDICATION: Trauma one month ago to the head, now with dullness to pinprick, perianal numbness, urinary incontinence, +left matos      COMPARISON:  3/10/2022      TECHNIQUE:  Sagittal T1, sagittal T2, sagittal inversion recovery, axial T2, axial  2D merge     IMAGE QUALITY:  Diagnostic     FINDINGS:     ALIGNMENT:  Normal alignment of the cervical spine        MARROW SIGNAL:  No evidence of marrow edema or osseous lesion      CERVICAL AND VISUALIZED THORACIC CORD:  Normal signal morphology of the cervical and visualized upper      PREVERTEBRAL AND PARASPINAL SOFT TISSUES:  Normal      VISUALIZED POSTERIOR FOSSA:  The visualized posterior fossa demonstrates no abnormal signal      CERVICAL DISC SPACES:     C2-C3:  Normal      C3-C4:  Normal      C4-C5:  Normal      C5-C6:  Normal      C6-C7:  Normal      C7-T1:  Normal      UPPER THORACIC DISC SPACES:  Normal      IMPRESSION:     Unremarkable MRI of the cervical spine      Orders Placed This Encounter   Procedures    FL spine and pain procedure    Ambulatory referral to Physical Therapy

## 2022-04-18 NOTE — ED ATTENDING ATTESTATION
4/15/2022  IAnna DO, saw and evaluated the patient  I have discussed the patient with the resident/non-physician practitioner and agree with the resident's/non-physician practitioner's findings, Plan of Care, and MDM as documented in the resident's/non-physician practitioner's note, except where noted  All available labs and Radiology studies were reviewed  I was present for key portions of any procedure(s) performed by the resident/non-physician practitioner and I was immediately available to provide assistance  At this point I agree with the current assessment done in the Emergency Department  I have conducted an independent evaluation of this patient a history and physical is as follows:      74-year-old male presents for back pain radiating down left leg  Had an accident at work approximately one month ago  He had an MRI of his thoracic and L-spine that was essentially normal but patient could not tolerate contrast phase therefore incomplete  He was seen last night and was supposed to be transferred but he left against medical advice  She reports urinary incontinence recently  No fevers or IV drug abuse on exam he does have decreased strength in the left in terms of dorsiflexion and plantar flexion some numbness 1st and 2nd toe left foot  He has decreased rectal tone    Plan to discuss with Radiology for MRI admission as well  ED Course         Critical Care Time  Procedures

## 2022-04-18 NOTE — UTILIZATION REVIEW
Initial Clinical Review    Admission: Date/Time/Statement:   Admission Orders (From admission, onward)     Ordered        04/15/22 1045  Inpatient Admission  Once                      Orders Placed This Encounter   Procedures    Inpatient Admission     Standing Status:   Standing     Number of Occurrences:   1     Order Specific Question:   Level of Care     Answer:   Med Surg [16]     Order Specific Question:   Estimated length of stay     Answer:   More than 2 Midnights     Order Specific Question:   Certification     Answer:   I certify that inpatient services are medically necessary for this patient for a duration of greater than two midnights  See H&P and MD Progress Notes for additional information about the patient's course of treatment  ED Arrival Information     Expected Arrival Acuity    - 4/15/2022 09:37 Emergent         Means of arrival Escorted by Service Admission type    [facility] Emergency         Arrival complaint    back pain        Chief Complaint   Patient presents with    Back Pain     Pt was injured at work a few weeks ago, has been following up with ortho, has been incontinent of urine,  yesterday had MRI, but was unable to tolerate it with contrast     Initial Presentation: 27 y o  male to ED presents with onset urinary incontinence, decreased genital and rectal sensation, and b/l LE weakness in the setting of worsening lumbo-sacral back pain  1 month ago pt had a work injury 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  Heavy boxes and supplies fell, striking him in the head, causing him to fall  He could not feel himself urinating  Presented to [facility] ED yesterday after that acute event  They recommended transfer to [facility] for MRI, however pt left AMA because he was "soaked in urine" and wanted to clean up  Returned today due to continues with urinary incontinence and woke up soaked in urine   He also admits to inability to achieve erection recently, as well as decreased "funny" sensation between b/l thighs and over genitals and rectum  He has not had a BM in a few days and states he is not passing gas  Pt also c/o endorsing LLE weakness  Has numbness on the top of his left foot  PMH for L4-L5 discectomy  Admit Inpatient level of care Suspected cauda equina syndrome, Urinary incontinence, Concussion without LOC and Back pain  Acute onset bladder incontinence, obstipation, constipation, decreased inner thigh and genital sensation, and inability to achieve erection x2 days in setting of worsening lumbar back pain  Neurosurgery consult  Decadron 10 mg IV now then 4 mg q6 hr after until spinal cord compression definitively ruled out  Pain regimen  Neurology consult  MRI results pending  Neuro checks q6h  Continues to suffer from daily HA, light sensitivity       4/15  Neurosurgery cons; Back pain  Continue to monitor neuro exam  Given Decadron 10mg Iv bolus, now on 4mg q6h      4/16  Progress notes; Treat for acute back pain with methylprednisolone taper, gabapentin, robaxin, toradol, tylenol, lidoderm patch  Neurology consult  Pain control  one time 2g IV mag + benadryl    Neurology cons; Pain control  No further inpatient neurology recommendations    Addt progress notes; Pt became increasingly agitated throughout the evening threatening to leave against medical advice stating that the hospital staff was not doing anything to help him  Hospital security was called due to concern for safety of the staff on the floor  Mother of patient called 45 for which [facility] police presented bedside  Discussed with the patient the risk of leaving against medical advice including, but not limited to, the possibility for re-injury or new injury which could lead to irreversible damage  Patient declined to sign AMA forms  He was escorted out of the hospital by [facility] police       ED Triage Vitals   Temperature Pulse Respirations Blood Pressure SpO2   04/15/22 0953 04/15/22 4561 04/15/22 0953 04/15/22 0953 04/15/22 0953   98 6 °F (37 °C) 84 18 139/88 99 %      Temp Source Heart Rate Source Patient Position - Orthostatic VS BP Location FiO2 (%)   04/15/22 0953 04/15/22 1518 -- 04/15/22 0953 --   Tympanic Apical  Left arm       Pain Score       04/15/22 0953       10 - Worst Possible Pain          Wt Readings from Last 1 Encounters:   04/15/22 69 4 kg (153 lb)     Additional Vital Signs:   04/16/22 14:10:32 98 4 °F (36 9 °C) 76 -- 119/73 88 98 % -- --   04/16/22 0810 -- -- -- -- -- 97 % None (Room air) --   04/16/22 07:55:58 98 1 °F (36 7 °C) 67 18 121/57 78 99 % -- --   04/16/22 0111 -- 65 -- 113/63 80 94 %       04/15/22 22:05:50 97 7 °F (36 5 °C) 71 -- 97/60 72 93 % -- --   04/15/22 2200 -- 112 Abnormal  -- -- -- 96 % -- --   04/15/22 2155 -- -- -- -- -- -- -- X   04/15/22 2145 -- 80 18 -- -- 96 % None (Room air) --   04/15/22 2134 98 3 °F (36 8 °C) 77 18 105/56 -- 96 % None (Room air)      Pertinent Labs/Diagnostic Test Results:   MRI lumbar spine w wo contrast   Final Result by Sunny Adams MD (04/15 2257)      Stable exam   No abnormal leptomeningeal or cauda equina enhancement  Workstation performed: [id number]         MRI thoracic spine wo contrast   Final Result by Sunny Adams MD (04/16 0051)      Unremarkable MRI of the thoracic spine  Workstation performed: [id number]         MRI cervical spine wo contrast   Final Result by Sunny Adams MD (04/15 2254)      Unremarkable MRI of the cervical spine  Workstation performed: [id number]         MRI follow up neuro   Final Result by Wilmer Rodriguez MD (37/56 1912)      Incomplete examination  Patient could not tolerate the examination    -No significant canal stenosis or high-grade neural foraminal narrowing on this abbreviated limited exam using sagittal T2 sequence    -Normal signal of the cervical spinal cord on sagittal T2 sequence        The study was marked in [facility] for immediate notification        Workstation performed: [id number]               Lab Units 04/16/22  0544 04/15/22  1030   WBC Thousand/uL 14 52* 12 07*   HEMOGLOBIN g/dL 13 0 16 3   HEMATOCRIT % 39 9 48 1   PLATELETS Thousands/uL 168 210   NEUTROS ABS Thousands/µL  --  10 37*         Lab Units 04/16/22  0544 04/15/22  1030   SODIUM mmol/L 136 136   POTASSIUM mmol/L 4 2 4 2   CHLORIDE mmol/L 108 108   CO2 mmol/L 20* 27   ANION GAP mmol/L 8 1*   BUN mg/dL 10 13   CREATININE mg/dL 0 57* 1 04   EGFR ml/min/1 73sq m 135 94   CALCIUM mg/dL 8 1* 9 7             Lab Units 04/16/22  0544 04/15/22  1030   GLUCOSE RANDOM mg/dL 104 116       Results from last 7 days   Lab Units 04/16/22  0544   HEP B S AG  Non-reactive   HEP C AB  Non-reactive       Results from last 7 days   Lab Units 04/15/22  0013   CLARITY UA  Slightly Cloudy*   COLOR UA  Yellow   SPEC GRAV UA  1 020   PH UA  7 5   GLUCOSE UA mg/dl Negative   KETONES UA mg/dl 15 (1+)*   BLOOD UA  Negative   PROTEIN UA mg/dl Negative   NITRITE UA  Negative   BILIRUBIN UA  Negative   UROBILINOGEN UA E U /dl 0 2   LEUKOCYTES UA  Negative       ED Treatment:   Medication Administration from 04/15/2022 5146 to 04/15/2022 1508       Date/Time Order Dose Route Action     04/15/2022 1032 ondansetron (ZOFRAN) injection 4 mg 4 mg Intravenous Given     04/15/2022 1038 HYDROmorphone (DILAUDID) injection 0 5 mg 0 5 mg Intravenous Given     04/15/2022 1359 diazepam (VALIUM) injection 5 mg 5 mg Intravenous Given     04/15/2022 1215 acetaminophen (TYLENOL) tablet 650 mg 650 mg Oral Given     04/15/2022 1209 dexamethasone (PF) (DECADRON) injection 10 mg 10 mg Intravenous Given        Past Medical History:   Diagnosis Date    Chronic back pain      Present on Admission:  **None**      Admitting Diagnosis: Bladder incontinence [R32]  Back pain [M54 9]  Cauda equina syndrome (HCC) [G83 4]  Weakness of left lower extremity [R29 898]  Age/Sex: 27 y o  male     Admission Orders:  Scheduled Medications:    Continuous IV Infusions:  lactated ringers infusion  Rate: 125 mL/hr Dose: 125 mL/hr  Freq: Continuous Route: IV  Indications of Use: IV Hydration  Last Dose: 125 mL/hr (04/15/22 2210)  Start: 04/15/22 2145 End: 04/16/22 0925      PRN Meds:  ketorolac (TORADOL) tablet 10 mg  Dose: 10 mg  Freq: Every 6 hours PRN Route: PO 4/16 x1  PRN Reason: mild pain  Start: 04/16/22 6764 End: 04/16/22 2054    methocarbamol (ROBAXIN) tablet 500 mg  Dose: 500 mg  Freq: Every 6 hours PRN Route: PO 4/16 x2  PRN Reason: muscle spasms  Start: 04/16/22 0698 End: 04/16/22 2054    oxyCODONE (ROXICODONE) IR tablet 5 mg  Dose: 5 mg  Freq: Every 4 hours PRN Route: PO 4/15 x2, 4/16 x3  PRN Reason: severe pain  Start: 04/15/22 1047 End: 04/16/22 2054      Neuro checks q6h  IP CONSULT TO NEUROSURGERY  IP CONSULT TO NEUROLOGY    Network Utilization Review Department  ATTENTION: Please call with any questions or concerns to [phone] and carefully listen to the prompts so that you are directed to the right person  All voicemails are confidential   Chrystine Can all requests for admission clinical reviews, approved or denied determinations and any other requests to dedicated fax number below belonging to the campus where the patient is receiving treatment   List of dedicated fax numbers for the Facilities:  [address] DENIALS (Administrative/Medical Necessity) [phone]   [address] (Maternity/NICU/Pediatrics) [phone]   [address] 40 125 [facility]  [address] [address] [place] [address] [address] [place] [address] [id number] [place] [address] [phone] [phone] [facility] 951 [phone]

## 2022-04-18 NOTE — H&P (VIEW-ONLY)
Pain Medicine Follow-Up Note    Assessment:  1  Chronic pain syndrome    2  Chronic left-sided low back pain with left-sided sciatica    3  Intervertebral disc disorder with radiculopathy of lumbosacral region    4  Lumbar post-laminectomy syndrome        Plan:  Orders Placed This Encounter   Procedures    FL spine and pain procedure     4 week follow-up after injection     Standing Status:   Future     Standing Expiration Date:   4/19/2026     Order Specific Question:   Reason for Exam:     Answer:   Left L5 and S1 transforaminal epidural steroid injection     Order Specific Question:   Anticoagulant hold needed? Answer:   unknown    Ambulatory referral to Physical Therapy     Standing Status:   Future     Standing Expiration Date:   4/19/2023     Referral Priority:   Routine     Referral Type:   Physical Therapy     Referral Reason:   Specialty Services Required     Requested Specialty:   Physical Therapy     Number of Visits Requested:   1     Expiration Date:   4/19/2023       New Medications Ordered This Visit   Medications    diazepam (VALIUM) 5 mg tablet     Sig: Take 5 mg by mouth    methylPREDNISolone 4 MG tablet therapy pack     Sig: Take as directed on package  Indications: Poison ivy    naproxen (NAPROSYN) 500 mg tablet     Sig: Take 500 mg by mouth 2 (two) times a day with meals       My impressions and treatment recommendations were discussed in detail with the patient who verbalized understanding and had no further questions  The patient is here today to review the MRI scans that were ordered previously  Since he underwent those MRI scans, he has been in the emergency department several times and even had a hospital admission  He has had another MRI of the cervical, thoracic, and lumbar spines    The MRI of the cervical and thoracic spines are completely normal   The MRI of the lumbar spine shows the left L4 laminotomy defect as well as a L5-S1 disc bulge and herniation that is likely contributing to his symptoms  At this point, I felt a reasonable to offer the patient a left L5 and S1 transforaminal epidural steroid injection  The procedures, its risks, and benefits were explained in detail to the patient  Risks include but are not limited to bleeding, infection, hematoma formation, abscess formation, weakness, headache, failure the pain to improve, nerve irritation or damage, and potential worsening of the pain  The patient verbalized understanding and wished to proceed with the procedure  I did feel reasonable to have the patient undergo a course of physical therapy 2-3 times per week for 4-6 weeks  Follow-up is planned in 4 weeks time or sooner as warranted  Discharge instructions were provided  I personally saw and examined the patient and I agree with the above discussed plan of care  History of Present Illness:    Alexander Ordonez is a 27 y o  male who presents to [facility] for interval re-evaluation of the above stated pain complaints  The patient has a past medical and chronic pain history as outlined in the assessment section  He was last seen on March 23, 2022  At today's office visit, the patient's pain score is 10/10 on the verbal numerical pain rating scale  The patient states that his symptoms are primarily in the low back with radiation into the left lower extremity in what appears to be the left S1 distribution  He describes his pain as worse in the morning and night  His pain is constant in nature  He reports the quality of his pain as sharp, throbbing, cramping, pressure-like, shooting, numbness, and pins and needles  He is reporting significant symptoms in his low back and left lower extremity  He also is interested in physical therapy as well as interventional pain procedures to help alleviate his pain at this time      Other than as stated above, the patient denies any interval changes in medications, medical condition, mental condition, symptoms, or allergies since the last office visit  Review of Systems:    Review of Systems   Respiratory: Positive for shortness of breath  Cardiovascular: Positive for chest pain  Gastrointestinal: Positive for nausea and vomiting  Negative for constipation and diarrhea  Musculoskeletal: Positive for gait problem and myalgias  Negative for arthralgias and joint swelling  Decreased ROM  Joint Stiffness     Skin: Negative for rash  Neurological: Positive for dizziness  Negative for seizures and weakness  All other systems reviewed and are negative          Patient Active Problem List   Diagnosis    Acute midline low back pain    Back pain    Urinary incontinence    Concussion without loss of consciousness    Chronic pain syndrome    Chronic left-sided low back pain with left-sided sciatica    Intervertebral disc disorder with radiculopathy of lumbosacral region    Lumbar post-laminectomy syndrome       Past Medical History:   Diagnosis Date    Chronic back pain        Past Surgical History:   Procedure Laterality Date    BACK SURGERY  2019    lumbar    DISCECTOMY SPINE LUMBAR W/ ARTHROPLASTY  02/2019    L5-S1    ORTHOPEDIC SURGERY      SHOULDER SURGERY Right     2019       Family History   Problem Relation Age of Onset    No Known Problems Mother     No Known Problems Father        Social History     Occupational History    Not on file   Tobacco Use    Smoking status: Current Every Day Smoker    Smokeless tobacco: Never Used   Vaping Use    Vaping Use: Never used   Substance and Sexual Activity    Alcohol use: Never    Drug use: Never    Sexual activity: Not on file         Current Outpatient Medications:     aspirin-acetaminophen-caffeine (EXCEDRIN MIGRAINE) 250-250-65 MG per tablet, Take 1 tablet by mouth every 6 (six) hours as needed for headaches, Disp: , Rfl:     diazepam (VALIUM) 5 mg tablet, Take 5 mg by mouth, Disp: , Rfl:     Ibuprofen (MOTRIN PO), Take by mouth, Disp: , Rfl:     methylPREDNISolone 4 MG tablet therapy pack, Take as directed on package  Indications: Poison ivy, Disp: , Rfl:     naproxen (NAPROSYN) 500 mg tablet, Take 500 mg by mouth 2 (two) times a day with meals, Disp: , Rfl:     naproxen (NAPROSYN) 375 mg tablet, Take 1 tablet (375 mg total) by mouth 2 (two) times a day with meals for 20 doses, Disp: 20 tablet, Rfl: 0    Allergies   Allergen Reactions    Morphine Hives       Physical Exam:    /90   Pulse 96   Resp 18   Ht 6' (1 829 m)   Wt 67 1 kg (148 lb)   BMI 20 07 kg/m²     Constitutional:normal, well developed, well nourished, alert, in no distress and non-toxic and no overt pain behavior  Eyes:anicteric  HEENT:grossly intact  Neck:supple, symmetric, trachea midline and no masses   Pulmonary:even and unlabored  Cardiovascular:No edema or pitting edema present  Skin:Normal without rashes or lesions and well hydrated  Psychiatric:Mood and affect appropriate  Neurologic:Cranial Nerves II-XII grossly intact  Musculoskeletal:antalgic      Imaging  FL spine and pain procedure    (Results Pending)   MRI THORACIC SPINE WITHOUT CONTRAST  04/07/2022    INDICATION: Acute midline thoracic back pain, right arm burning  COMPARISON: None  TECHNIQUE: Sagittal T1, sagittal T2, sagittal inversion recovery, axial T2, axial 2D MERGE  Imaging performed on 1 5T MRI   IMAGE QUALITY: Diagnostic  FINDINGS:    ALIGNMENT: Normal alignment of the thoracic spine  No compression fracture  No subluxation  No scoliosis  MARROW SIGNAL: Normal marrow signal is identified within the visualized bony structures  No discrete marrow lesion  THORACIC CORD: Normal signal within the thoracic cord  PARAVERTEBRAL SOFT TISSUES: Approximate 2 6 cm rounded cystic structure near the GE junction, 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  Alternatively, this may be arising from the liver       THORACIC DEGENERATIVE CHANGE: No disc herniation, canal stenosis or foraminal narrowing  No degenerative changes  IMPRESSION:    Unremarkable MRI of the thoracic spine aside from an incidental 2 6 cm cystic structure near the GE junction, 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  Recommend nonemergent dedicated CT imaging of the abdomen  MRI LUMBAR SPINE WITHOUT CONTRAST  04/15/2022    INDICATION: Left foot numbness  COMPARISON: 4/17/2022  TECHNIQUE: Sagittal T1, sagittal T2, sagittal inversion recovery, axial T1 and axial T2, coronal T2    IMAGE QUALITY: Diagnostic    FINDINGS:    VERTEBRAL BODIES: There are 5 lumbar type vertebral bodies  Mild straightening of lumbar lordosis, stable alignment  SACRUM: Unremarkable  DISTAL CORD AND CONUS: Normal signal morphology of the distal thoracic cord and conus, terminating at L1  PARASPINAL SOFT TISSUES: No mass or fluid collection  LOWER THORACIC DISC SPACES: Normal disc height and signal  No disc herniation, canal stenosis or foraminal narrowing  LUMBAR DISC SPACES:    L1-L2: No central canal stenosis or neural foraminal narrowing  L2-L3: No central canal stenosis or neural foraminal narrowing  L3-L4: No central canal stenosis or neural foraminal narrowing  L4-L5: Left hemilaminectomy  Stable mild posterior disc bulge  No central canal stenosis  Facet hypertrophy is stable  No neural foraminal narrowing  L5-S1: Mild paracentral annular tear and disc protrusion is stable  Mild facet hypertrophy  No central canal stenosis or neural foraminal narrowing  IMPRESSION:    Stable exam demonstrating disc degenerative change at L4-5 and L5-S1      MRI THORACIC SPINE WITHOUT CONTRAST  04/15/2022     INDICATION: trauma one month ago to the head, now with dullness to pinprick, perianal numbness, urinary incontinence, +left matos      COMPARISON:  3/10/2022 and 4/7/2022      TECHNIQUE:  Sagittal T1, sagittal T2, sagittal inversion recovery, axial T2,  axial 2D MERGE      IMAGE QUALITY: Diagnostic      FINDINGS:     ALIGNMENT: Normal alignment of the thoracic spine        MARROW SIGNAL:  No evidence of marrow edema or osseous lesion      THORACIC CORD: Normal signal and morphology of the thoracic cord and conus  No epidural collection or lesion      PARAVERTEBRAL SOFT TISSUES:  No mass or fluid collection      THORACIC DEGENERATIVE CHANGE: No disc herniation, central canal stenosis or neural foraminal narrowing      IMPRESSION:     Unremarkable MRI of the thoracic spine  MRI CERVICAL SPINE WITHOUT CONTRAST  04/15/2022     INDICATION: Trauma one month ago to the head, now with dullness to pinprick, perianal numbness, urinary incontinence, +left matos      COMPARISON:  3/10/2022      TECHNIQUE:  Sagittal T1, sagittal T2, sagittal inversion recovery, axial T2, axial  2D merge     IMAGE QUALITY:  Diagnostic     FINDINGS:     ALIGNMENT:  Normal alignment of the cervical spine        MARROW SIGNAL:  No evidence of marrow edema or osseous lesion      CERVICAL AND VISUALIZED THORACIC CORD:  Normal signal morphology of the cervical and visualized upper      PREVERTEBRAL AND PARASPINAL SOFT TISSUES:  Normal      VISUALIZED POSTERIOR FOSSA:  The visualized posterior fossa demonstrates no abnormal signal      CERVICAL DISC SPACES:     C2-C3:  Normal      C3-C4:  Normal      C4-C5:  Normal      C5-C6:  Normal      C6-C7:  Normal      C7-T1:  Normal      UPPER THORACIC DISC SPACES:  Normal      IMPRESSION:     Unremarkable MRI of the cervical spine      Orders Placed This Encounter   Procedures    FL spine and pain procedure    Ambulatory referral to Physical Therapy

## 2022-04-19 ENCOUNTER — OFFICE VISIT (OUTPATIENT)
Dept: PAIN MEDICINE | Facility: CLINIC | Age: 32
End: 2022-04-19
Payer: OTHER MISCELLANEOUS

## 2022-04-19 VITALS
HEART RATE: 96 BPM | HEIGHT: 72 IN | WEIGHT: 148 LBS | RESPIRATION RATE: 18 BRPM | BODY MASS INDEX: 20.05 KG/M2 | SYSTOLIC BLOOD PRESSURE: 125 MMHG | DIASTOLIC BLOOD PRESSURE: 90 MMHG

## 2022-04-19 DIAGNOSIS — M51.17 INTERVERTEBRAL DISC DISORDER WITH RADICULOPATHY OF LUMBOSACRAL REGION: ICD-10-CM

## 2022-04-19 DIAGNOSIS — M96.1 LUMBAR POST-LAMINECTOMY SYNDROME: ICD-10-CM

## 2022-04-19 DIAGNOSIS — G89.4 CHRONIC PAIN SYNDROME: Primary | ICD-10-CM

## 2022-04-19 DIAGNOSIS — G89.29 CHRONIC LEFT-SIDED LOW BACK PAIN WITH LEFT-SIDED SCIATICA: ICD-10-CM

## 2022-04-19 DIAGNOSIS — M54.42 CHRONIC LEFT-SIDED LOW BACK PAIN WITH LEFT-SIDED SCIATICA: ICD-10-CM

## 2022-04-19 PROCEDURE — 99214 OFFICE O/P EST MOD 30 MIN: CPT | Performed by: ANESTHESIOLOGY

## 2022-04-19 RX ORDER — DIAZEPAM 5 MG/1
5 TABLET ORAL
COMMUNITY
Start: 2022-04-16 | End: 2022-04-21

## 2022-04-19 RX ORDER — NAPROXEN 500 MG/1
500 TABLET ORAL 2 TIMES DAILY WITH MEALS
COMMUNITY
End: 2022-05-26

## 2022-04-19 RX ORDER — METHYLPREDNISOLONE 4 MG/1
TABLET ORAL
COMMUNITY
Start: 2022-04-16 | End: 2022-05-26

## 2022-04-21 NOTE — PROGRESS NOTES
[facility] Consult  PATIENT:  Nikolay Ignacio  MRN:  [id number]  :  1990  DATE OF SERVICE:  2022  REFERRED BY: No ref  provider found  PMD: No primary care provider on file  Assessment/Plan:     Nikolay Ignacio is a  pleasant  27 y o  male with a past medical history that includes Status post rotator cuff repair, chronic back pain s/p lumbar surgery referred here for evaluation of headache  My initial evaluation 2022    H/O concussion -resolved  Acute post-traumatic headache, not intractable  Post-traumatic stress  On 03/10/2022 he was working when he was hit multiple times by boxes and a Pallet at work and fell to the ground  He had typical acute concussion symptoms as well as posttraumatic headache with migrainous features as well as other injuries and laceration to the right eyebrow  He was evaluated emergency department where noncontrast head CT and CT C/T/L-spine were unremarkable for acute pathology  He subsequently has been following with Pain Management, orthopedics and been to the emergency department as well as admitted for neck, back and shoulder pain  We discussed I would defer to those doctors for management of those issues and they have kept him out of work  He was referred to me for posttraumatic headaches  - as of 2022 he reports posttraumatic headaches have improved somewhat after the 1st few weeks when he was having constant nausea and vomiting and now still having daily posttraumatic headaches that wax and wane in severity  Typically can be worse for 20 minutes at a time with neuralgia features for which we will start trial of gabapentin with the help it may help with multiple types of pain and sleep    He has symptoms of posttraumatic stress with constant intrusion symptoms regarding the incident, negative impact on cognition and mood, avoidance, marked alterations in arousal and reactivity associated with a traumatic event for which I recommended counseling and if persists psychiatry  Workup:  -noncontrast head CT 03/10/2022:  No acute intracranial abnormality    Preventative:  - we discussed headache hygiene and lifestyle factors that may improve headaches  -     gabapentin (Neurontin) 100 mg capsule; gabapentin 100 mg nightly for 3 nights then if needed/tolerated increase to 200 mg nightly for 3 nights, increase to 300 mg nightly and continue for 1-2 weeks then contact her doctor and we have plenty of room to increase further in the future  Discussed proper use, possible side effects and risks  - Currently on through other providers: none  - Past/ failed/contraindicated: none  - future options:  Venlafaxine, amitriptyline, CGRP med, botox     Abortive:  - discussed not taking over-the-counter or prescription pain medications more than 3 days per week to prevent medication overuse/rebound headache  - Currently on through other providers:  Naproxen  - Past/ failed/contraindicated:  Multiple medications in hospital he reports did not help, Valium, OTC meds, Medrol Dosepak  - future options:  Could consider Triptan, prochlorperazine, Toradol IM or p o , could consider trial for 5 days of Depakote or dexamethasone for prolonged migraine, ubrelvy, reyvow, nurtec    We have discussed concussions and the natural course of recovery  We have discussed that symptoms from a concussion typically take 2 weeks to resolve, and although sometimes it can feel like concussion symptoms linger on, at this point these symptoms would be related to contributing factors      - Contributing factors may include:   Post traumatic stress, Prolonged removal from normal routine,  posttraumatic headache,  comorbid injuries, anxiety or depression, stress, deconditioning,  comorbid medical diagnoses  - I have recommended gradual return of normal cognitive and physical activity with safety precautions, however defer to other doctors regarding his other injuries of back   - We discussed that newer research regarding concussion shows that the sooner one returns gradually to their normal physical and cognitive routine, the sooner one tends to recover  Prolonged removal from normal routine and deconditioning have been shown to prolong symptoms and worsen symptoms   - We discussed that sometimes there is a constellation of symptoms that some refer to as "post concussion syndrome," but I prefer not to use this term since that can be misleading and make people think they are still brain injured or "concussed," when the most common and likely etiology this far out from the head trauma is either contributing factors or a form of functional neurologic disorder with mixed symptoms  - We discussed how cognitive issues can have multiple causes and often related to multifactorial etiologies including stress, anxiety,  mood, pain, hypervigilance  and sleep issues and provided reassurance that, it is not likely the cognitive dysfunction is related to concussion at this point   - Safe driving precautions, should not drive at all if feeling sleepy or cognitively not well  * We discussed the most likely therapy to help in these cases would be counseling      Patient instructions        As far as work I would defer to his Occupational Medicine/worker's Comp team and spine team because if he only had symptoms related to what I am treating him for we discussed I would recommend gradual return to normalcy any certainly is not able to return due to his back pain  If mood symptoms do not continue to improve I recommend considering counseling     Headache/migraine treatment:   Abortive medications (for immediate treatment of a headache): It is ok to take ibuprofen, acetaminophen or naproxen (Advil, Tylenol,  Aleve, Excedrin) if they help your headaches you should limit these to No more than 3 times a week to avoid medication overuse/rebound headaches         Prescription preventive medications for headaches/migraines   (to take every day to help prevent headaches - not to take at the time of headache):  [x] gabapentin 100 mg nightly for 3 nights then if needed/tolerated increase to 200 mg nightly for 3 nights then if needed/tolerated increase to 300 mg nightly and continue for 1-2 weeks then contact her doctor and we have plenty of room to increase further in the future  *Typically these types of medications take time untill you see the benefit, although some may see improvement in days, often it may take weeks, especially if the medication is being titrated up to a beneficial level  Please contact us if there are any concerns or questions regarding the medication  Lifestyle Recommendations:  [x] SLEEP - Maintain a regular sleep schedule: Adults need at least 7-8 hours of uninterrupted a night  Maintain good sleep hygiene:  Going to bed and waking up at consistent times, avoiding excessive daytime naps, avoiding caffeinated beverages in the evening, avoid excessive stimulation in the evening and generally using bed primarily for sleeping  One hour before bedtime would recommend turning lights down lower, decreasing your activity (may read quietly, listen to music at a low volume)  When you get into bed, should eliminate all technology (no texting, emailing, playing with your phone, iPad or tablet in bed)  [x] HYDRATION - Maintain good hydration  Drink  2L of fluid a day (4 typical small water bottles)  [x] DIET - Maintain good nutrition  In particular don't skip meals and try and eat healthy balanced meals regularly  [x] TRIGGERS - Look for other triggers and avoid them: Limit caffeine to 1-2 cups a day or less  Avoid dietary triggers that you have noticed bring on your headaches (this could include aged cheese, peanuts, MSG, aspartame and nitrates)    [x] EXERCISE - physical exercise as we all know is good for you in many ways, and not only is good for your heart, but also is beneficial for your mental health, cognitive health and  chronic pain/headaches  I would encourage at the least 5 days of physical exercise weekly for at least 30 minutes  Education and Follow-up  [x] Please call with any questions or concerns  Of course if any new concerning symptoms go to the emergency department  [x] Follow up May 25 at 200     [employer] - Download this free [employer] on your phone (they will offer subscription, but you do not have to do this)  - I recommend listening to the first approximately 5 or so lectures (more if you want) that are about 10-20 minutes long on the neuroscience of pain  - They discuss how pain works in the brain and steps to try and cure chronic pain    I recommend these free lectures from [employer]  "The basic neuroscience of pain"  "Pain is more than just tissue damage"  "How pain becomes chronic"  "What does the pain mean to you"  "What to do next"        CC:   We had the pleasure of evaluating Delaney Medley in neurological consultation today  Delaney Medley is a   right handed male who presents today for evaluation of headaches  History obtained from patient as well as available medical record review  Mom is here with him    History of Present Illness:   Current medical illnesses  or past medical history include Status post rotator cuff repair 2022, chronic back pain s/p lumbar surgery    On 03/10/2022, he was working when he got knocked into a wooden Pallet at work, and multiple things on the Pallet fell on top of him causing him to fall over    was struck at the right lateral eyebrown, knocked out tooth  Acute symptoms included:  No known LOC, saw stars, dizziness, imbalance, posttraumatic headache with nausea, photophobia, left lateral neck and shoulder pain, thoracic mid spine pain, laceration to right eyebrow  He was evaluated emergency department where noncontrast head CT and CT C/T/L-spine were unremarkable for acute pathology    He subsequently has been following with Pain Management, orthopedics and been to the emergency department as well as admitted for neck back and shoulder pain    He was referred to me for history of possible concussion and posttraumatic headache    - as of 4/22/2022: daily headaches        Work  Out since this happened through other providers  Normal work hours 7-5, 5-6 days a week     How often do the headaches occur?   - minor headaches earlier in life   - as of 4/22/2022: chronic daily headaches gradually improved, was vomiting daily for first few weeks, now a little better   What time of the day do the headaches start? Worse in am  How long do the headaches last? 20 mins and then become duller     Aura? without aura     Last eye exam: [facility] Jan/Feb     Where is your headache located and pain quality?   - right parietal and temporal region sharp where he was hit, can be shooting for 20 mins at a time  - constant pinching   Can travel elsewhere   What is the intensity of pain? Average: 5/10, worst 10/10  Associated symptoms:   [x] Nausea       [] Vomiting  - not anymore  [x] Photophobia     [x]Phonophobia      [] Osmophobia  [x] Blurred vision   [x] Light-headed or dizzy     [x] Tinnitus  - both    [] Ptosis      [] Facial droop  [] Lacrimation  [] Nasal congestion/rhinorrhea      Things that make the headache worse? No specific movements, any movement with steps     Headache triggers:  Lights, getting up too fast     Have you seen someone else for headaches or pain? Yes, pain management   Have you ever had any Brain imaging? yes    What medications do you take or have you taken for your headaches?    ABORTIVE:      exedrin migraine - helped him sleep   zofran for nausea helped    past  OTC medications have been ineffective   Naproxen  Medrol dose pack  Decadron in hospital   Valium did not help    Opioids helped body pain     PREVENTIVE:   -       Past/ failed/contraindicated:  -       LIFESTYLE  Sleep   - averages: 4 hours   Problems falling asleep?:   Yes  Problems staying asleep?:  Yes    Water: not much per day  Caffeine: previously 2-3 times per day    Mood: "I feel depressed" Chiquis been away from kids for a week, staying with mom   Intrusion thoughts about the incident all day long  Agitated  Denies history of anxiety or depression or other diagnosed mood disorder    The following portions of the patient's history were reviewed and updated as appropriate: allergies, current medications, past family history, past medical history, past social history, past surgical history and problem list     Pertinent family history:  Family history of headaches:  migraine headaches in sister step   Any family history of aneurysms - No  Nothing neurologic    Pertinent social history:  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  Lives with wife kids - 10 in May, 2, other born in September       Past Medical History:     Past Medical History:   Diagnosis Date    Chronic back pain        Patient Active Problem List   Diagnosis    Acute midline low back pain    Back pain    Urinary incontinence    Concussion without loss of consciousness    Chronic pain syndrome    Chronic left-sided low back pain with left-sided sciatica    Intervertebral disc disorder with radiculopathy of lumbosacral region    Lumbar post-laminectomy syndrome       Medications:      Current Outpatient Medications   Medication Sig Dispense Refill    aspirin-acetaminophen-caffeine (EXCEDRIN MIGRAINE) 250-250-65 MG per tablet Take 1 tablet by mouth every 6 (six) hours as needed for headaches      Ibuprofen (MOTRIN PO) Take 2 tablets by mouth if needed        gabapentin (Neurontin) 100 mg capsule gabapentin 100 mg nightly for 3 nights then if needed/tolerated increase to 200 mg nightly for 3 nights, increase to 300 mg nightly and continue for 1-2 weeks then contact her doctor and we have plenty of room to increase further in the future   90 capsule 2    methylPREDNISolone 4 MG tablet therapy pack Take as directed on package  Indications: Poison ivy (Patient not taking: Reported on 4/22/2022)      naproxen (NAPROSYN) 375 mg tablet Take 1 tablet (375 mg total) by mouth 2 (two) times a day with meals for 20 doses (Patient not taking: Reported on 4/22/2022 ) 20 tablet 0    naproxen (NAPROSYN) 500 mg tablet Take 500 mg by mouth 2 (two) times a day with meals (Patient not taking: Reported on 4/22/2022 )       No current facility-administered medications for this visit  Allergies:       Allergies   Allergen Reactions    Morphine Hives       Family History:     Family History   Problem Relation Age of Onset    No Known Problems Mother     No Known Problems Father        Social History:       Social History     Socioeconomic History    Marital status: /Civil Union     Spouse name: Not on file    Number of children: Not on file    Years of education: Not on file    Highest education level: Not on file   Occupational History    Not on file   Tobacco Use    Smoking status: Current Every Day Smoker    Smokeless tobacco: Never Used   Vaping Use    Vaping Use: Never used   Substance and Sexual Activity    Alcohol use: Never    Drug use: Never    Sexual activity: Not on file   Other Topics Concern    Not on file   Social History Narrative    Not on file     Social Determinants of Health     Financial Resource Strain: Not on file   Food Insecurity: Not on file   Transportation Needs: Not on file   Physical Activity: Not on file   Stress: Not on file   Social Connections: Not on file   Intimate Partner Violence: Not on file   Housing Stability: Not on file         Objective:       Physical Exam:                                                                 Vitals:            Constitutional:    /94 (BP Location: Left arm, Patient Position: Sitting, Cuff Size: Standard)   Pulse 77   Ht 6' (1 829 m)   Wt 68 kg (150 lb)   BMI 20 34 kg/m²   BP Readings from Last 3 Encounters:   04/22/22 124/94   04/19/22 125/90   04/16/22 119/73     Pulse Readings from Last 3 Encounters:   04/22/22 77   04/19/22 96   04/16/22 76         Well developed, well nourished, well groomed  No dysmorphic features  Psychiatric:  Normal behavior and appropriate affect, depressed       Neurological Examination:     Mental status/cognitive function:    Recent and remote memory intact  Attention span and concentration as well as fund of knowledge are appropriate for age  Normal language and spontaneous speech  Cranial Nerves:  II-visual fields full  Fundi poorly visualized due to pupillary constriction  III, IV, VI-Pupils were equal, round, and reactive to light and accomodation  Extraocular movements were full and conjugate without nystagmus  V-facial sensation symmetric  VII-facial expression symmetric, intact forehead wrinkle, strong eye closure, symmetric smile    VIII-hearing grossly intact bilaterally   IX, X-palate elevation symmetric, no dysarthria  XI-shoulder shrug strength intact    XII-tongue protrusion midline  Motor Exam: symmetric bulk and tone throughout, no pronator drift  Power/strength 5/5 bilateral upper and lower extremities, no atrophy, fasciculations or abnormal movements noted  Generally able to give full effort for a moment but then diminished bilateral lower extremities greater than bilateral upper extremities due to pain  Sensory: grossly intact light touch in all extremities  Reflexes: brachioradialis 2+, biceps 2+, knee 2+, ankle 2+ bilaterally   No ankle clonus  Coordination: Finger nose finger intact bilaterally, no apparent dysmetria, ataxia or tremor noted  Gait:  Antalgic gait using cane    Pertinent lab results:   04/16/2022 BMP was CO2 20  CBC with WBC 14 2 while on steroids  HIV nonreactive  No LFTs in system     Imaging: I have personally reviewed imaging and radiology read   -noncontrast head CT 03/10/2022:  No acute intracranial abnormality    - MRI C-spine 04/15/2022: Unremarkable  -MRI T-spine 04/15/2022: Unremarkable  MRI L-spine 04/15/2022: Stable exam   No abnormal leptomeningeal or cauda equina enhancement  L4-L5:  Stable mild posterior disc bulge and annular tear  No central canal stenosis  Mild facet arthropathy  No neural foraminal narrowing  L5-S1:  Stable posterior disc bulge and annular tear  Superimposed paracentral disc protrusion is stable  No central canal stenosis or neural foraminal narrowing  Please see EMR for multiple imaging of the spine which is not the reason for this visit    Review of Systems:   ROS obtained by medical assistant Personally reviewed and updated if indicated  I recommended PCP follow up for non neurologic problems  Review of Systems   Constitutional: Negative  HENT: Negative  Eyes: Positive for photophobia  Respiratory: Negative  Cardiovascular: Negative  Gastrointestinal: Positive for nausea  Endocrine: Negative  Genitourinary: Negative  Musculoskeletal: Negative  Skin: Negative  Allergic/Immunologic: Negative  Neurological: Positive for dizziness and headaches  Hematological: Negative  Psychiatric/Behavioral: Negative  I have spent 62 minutes with Patient and mom today in which greater than 50% of this time was spent in counseling/coordination of care regarding Diagnostic results, Prognosis, Risks and benefits of tx options, Intructions for management, Patient and family education, Importance of tx compliance, Risk factor reductions and Impressions  I also spent 24 minutes non face to face for this patient the same day           Author:  Geovanny Pulido MD 4/22/2022 4:57 PM

## 2022-04-22 ENCOUNTER — CONSULT (OUTPATIENT)
Dept: NEUROLOGY | Facility: CLINIC | Age: 32
End: 2022-04-22
Payer: OTHER MISCELLANEOUS

## 2022-04-22 VITALS
BODY MASS INDEX: 20.32 KG/M2 | HEART RATE: 77 BPM | DIASTOLIC BLOOD PRESSURE: 94 MMHG | HEIGHT: 72 IN | SYSTOLIC BLOOD PRESSURE: 124 MMHG | WEIGHT: 150 LBS

## 2022-04-22 DIAGNOSIS — Z87.820 H/O CONCUSSION: Primary | ICD-10-CM

## 2022-04-22 DIAGNOSIS — G44.319 ACUTE POST-TRAUMATIC HEADACHE, NOT INTRACTABLE: ICD-10-CM

## 2022-04-22 DIAGNOSIS — F43.10 POST-TRAUMATIC STRESS: ICD-10-CM

## 2022-04-22 PROCEDURE — 99215 OFFICE O/P EST HI 40 MIN: CPT | Performed by: PSYCHIATRY & NEUROLOGY

## 2022-04-22 PROCEDURE — 99417 PROLNG OP E/M EACH 15 MIN: CPT | Performed by: PSYCHIATRY & NEUROLOGY

## 2022-04-22 RX ORDER — GABAPENTIN 100 MG/1
CAPSULE ORAL
Qty: 90 CAPSULE | Refills: 2 | Status: SHIPPED | OUTPATIENT
Start: 2022-04-22 | End: 2022-05-19 | Stop reason: SDUPTHER

## 2022-04-22 NOTE — PROGRESS NOTES
Review of Systems   Constitutional: Negative  HENT: Negative  Eyes: Positive for photophobia  Respiratory: Negative  Cardiovascular: Negative  Gastrointestinal: Positive for nausea  Endocrine: Negative  Genitourinary: Negative  Musculoskeletal: Negative  Skin: Negative  Allergic/Immunologic: Negative  Neurological: Positive for dizziness and headaches  Hematological: Negative  Psychiatric/Behavioral: Negative

## 2022-04-22 NOTE — PATIENT INSTRUCTIONS
As far as work I would defer to his Occupational Medicine/worker's Comp team and spine team because if he only had symptoms related to what I am treating him for we discussed I would recommend gradual return to normalcy any certainly is not able to return due to his back pain  If mood symptoms do not continue to improve I recommend considering counseling     Headache/migraine treatment:   Abortive medications (for immediate treatment of a headache): It is ok to take ibuprofen, acetaminophen or naproxen (Advil, Tylenol,  Aleve, Excedrin) if they help your headaches you should limit these to No more than 3 times a week to avoid medication overuse/rebound headaches  Prescription preventive medications for headaches/migraines   (to take every day to help prevent headaches - not to take at the time of headache):  [x] gabapentin 100 mg nightly for 3 nights then if needed/tolerated increase to 200 mg nightly for 3 nights then if needed/tolerated increase to 300 mg nightly and continue for 1-2 weeks then contact her doctor and we have plenty of room to increase further in the future  *Typically these types of medications take time untill you see the benefit, although some may see improvement in days, often it may take weeks, especially if the medication is being titrated up to a beneficial level  Please contact us if there are any concerns or questions regarding the medication  Lifestyle Recommendations:  [x] SLEEP - Maintain a regular sleep schedule: Adults need at least 7-8 hours of uninterrupted a night  Maintain good sleep hygiene:  Going to bed and waking up at consistent times, avoiding excessive daytime naps, avoiding caffeinated beverages in the evening, avoid excessive stimulation in the evening and generally using bed primarily for sleeping  One hour before bedtime would recommend turning lights down lower, decreasing your activity (may read quietly, listen to music at a low volume)   When you get into bed, should eliminate all technology (no texting, emailing, playing with your phone, iPad or tablet in bed)  [x] HYDRATION - Maintain good hydration  Drink  2L of fluid a day (4 typical small water bottles)  [x] DIET - Maintain good nutrition  In particular don't skip meals and try and eat healthy balanced meals regularly  [x] TRIGGERS - Look for other triggers and avoid them: Limit caffeine to 1-2 cups a day or less  Avoid dietary triggers that you have noticed bring on your headaches (this could include aged cheese, peanuts, MSG, aspartame and nitrates)  [x] EXERCISE - physical exercise as we all know is good for you in many ways, and not only is good for your heart, but also is beneficial for your mental health, cognitive health and  chronic pain/headaches  I would encourage at the least 5 days of physical exercise weekly for at least 30 minutes  Education and Follow-up  [x] Please call with any questions or concerns  Of course if any new concerning symptoms go to the emergency department    [x] Follow up May 25 at 200     [employer] - Download this free [employer] on your phone (they will offer subscription, but you do not have to do this)  - I recommend listening to the first approximately 5 or so lectures (more if you want) that are about 10-20 minutes long on the neuroscience of pain  - They discuss how pain works in the brain and steps to try and cure chronic pain    I recommend these free lectures from [employer]  "The basic neuroscience of pain"  "Pain is more than just tissue damage"  "How pain becomes chronic"  "What does the pain mean to you"  "What to do next"

## 2022-05-02 ENCOUNTER — OFFICE VISIT (OUTPATIENT)
Dept: OBGYN CLINIC | Facility: CLINIC | Age: 32
End: 2022-05-02
Payer: OTHER MISCELLANEOUS

## 2022-05-02 VITALS
SYSTOLIC BLOOD PRESSURE: 134 MMHG | WEIGHT: 154 LBS | HEIGHT: 72 IN | BODY MASS INDEX: 20.86 KG/M2 | HEART RATE: 64 BPM | DIASTOLIC BLOOD PRESSURE: 80 MMHG

## 2022-05-02 DIAGNOSIS — M51.17 INTERVERTEBRAL DISC DISORDER WITH RADICULOPATHY OF LUMBOSACRAL REGION: ICD-10-CM

## 2022-05-02 DIAGNOSIS — M54.42 ACUTE BILATERAL LOW BACK PAIN WITH LEFT-SIDED SCIATICA: Primary | ICD-10-CM

## 2022-05-02 PROCEDURE — 99214 OFFICE O/P EST MOD 30 MIN: CPT | Performed by: ORTHOPAEDIC SURGERY

## 2022-05-02 NOTE — PROGRESS NOTES
5[address] Kyle Sorenson MD  [address] [facility] [address] [phone]    HISTORY OF PRESENT ILLNESS:    The patient presents for initial evaluation of lumbar spine  He is here through [facility] and has seen Radha Marroquin most recently  He did injure himself 3/10/2022 at work with a pallet falling on him  He has had symptoms since including episodes of urinary incontinence  Today he complains of low back pain with left posterior thigh, anterior shin and dorsal foot pain and numbness  He rates his pain at 8/10 sitting and greater at times yet lying decreases symptoms to 3/10  Most activity aggravates while standing aggravates the most and lying alleviates  He does use gabapentin 300mg 3x/day with benefit  He denies recent physical therapy  He has plan for lumbar [place] with Dr Swift  He has history of lumbar L4-S1 discectomy/decompression surgery in [place] in 2/2019 with Wesly Gordon following a previous WC injury with resolution of leg symptoms at that time  ALLERGIES:   Allergies   Allergen Reactions    Morphine Hives       MEDICATIONS:    Current Outpatient Medications:     aspirin-acetaminophen-caffeine (EXCEDRIN MIGRAINE) 250-250-65 MG per tablet, Take 1 tablet by mouth every 6 (six) hours as needed for headaches, Disp: , Rfl:     gabapentin (Neurontin) 100 mg capsule, gabapentin 100 mg nightly for 3 nights then if needed/tolerated increase to 200 mg nightly for 3 nights, increase to 300 mg nightly and continue for 1-2 weeks then contact her doctor and we have plenty of room to increase further in the future , Disp: 90 capsule, Rfl: 2    Ibuprofen (MOTRIN PO), Take 2 tablets by mouth if needed  , Disp: , Rfl:     methylPREDNISolone 4 MG tablet therapy pack, Take as directed on package   Indications: Poison ivy, Disp: , Rfl:     naproxen (NAPROSYN) 500 mg tablet, Take 500 mg by mouth 2 (two) times a day with meals  , Disp: , Rfl:     naproxen (NAPROSYN) 375 mg tablet, Take 1 tablet (375 mg total) by mouth 2 (two) times a day with meals for 20 doses (Patient not taking: Reported on 4/22/2022 ), Disp: 20 tablet, Rfl: 0     PAST MEDICAL HISTORY:   Past Medical History:   Diagnosis Date    Chronic back pain        PAST SURGICAL HISTORY:  Past Surgical History:   Procedure Laterality Date    BACK SURGERY  2019    lumbar    DISCECTOMY SPINE LUMBAR W/ ARTHROPLASTY  02/2019    L5-S1    ORTHOPEDIC SURGERY      SHOULDER SURGERY Right     2019       SOCIAL HISTORY:  Social History     Tobacco Use   Smoking Status Current Every Day Smoker   Smokeless Tobacco Never Used        ROS:  Review of Systems   Musculoskeletal: Positive for back pain and gait problem  PHYSICAL EXAM:  Two posterior lumbar incisional scars  Reverse antalgic gait  Cane in left hand, patient able to walk without cane  5/5 strength with give away left dorsiflexion  Altered sensation anterior shin  symmetric Reflexes at L4 and S1   Patient able to flex and extend lumbar spine    RADIOGRAPHIC STUDIES:  1  Lumbar MRI of 4/14/2022:  Mild degenerative disc at L4-5 and L5-S1  Loss of disc height at L5-S1, mild  Prior right L4-5 and L5-S1 hemilaminotomy procedure  No central stenosis  Also lateral recess stenosis or nerve root compression  Chronic annular tears at L4-5 and L5-S1 consistent with prior surgery  No evidence of acute injury  2   Thoracic MRI of 4/15/2022: Unremarkable study  No evidence of acute injury  3   Cervical MRI 4/15/2022:  Mild disc desiccation  No evidence of acute injury  No spinal cord or nerve root compression  ASSESSMENT:  1  Acute bilateral low back pain with left-sided sciatica    2  Intervertebral disc disorder with radiculopathy of lumbosacral region  -     Ambulatory referral to Orthopedic Surgery        PLAN:  27-year-old gentleman presents complaining of back and left leg pain  Lumbar and left leg pain complaints    The patient has had previous WC injury with subsequent L4-S1 discectomy/decompression with resolution of leg pain complaints at that time  Most recently he has had WC injury on 3/10/2022 in which a pallet of boxes landed on his upper body and head  He has had multiple ED visits over the past two months for issues related to his injury  He displays no worrisome issues on exam yet does display abnormal pain signs along with limited effort in strength testing  He is able to walk with normal gait pattern without cane  The patient is explained that his imaging and exam do not correlate with current pain complaints and that surgery for these complaints is not recommended  He should continue care with Dr Thayer  On physical examination was significant abnormal findings  Including and reverse antalgic gait, positive without signs, and give-way involving the entirety of left lower extremity  Was no objective evidence of radiculopathy on physical examination  There was also no objective evidence of an acute injury on MRI studies         Scribe Attestation    I,:  Jesus Scruggs am acting as a scribe while in the presence of the attending physician :       I,:  Dora Banda MD personally performed the services described in this documentation    as scribed in my presence :

## 2022-05-12 ENCOUNTER — OFFICE VISIT (OUTPATIENT)
Dept: OBGYN CLINIC | Facility: OTHER | Age: 32
End: 2022-05-12
Payer: OTHER MISCELLANEOUS

## 2022-05-12 VITALS
SYSTOLIC BLOOD PRESSURE: 114 MMHG | BODY MASS INDEX: 20.18 KG/M2 | DIASTOLIC BLOOD PRESSURE: 79 MMHG | WEIGHT: 149 LBS | HEART RATE: 57 BPM | HEIGHT: 72 IN

## 2022-05-12 DIAGNOSIS — M24.811 INTERNAL DERANGEMENT OF RIGHT SHOULDER: Primary | ICD-10-CM

## 2022-05-12 DIAGNOSIS — M24.812 INTERNAL DERANGEMENT OF LEFT SHOULDER: ICD-10-CM

## 2022-05-12 PROCEDURE — 99204 OFFICE O/P NEW MOD 45 MIN: CPT | Performed by: ORTHOPAEDIC SURGERY

## 2022-05-12 NOTE — PROGRESS NOTES
Assessment  Diagnoses and all orders for this visit:    Internal derangement of right shoulder    Discussion and Plan:    · Explained to the patient that his physical exam and mechanism of injury at work on 3/14/2022 is worrisome for a possible labral tear  An MRI arthrogram of the right shoulder is warranted at this time due to the patients persistent pain/symptoms  This will be scheduled appropriately today  · He will continue current work restrictions of no lifting, pulling, pushing greater than 5 lbs with the right upper extremity  Note was provided  · He mentioned that he also has an MRI of his left shoulder on 5/16/2022 that is not an arthrogram so we will put in a new order for an MRI arthrogram of his left shoulder as he has similar symptoms from the same work injury  · Follow up after MRI arthrogram of the right shoulder, as this is the more painful shoulder, for discussion of results and further treatment options based on these results    Subjective:   Patient ID: Bayron Cuevas is a 27 y o  male    The patient presents with a chief complaint of bilateral, right worse than left shoulder pain  The pain began 2 month(s) ago and is associated with an acute injury  Patient reports that he was injured at work on 3/14/2022 after 50 lbs boxes fell on him  The patient describes the pain as aching and dull in intensity,  intermittent in timing  Patient notes improvement of his symptoms since initial injury but still has significant pain with any overhead or repetitive motions  He localizes the pain to the  bilateral, right greater than left subacromial joint, deltoid, scapulo-thoracic  The pain is worse with overhead work, overuse and raising arm over head and relieved by rest, ice, avoiding the painful activities  The pain is not associated with numbness and tingling  The pain is not associated with constitutional symptoms  The patient is awoken at night by the pain      The patient has had prior treatment in the form of home stretching program     He does note a prior surgical procedure to his right shoulder in 2019 in [place] but does not recall the specific procedure that was performed  The following portions of the patient's history were reviewed and updated as appropriate: allergies, current medications, past family history, past medical history, past social history, past surgical history and problem list     Objective:  /79   Pulse 57   Ht 6' (1 829 m)   Wt 67 6 kg (149 lb)   BMI 20 21 kg/m²       Right Shoulder Exam     Tenderness   The patient is experiencing no tenderness  Range of Motion   External rotation: normal   Forward flexion: normal   Internal rotation 0 degrees: Lumbar     Muscle Strength   Abduction: 5/5     Tests   Drop arm: negative    Other   Erythema: absent  Sensation: normal  Pulse: present            Physical Exam  Constitutional:       General: He is not in acute distress  Appearance: He is well-developed  Eyes:      Conjunctiva/sclera: Conjunctivae normal       Pupils: Pupils are equal, round, and reactive to light  Cardiovascular:      Rate and Rhythm: Normal rate and regular rhythm  Pulmonary:      Effort: Pulmonary effort is normal       Breath sounds: Normal breath sounds  Abdominal:      General: Bowel sounds are normal       Palpations: Abdomen is soft  Musculoskeletal:      Cervical back: Normal range of motion and neck supple  Skin:     General: Skin is warm and dry  Findings: No erythema or rash  Neurological:      Mental Status: He is alert and oriented to person, place, and time  Deep Tendon Reflexes: Reflexes are normal and symmetric  Psychiatric:         Behavior: Behavior normal        I have personally reviewed pertinent films in PACS and my interpretation is as follows  MRI Right Shoulder 3/30/2022: Mild subacromial bursitis  No evidence of internal derangement       Scribe Attestation    I,:  [facility] am acting as a scribe while in the presence of the attending physician :       I,:  Mary Anne Art MD personally performed the services described in this documentation    as scribed in my presence :

## 2022-05-12 NOTE — LETTER
May 12, 2022     Patient: Adrien Thompson  YOB: 1990  Date of Visit: 5/12/2022      To Whom it May Concern:    Adrien Thompson is under my professional care  Lelaellen García was seen in my office on 5/12/2022  Lela García may return to work with the following restrictions: no lifting, pushing or pulling more than 5 lbs with right upper extremity until next evaluation  If you have any questions or concerns, please don't hesitate to call           Sincerely,          Ayad Etienne MD        CC: No Recipients

## 2022-05-13 ENCOUNTER — TELEPHONE (OUTPATIENT)
Dept: OBGYN CLINIC | Facility: MEDICAL CENTER | Age: 32
End: 2022-05-13

## 2022-05-13 NOTE — TELEPHONE ENCOUNTER
We will not order with sedation  Sedation = anesthesia  We can provide a sedative only  If he can't lay that long for both, then he needs to make them on separate days

## 2022-05-13 NOTE — TELEPHONE ENCOUNTER
Patient sees Dr Hilton Knott  Patient calling about his MRI for both shoulders, he is stating he needs to the order to be updated with sedation  He states he cannot lay there that long with no sedation  Please let him know if possible? Please give patient a call back relating this    CB # [phone]

## 2022-05-18 ENCOUNTER — HOSPITAL ENCOUNTER (OUTPATIENT)
Dept: RADIOLOGY | Facility: CLINIC | Age: 32
Discharge: HOME | End: 2022-05-18
Attending: ANESTHESIOLOGY | Admitting: ANESTHESIOLOGY
Payer: OTHER MISCELLANEOUS

## 2022-05-18 VITALS
SYSTOLIC BLOOD PRESSURE: 124 MMHG | OXYGEN SATURATION: 98 % | DIASTOLIC BLOOD PRESSURE: 90 MMHG | RESPIRATION RATE: 20 BRPM | HEART RATE: 78 BPM | TEMPERATURE: 98.9 F

## 2022-05-18 DIAGNOSIS — G89.4 CHRONIC PAIN SYNDROME: ICD-10-CM

## 2022-05-18 DIAGNOSIS — M54.42 CHRONIC LEFT-SIDED LOW BACK PAIN WITH LEFT-SIDED SCIATICA: ICD-10-CM

## 2022-05-18 DIAGNOSIS — M96.1 LUMBAR POST-LAMINECTOMY SYNDROME: ICD-10-CM

## 2022-05-18 DIAGNOSIS — G89.29 CHRONIC LEFT-SIDED LOW BACK PAIN WITH LEFT-SIDED SCIATICA: ICD-10-CM

## 2022-05-18 DIAGNOSIS — M51.17 INTERVERTEBRAL DISC DISORDER WITH RADICULOPATHY OF LUMBOSACRAL REGION: ICD-10-CM

## 2022-05-18 PROCEDURE — A9585 GADOBUTROL INJECTION: HCPCS | Performed by: ANESTHESIOLOGY

## 2022-05-18 PROCEDURE — 64484 NJX AA&/STRD TFRM EPI L/S EA: CPT | Performed by: ANESTHESIOLOGY

## 2022-05-18 PROCEDURE — 64483 NJX AA&/STRD TFRM EPI L/S 1: CPT | Performed by: ANESTHESIOLOGY

## 2022-05-18 RX ORDER — LIDOCAINE HYDROCHLORIDE 10 MG/ML
5 INJECTION, SOLUTION EPIDURAL; INFILTRATION; INTRACAUDAL; PERINEURAL ONCE
Status: DISCONTINUED | OUTPATIENT
Start: 2022-05-18 | End: 2022-05-22 | Stop reason: HOSPADM

## 2022-05-18 RX ORDER — BUPIVACAINE HCL/PF 2.5 MG/ML
5 VIAL (ML) INJECTION ONCE
Status: COMPLETED | OUTPATIENT
Start: 2022-05-18 | End: 2022-05-18

## 2022-05-18 RX ORDER — METHYLPREDNISOLONE ACETATE 80 MG/ML
80 INJECTION, SUSPENSION INTRA-ARTICULAR; INTRALESIONAL; INTRAMUSCULAR; PARENTERAL; SOFT TISSUE ONCE
Status: COMPLETED | OUTPATIENT
Start: 2022-05-18 | End: 2022-05-18

## 2022-05-18 RX ADMIN — Medication 3 ML: at 11:49

## 2022-05-18 RX ADMIN — GADOBUTROL 2 ML: 604.72 INJECTION INTRAVENOUS at 11:48

## 2022-05-18 RX ADMIN — METHYLPREDNISOLONE ACETATE 80 MG: 80 INJECTION, SUSPENSION INTRA-ARTICULAR; INTRALESIONAL; INTRAMUSCULAR; PARENTERAL; SOFT TISSUE at 11:49

## 2022-05-18 NOTE — DISCHARGE INSTR - LAB
Epidural Steroid Injection   WHAT YOU NEED TO KNOW:   An epidural steroid injection ([facility]) is a procedure to inject steroid medicine into the epidural space  The epidural space is between your spinal cord and vertebrae  Steroids reduce inflammation and fluid buildup in your spine that may be causing pain  You may be given pain medicine along with the steroids  ACTIVITY  Do not drive or operate machinery today  No strenuous activity today - bending, lifting, etc   You may resume normal activites starting tomorrow - start slowly and as tolerated  You may shower today, but no tub baths or hot tubs  You may have numbness for several hours from the local anesthetic  Please use caution and common sense, especially with weight-bearing activities  CARE OF THE INJECTION SITE  If you have soreness or pain, apply ice to the area today (20 minutes on/20 minutes off)  Starting tomorrow, you may use warm, moist heat or ice if needed  You may have an increase or change in your discomfort for 36-48 hours after your treatment  Apply ice and continue with any pain medication you have been prescribed  Notify the [facility] if you have any of the following: redness, drainage, swelling, headache, stiff neck or fever above 100°F     SPECIAL INSTRUCTIONS  Our office will contact you in approximately 7 days for a progress report  MEDICATIONS  Continue to take all routine medications  Our office may have instructed you to hold some medications  As no general anesthesia was used in today's procedure, you should not experience any side effects related to anesthesia  If you have a problem specifically related to your procedure, please call our office at [phone]  Problems not related to your procedure should be directed to your primary care physician

## 2022-05-18 NOTE — INTERVAL H&P NOTE
Update: (This section must be completed if the H&P was completed greater than 24 hrs to procedure or admission)    H&P reviewed  After examining the patient, I find no changed to the H&P since it had been written  Patient re-evaluated   Accept as history and physical     Romeo Gurrola MD/May 18, 2022/11:34 AM

## 2022-05-19 DIAGNOSIS — G44.319 ACUTE POST-TRAUMATIC HEADACHE, NOT INTRACTABLE: ICD-10-CM

## 2022-05-19 RX ORDER — GABAPENTIN 400 MG/1
CAPSULE ORAL
Qty: 60 CAPSULE | Refills: 0 | Status: SHIPPED | OUTPATIENT
Start: 2022-05-19

## 2022-05-19 RX ORDER — GABAPENTIN 100 MG/1
CAPSULE ORAL
Qty: 60 CAPSULE | Refills: 0 | Status: SHIPPED | OUTPATIENT
Start: 2022-05-19

## 2022-05-19 NOTE — TELEPHONE ENCOUNTER
This is MUCH higher than I usually titrate up to for headache and I can refill now, but please have him stop increasing and if needed to go higher I would defer to pain management for such high dosing  Will discuss this in detail with him at next appointment as well

## 2022-05-19 NOTE — TELEPHONE ENCOUNTER
Called [phone] and left a detailed message on pt's answering machine of all of the below and for a call back if any questions or concerns

## 2022-05-19 NOTE — TELEPHONE ENCOUNTER
Pt called and states that he started gabapentin 100 mg at bedtime last month and titrated up  He is currently taking 100 mg 5 caps in the AM and at bedtime  He has been taking this for about 2 weeks  States that he is taking more than prescribed bec no benefit on the lower dosage  States that positive improvement w/ current dosage  Requesting updated script be sent to [facility] pharmacy  Rx entered  Pls review and sign off if agreeable      Thanks      [phone] ok to leave detailed message

## 2022-05-23 ENCOUNTER — TELEPHONE (OUTPATIENT)
Dept: NEUROLOGY | Facility: CLINIC | Age: 32
End: 2022-05-23

## 2022-05-25 ENCOUNTER — TELEPHONE (OUTPATIENT)
Dept: PAIN MEDICINE | Facility: CLINIC | Age: 32
End: 2022-05-25

## 2022-05-25 NOTE — TELEPHONE ENCOUNTER
Patient is having pain and pressure feeling in his back. He recently had an injection, pt seeking advice.  # [phone]

## 2022-05-25 NOTE — TELEPHONE ENCOUNTER
Patient called back after receiving voicemail to confirm his appt for tomorrow 5/26 w/ Nara Walsh  Patient confirmed date/time/location with me

## 2022-05-26 ENCOUNTER — OFFICE VISIT (OUTPATIENT)
Dept: NEUROLOGY | Facility: CLINIC | Age: 32
End: 2022-05-26
Payer: OTHER MISCELLANEOUS

## 2022-05-26 ENCOUNTER — TELEPHONE (OUTPATIENT)
Dept: NEUROLOGY | Facility: CLINIC | Age: 32
End: 2022-05-26

## 2022-05-26 VITALS
DIASTOLIC BLOOD PRESSURE: 82 MMHG | HEART RATE: 81 BPM | WEIGHT: 150.4 LBS | SYSTOLIC BLOOD PRESSURE: 120 MMHG | HEIGHT: 72 IN | BODY MASS INDEX: 20.37 KG/M2

## 2022-05-26 DIAGNOSIS — F44.7 FUNCTIONAL NEUROLOGICAL SYMPTOM DISORDER WITH MIXED SYMPTOMS: ICD-10-CM

## 2022-05-26 DIAGNOSIS — F32.A ANXIETY AND DEPRESSION: ICD-10-CM

## 2022-05-26 DIAGNOSIS — F43.10 POST-TRAUMATIC STRESS: ICD-10-CM

## 2022-05-26 DIAGNOSIS — G44.319 ACUTE POST-TRAUMATIC HEADACHE, NOT INTRACTABLE: Primary | ICD-10-CM

## 2022-05-26 DIAGNOSIS — F41.9 ANXIETY AND DEPRESSION: ICD-10-CM

## 2022-05-26 DIAGNOSIS — F43.10 POST-TRAUMATIC STRESS: Primary | ICD-10-CM

## 2022-05-26 PROBLEM — S06.0X0A CONCUSSION WITHOUT LOSS OF CONSCIOUSNESS: Status: RESOLVED | Noted: 2022-04-15 | Resolved: 2022-05-26

## 2022-05-26 PROCEDURE — 99215 OFFICE O/P EST HI 40 MIN: CPT | Performed by: PSYCHIATRY & NEUROLOGY

## 2022-05-26 RX ORDER — VENLAFAXINE HYDROCHLORIDE 75 MG/1
75 CAPSULE, EXTENDED RELEASE ORAL DAILY
Qty: 90 CAPSULE | Refills: 1 | Status: SHIPPED | OUTPATIENT
Start: 2022-05-26

## 2022-05-26 RX ORDER — VENLAFAXINE HYDROCHLORIDE 37.5 MG/1
37.5 CAPSULE, EXTENDED RELEASE ORAL DAILY
Qty: 7 CAPSULE | Refills: 0 | Status: SHIPPED | OUTPATIENT
Start: 2022-05-26 | End: 2022-06-02 | Stop reason: SDUPTHER

## 2022-05-26 NOTE — TELEPHONE ENCOUNTER
----- Message from Tracey Castro MD sent at 5/26/2022 12:45 PM EDT -----  Could you please help this patient with a list of therapists or psychologists covered by their insurance?

## 2022-05-26 NOTE — PROGRESS NOTES
[facility] Consult - Follow up   PATIENT:  Argentina Dalton  MRN:  [id number]  :  1990  DATE OF SERVICE:  2022  REFERRED BY: No ref  provider found  PMD: No primary care provider on file  Assessment/Plan:   Argentina Dalton is a  pleasant  27 y o  male with a past medical history that includes Status post rotator cuff repair, chronic back pain s/p lumbar surgery referred here for evaluation of headache  My initial evaluation 2022    H/O concussion -resolved  Acute post-traumatic headache, not intractable  Post-traumatic stress  On 03/10/2022 he was working when he was hit multiple times by boxes and a Pallet at work and then he fell to the ground  He had typical acute concussion symptoms as well as posttraumatic headache with migrainous features as well as other injuries and laceration to the right eyebrow  He was evaluated in the ED where noncontrast head CT and CT C/T/L-spine were unremarkable for acute pathology  He subsequently has been following with Pain Management, orthopedics and been back to the ED and was admitted for neck, back and shoulder pain  We discussed I would defer to those doctors for management of those issues and they have kept him out of work  He was referred to me for posttraumatic headaches  - as of 2022 He reports posttraumatic headaches have improved somewhat after the 1st few weeks when he was having constant nausea and vomiting and now still having daily posttraumatic headaches that wax and wane in severity  Typically can be worse for 20 minutes at a time with neuralgia features for which we will start trial of gabapentin with the help it may help with multiple types of pain and sleep    He has symptoms of posttraumatic stress with constant intrusion symptoms regarding the incident, negative impact on cognition and mood, avoidance, marked alterations in arousal and reactivity associated with a traumatic event for which I recommended counseling and if persists psychiatry  - as of 5/26/2022: Overall he has had multiple areas of improvement  Mood is a little better, but still irritable and some anxiety/PTS/depressive symptoms for which he is willing to see counselor  Also symptoms of deconditioning and being held out of work my other providers  He reports he continues to have daily headaches which he reports have not improved with gabapentin trial and he kept increasing until he got as high as 500 mg BID, but does not always take am dose  Will wean off if not helping headaches, but if he finds coming down or off of it, the other pain increases, we discussed I would defer to the pain management doctors if they would like him on it for other reasons  Sleep is a bit better  Trial of venlafaxine for headache prevention with the goal it can also help with other pains and mood as well  Workup:  -noncontrast head CT 03/10/2022:  No acute intracranial abnormality    Preventative:  - we discussed headache hygiene and lifestyle factors that may improve headaches  -  Will wean off of gabapentin  Discussed proper use, possible side effects and risks  - trial of venlafaxine 37 5 mg daily for 1 week then 75 mg daily  Discussed proper use, possible side effects and risks    - Currently on through other providers: none  - Past/ failed/contraindicated: gabapentin up to 500 mg BID or 300 mg TID did not help  - future options: amitriptyline (if off of venlafaxine), CGRP med, botox     Abortive:  - discussed not taking over-the-counter or prescription pain medications more than 3 days per week to prevent medication overuse/rebound headache  - Currently on through other providers:  Naproxen - not taking anymore  - Past/ failed/contraindicated:  Multiple medications in hospital he reports did not help, Valium, OTC meds, Medrol Dosepak  - future options:  Could consider Triptan, prochlorperazine, Toradol IM or p o , could consider trial for 5 days of Depakote or dexamethasone for prolonged migraine, ubrelvy, reyvow, nurtec    We have discussed concussions and the natural course of recovery  We have discussed that symptoms from a concussion typically take 2 weeks to resolve, and although sometimes it can feel like concussion symptoms linger on, at this point these symptoms would be related to contributing factors  - Contributing factors may include:   Post traumatic stress, Prolonged removal from normal routine,  posttraumatic headache,  comorbid injuries, anxiety or depression, stress, deconditioning,  comorbid medical diagnoses  - I have recommended gradual return of normal cognitive and physical activity with safety precautions, however defer to other doctors regarding his other injuries of back   - We discussed that newer research regarding concussion shows that the sooner one returns gradually to their normal physical and cognitive routine, the sooner one tends to recover  Prolonged removal from normal routine and deconditioning have been shown to prolong symptoms and worsen symptoms   - We discussed that sometimes there is a constellation of symptoms that some refer to as "post concussion syndrome," but I prefer not to use this term since that can be misleading and make people think they are still brain injured or "concussed," when the most common and likely etiology this far out from the head trauma is either contributing factors or a form of functional neurologic disorder with mixed symptoms  - We discussed how cognitive issues can have multiple causes and often related to multifactorial etiologies including stress, anxiety,  mood, pain, hypervigilance  and sleep issues and provided reassurance that, it is not likely the cognitive dysfunction is related to concussion at this point   - Safe driving precautions, should not drive at all if feeling sleepy or cognitively not well      * We discussed the most likely therapy to help in these cases would be counseling      Patient instructions      As far as work I would defer to his Occupational Medicine/worker's Comp team and spine team because if he only had symptoms related to what I am treating him for we discussed I would recommend gradual return to normalcy as soon as possible  consider counseling for stress  -our  will reach out to you to see if she can help with any resources for this     Headache/migraine treatment:   Abortive medications (for immediate treatment of a headache): It is ok to take ibuprofen, acetaminophen or naproxen (Advil, Tylenol,  Aleve, Excedrin) if they help your headaches you should limit these to No more than 3 times a week to avoid medication overuse/rebound headaches  Prescription preventive medications for headaches/migraines   (to take every day to help prevent headaches - not to take at the time of headache):  [x] gabapentin - will wean down since not helping headaches, defer to pain management doctor if they would like you to stay on it for other pain  - decreased to 400 mg nightly for 1 week and then 300 mg nightly for 1 week then 200 mg nightly for 1 week then 100 mg nightly and stop  [x] venlafaxine 37 5 mg tab daily for 7 days then increase to 75 mg daily and continue  Do not increase this medication without asking me    Typical dose for headache prevention:  75 mg-150 mg  For mood 75 mg - 225 mg     *Typically these types of medications take time untill you see the benefit, although some may see improvement in days, often it may take weeks, especially if the medication is being titrated up to a beneficial level  Please contact us if there are any concerns or questions regarding the medication  Lifestyle Recommendations:  [x] SLEEP - Maintain a regular sleep schedule: Adults need at least 7-8 hours of uninterrupted a night   Maintain good sleep hygiene:  Going to bed and waking up at consistent times, avoiding excessive daytime naps, avoiding caffeinated beverages in the evening, avoid excessive stimulation in the evening and generally using bed primarily for sleeping  One hour before bedtime would recommend turning lights down lower, decreasing your activity (may read quietly, listen to music at a low volume)  When you get into bed, should eliminate all technology (no texting, emailing, playing with your phone, iPad or tablet in bed)  [x] HYDRATION - Maintain good hydration  Drink  2L of fluid a day (4 typical small water bottles)  [x] DIET - Maintain good nutrition  In particular don't skip meals and try and eat healthy balanced meals regularly  [x] TRIGGERS - Look for other triggers and avoid them: Limit caffeine to 1-2 cups a day or less  Avoid dietary triggers that you have noticed bring on your headaches (this could include aged cheese, peanuts, MSG, aspartame and nitrates)  [x] EXERCISE - physical exercise as we all know is good for you in many ways, and not only is good for your heart, but also is beneficial for your mental health, cognitive health and  chronic pain/headaches  I would encourage at the least 5 days of physical exercise weekly for at least 30 minutes  Education and Follow-up  [x] Please call with any questions or concerns  Of course if any new concerning symptoms go to the emergency department    [x] Follow up 4 weeks, sooner if needed    [employer] - Download this free [employer] on your phone (they will offer subscription, but you do not have to do this)  - I recommend listening to the first approximately 5 or so lectures (more if you want) that are about 10-20 minutes long on the neuroscience of pain  - They discuss how pain works in the brain and steps to try and cure chronic pain    I recommend these free lectures from [employer]  "The basic neuroscience of pain"  "Pain is more than just tissue damage"  "How pain becomes chronic"  "What does the pain mean to you"  "What to do next"        CC:   We had the pleasure of evaluating Cyndee Day in neurological consultation today  Cyndee Day is a   right handed male who presents today for evaluation of headaches  History obtained from patient as well as available medical record review  Mom is here with him    History of Present Illness:   Interval history as of 5/26/2022  - He is following with pain management for chronic pain syndrome, 50% improvement with injection in back and ortho for shoulder  - getting better sleep, sleep at around 11 pm, up at 4 am   - he still being held out of work by other providers   - mood -rritable at times, no SI/HI    Headaches and migraines   - daily, sharp pain lasts 15-20 mins  - depressed from all the pain   - right parietal and temporal region sharp where he was hit,   - sometimes lightheaded if stands too fast, sometimes in shower   - "the day I go back to work will be a good one" getting paid way less     Preventative:   -  trial of gabapentin with gradual titration up to 300 mg nightly recommended, however patient continue titrating up to 500 mg b i d  and then requested refill 05/19/2022 where he was instructed this was much higher than recommended dose! Takes 500 mg at night, took none this morning     Abortive:   - exedrin - daily with head hurts (drinking coffee) - helps   - no longer taking naproxen      History as of initial visit 04/22/2022  On 03/10/2022, he was working when he got knocked into a wooden Pallet at work, and multiple things on the Pallet fell on top of him causing him to fall over    was struck at the right lateral eyebrown, knocked out tooth  Acute symptoms included:  No known LOC, saw stars, dizziness, imbalance, posttraumatic headache with nausea, photophobia, left lateral neck and shoulder pain, thoracic mid spine pain, laceration to right eyebrow  He was evaluated emergency department where noncontrast head CT and CT C/T/L-spine were unremarkable for acute pathology    He subsequently has been following with Pain Management, orthopedics and been to the emergency department as well as admitted for neck back and shoulder pain    He was referred to me for history of possible concussion and posttraumatic headache    - as of 4/22/2022: daily headaches    Work  Out since this happened through other providers  Normal work hours 7-5, 5-6 days a week     How often do the headaches occur?   - minor headaches earlier in life   - as of 4/22/2022: chronic daily headaches gradually improved, was vomiting daily for first few weeks, now a little better   What time of the day do the headaches start? Worse in am  How long do the headaches last? 20 mins and then become duller     Aura? without aura     Last eye exam: [facility] Jan/Feb     Where is your headache located and pain quality?   - right parietal and temporal region sharp where he was hit, can be shooting for 20 mins at a time  - constant pinching   Can travel elsewhere   What is the intensity of pain? Average: 5/10, worst 10/10  Associated symptoms:   [x] Nausea       [] Vomiting  - not anymore  [x] Photophobia     [x]Phonophobia      [] Osmophobia  [x] Blurred vision   [x] Light-headed or dizzy     [x] Tinnitus  - both    [] Ptosis      [] Facial droop  [] Lacrimation  [] Nasal congestion/rhinorrhea      Things that make the headache worse? No specific movements, any movement with steps     Headache triggers:  Lights, getting up too fast     Have you seen someone else for headaches or pain? Yes, pain management   Have you ever had any Brain imaging? yes    What medications do you take or have you taken for your headaches?    ABORTIVE:      exedrin migraine - helped him sleep   zofran for nausea helped    past  OTC medications have been ineffective   Naproxen  Medrol dose pack  Decadron in hospital   Valium did not help    Opioids helped body pain     PREVENTIVE:   -       Past/ failed/contraindicated:  -       LIFESTYLE  Sleep   - averages: 4 hours   Problems falling asleep?: Yes  Problems staying asleep?:  Yes    Water: not much per day  Caffeine: previously 2-3 times per day    Mood: "I feel depressed" Chiquis been away from kids for a week, staying with mom   Intrusion thoughts about the incident all day long  Agitated  Denies history of anxiety or depression or other diagnosed mood disorder    The following portions of the patient's history were reviewed and updated as appropriate: allergies, current medications, past family history, past medical history, past social history, past surgical history and problem list     Pertinent family history:  Family history of headaches:  migraine headaches in sister step   Any family history of aneurysms - No  Nothing neurologic    Pertinent social history:  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  Lives with wife kids - 10 in May, 2, other born in September     Past Medical History:     Past Medical History:   Diagnosis Date    Chronic back pain        Patient Active Problem List   Diagnosis    Acute midline low back pain    Back pain    Urinary incontinence    Chronic pain syndrome    Chronic left-sided low back pain with left-sided sciatica    Intervertebral disc disorder with radiculopathy of lumbosacral region    Lumbar post-laminectomy syndrome    Internal derangement of right shoulder    Internal derangement of left shoulder       Medications:      Current Outpatient Medications   Medication Sig Dispense Refill    aspirin-acetaminophen-caffeine (EXCEDRIN MIGRAINE) 250-250-65 MG per tablet Take 1 tablet by mouth every 6 (six) hours as needed for headaches      gabapentin (Neurontin) 100 mg capsule Take 1 cap by mouth twice a day along w/ 400 mg cap  Total 500 mg bid 60 capsule 0    gabapentin (NEURONTIN) 400 mg capsule Take 1 cap by mouth twice a day along w/ the 100 mg cap  Total 500 mg bid   60 capsule 0    venlafaxine (EFFEXOR-XR) 37 5 mg 24 hr capsule Take 1 capsule (37 5 mg total) by mouth daily for 7 days Then increase to 75 mg daily 7 capsule 0    venlafaxine (EFFEXOR-XR) 75 mg 24 hr capsule Take 1 capsule (75 mg total) by mouth daily 90 capsule 1    Ibuprofen (MOTRIN PO) Take 2 tablets by mouth if needed   (Patient not taking: Reported on 5/26/2022)      naproxen (NAPROSYN) 375 mg tablet Take 1 tablet (375 mg total) by mouth 2 (two) times a day with meals for 20 doses (Patient not taking: No sig reported) 20 tablet 0     No current facility-administered medications for this visit  Allergies:       Allergies   Allergen Reactions    Morphine Hives       Family History:     Family History   Problem Relation Age of Onset    No Known Problems Mother     No Known Problems Father        Social History:     Social History     Socioeconomic History    Marital status: /Civil Union     Spouse name: Not on file    Number of children: Not on file    Years of education: Not on file    Highest education level: Not on file   Occupational History    Not on file   Tobacco Use    Smoking status: Current Every Day Smoker    Smokeless tobacco: Never Used   Vaping Use    Vaping Use: Never used   Substance and Sexual Activity    Alcohol use: Never    Drug use: Never    Sexual activity: Not on file   Other Topics Concern    Not on file   Social History Narrative    Not on file     Social Determinants of Health     Financial Resource Strain: Not on file   Food Insecurity: Not on file   Transportation Needs: Not on file   Physical Activity: Not on file   Stress: Not on file   Social Connections: Not on file   Intimate Partner Violence: Not on file   Housing Stability: Not on file         Objective:       Physical Exam:                                                                 Vitals:            Constitutional:    /82 (BP Location: Left arm, Patient Position: Sitting, Cuff Size: Standard)   Pulse 81   Ht 6' (1 829 m)   Wt 68 2 kg (150 lb 6 4 oz)   BMI 20 40 kg/m²   BP Readings from Last 3 Encounters:   05/26/22 120/82   05/18/22 124/90   05/12/22 114/79     Pulse Readings from Last 3 Encounters:   05/26/22 81   05/18/22 78   05/12/22 57         Well developed, well nourished, well groomed  No dysmorphic features  HEENT:  Normocephalic atraumatic  See neuro exam   Chest:  Respirations appear regular and unlabored  Cardiovascular:  no observed significant swelling  Musculoskeletal:  (see below under neurologic exam for evaluation of motor function and gait)   Skin:  warm and dry, not diaphoretic  Psychiatric:  Normal behavior and appropriate affect        Neurological Examination:     Mental status/cognitive function:   Recent and remote memory intact  Attention span and concentration as well as fund of knowledge are appropriate for age  Normal language and spontaneous speech  Cranial Nerves:  III, IV, VI-Pupils were equal, round  Extraocular movements were full and conjugate   VII-facial expression symmetric  VIII-hearing grossly intact bilaterally   Motor Exam: symmetric bulk throughout  no atrophy, fasciculations or abnormal movements noted  Coordination:  no apparent dysmetria, ataxia or tremor noted  Gait: steady antalgic gait, no longer needing cane     Pertinent lab results:   See EMR for recent labs  04/16/2022 BMP was CO2 20  CBC with WBC 14 2 while on steroids  HIV nonreactive  No LFTs in system     Imaging: I have personally reviewed imaging and radiology read   -noncontrast head CT 03/10/2022:  No acute intracranial abnormality     - MRI C-spine 04/15/2022: Unremarkable  -MRI T-spine 04/15/2022: Unremarkable  MRI L-spine 04/15/2022: Stable exam   No abnormal leptomeningeal or cauda equina enhancement  L4-L5:  Stable mild posterior disc bulge and annular tear   No central canal stenosis   Mild facet arthropathy   No neural foraminal narrowing    L5-S1:  Stable posterior disc bulge and annular tear   Superimposed paracentral disc protrusion is stable   No central canal stenosis or neural foraminal narrowing  Please see EMR for multiple imaging of the spine which is not the reason for this visit  Review of Systems:   ROS obtained by medical assistant Personally reviewed and updated if indicated  I recommended PCP follow up for non neurologic problems  Review of Systems   Constitutional: Negative  Negative for appetite change and fever  HENT: Negative  Negative for hearing loss, tinnitus, trouble swallowing and voice change  Eyes: Positive for photophobia  Negative for pain  Respiratory: Negative  Negative for shortness of breath  Cardiovascular: Negative  Negative for palpitations  Gastrointestinal: Negative  Negative for nausea and vomiting  Endocrine: Negative  Negative for cold intolerance  Genitourinary: Negative  Negative for dysuria, frequency and urgency  Musculoskeletal: Negative  Negative for myalgias and neck pain  Skin: Negative  Negative for rash  Neurological: Positive for headaches  Negative for tremors, seizures, syncope, facial asymmetry, speech difficulty, weakness, light-headedness and numbness  Hematological: Negative  Does not bruise/bleed easily  Psychiatric/Behavioral: Negative  Negative for confusion, hallucinations and sleep disturbance  I have spent 32 minutes with Patient and   today in which greater than 50% of this time was spent in counseling/coordination of care regarding Diagnostic results, Prognosis, Risks and benefits of tx options, Intructions for management, Patient education, Importance of tx compliance, Risk factor reductions and Impressions  I also spent 15 minutes non face to face for this patient the same day         Author:  Nilam Andrade MD 5/26/2022 1:23 PM

## 2022-05-26 NOTE — PROGRESS NOTES
Patient ID: Royce Corrales is a 27 y o  male  Assessment/Plan:    No problem-specific Assessment & Plan notes found for this encounter  {Assess/PlanSmartLinks:84994}       Subjective:    HPI    {[facility] HPI texts:52143}    {Common ambulatory SmartLinks:40332}         Objective:    Height 6' (1 829 m), weight 68 2 kg (150 lb 6 4 oz)  Physical Exam    Neurological Exam      ROS:    Review of Systems   Constitutional: Negative  Negative for appetite change and fever  HENT: Negative  Negative for hearing loss, tinnitus, trouble swallowing and voice change  Eyes: Positive for photophobia  Negative for pain  Respiratory: Negative  Negative for shortness of breath  Cardiovascular: Negative  Negative for palpitations  Gastrointestinal: Negative  Negative for nausea and vomiting  Endocrine: Negative  Negative for cold intolerance  Genitourinary: Negative  Negative for dysuria, frequency and urgency  Musculoskeletal: Negative  Negative for myalgias and neck pain  Skin: Negative  Negative for rash  Neurological: Positive for dizziness and headaches  Negative for tremors, seizures, syncope, facial asymmetry, speech difficulty, weakness, light-headedness and numbness  Hematological: Negative  Does not bruise/bleed easily  Psychiatric/Behavioral: Negative  Negative for confusion, hallucinations and sleep disturbance

## 2022-05-26 NOTE — PATIENT INSTRUCTIONS
As far as work I would defer to his Occupational Medicine/worker's Comp team and spine team because if he only had symptoms related to what I am treating him for we discussed I would recommend gradual return to normalcy as soon as possible  consider counseling for stress  -our  will reach out to you to see if she can help with any resources for this    Headache/migraine treatment:   Abortive medications (for immediate treatment of a headache): It is ok to take ibuprofen, acetaminophen or naproxen (Advil, Tylenol,  Aleve, Excedrin) if they help your headaches you should limit these to No more than 3 times a week to avoid medication overuse/rebound headaches  Prescription preventive medications for headaches/migraines   (to take every day to help prevent headaches - not to take at the time of headache):  [x] gabapentin - will wean down since not helping headaches, defer to pain management doctor if they would like you to stay on it for other pain  - decreased to 400 mg nightly for 1 week and then 300 mg nightly for 1 week then 200 mg nightly for 1 week then 100 mg nightly and stop  [x] venlafaxine 37 5 mg tab daily for 7 days then increase to 75 mg daily and continue  Do not increase this medication without asking me    Typical dose for headache prevention:  75 mg-150 mg  For mood 75 mg - 225 mg     *Typically these types of medications take time untill you see the benefit, although some may see improvement in days, often it may take weeks, especially if the medication is being titrated up to a beneficial level  Please contact us if there are any concerns or questions regarding the medication  Lifestyle Recommendations:  [x] SLEEP - Maintain a regular sleep schedule: Adults need at least 7-8 hours of uninterrupted a night   Maintain good sleep hygiene:  Going to bed and waking up at consistent times, avoiding excessive daytime naps, avoiding caffeinated beverages in the evening, avoid excessive stimulation in the evening and generally using bed primarily for sleeping  One hour before bedtime would recommend turning lights down lower, decreasing your activity (may read quietly, listen to music at a low volume)  When you get into bed, should eliminate all technology (no texting, emailing, playing with your phone, iPad or tablet in bed)  [x] HYDRATION - Maintain good hydration  Drink  2L of fluid a day (4 typical small water bottles)  [x] DIET - Maintain good nutrition  In particular don't skip meals and try and eat healthy balanced meals regularly  [x] TRIGGERS - Look for other triggers and avoid them: Limit caffeine to 1-2 cups a day or less  Avoid dietary triggers that you have noticed bring on your headaches (this could include aged cheese, peanuts, MSG, aspartame and nitrates)  [x] EXERCISE - physical exercise as we all know is good for you in many ways, and not only is good for your heart, but also is beneficial for your mental health, cognitive health and  chronic pain/headaches  I would encourage at the least 5 days of physical exercise weekly for at least 30 minutes  Education and Follow-up  [x] Please call with any questions or concerns  Of course if any new concerning symptoms go to the emergency department    [x] Follow up 4-8 weeks, sooner if needed    [employer] - Download this free [employer] on your phone (they will offer subscription, but you do not have to do this)  - I recommend listening to the first approximately 5 or so lectures (more if you want) that are about 10-20 minutes long on the neuroscience of pain  - They discuss how pain works in the brain and steps to try and cure chronic pain    I recommend these free lectures from [employer]able  "The basic neuroscience of pain"  "Pain is more than just tissue damage"  "How pain becomes chronic"  "What does the pain mean to you"  "What to do next"

## 2022-05-27 NOTE — TELEPHONE ENCOUNTER
MSW phoned patient and introuduced self  Patient is interested in obtaining list of psychotherapist that are willing to accept WC  Patient is interested to driving to [facility], [place] and or [place]  MSW made patient aware that it is usually harder to find a therapist who is willing to work with SANDRO Gu 23 as they do not know if they will get paid until after they see the patient and submit visit to insurance company  Patient reported understanding  MSW will do a search in the towns in were patient is interested to travel to
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
## 2022-05-27 NOTE — TELEPHONE ENCOUNTER
It can take up to 2 weeks for max relief.  He may want to try to schedule OV sooner with Dr. Gorge Rosenthal
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
## 2022-05-27 NOTE — TELEPHONE ENCOUNTER
Anamaria Price 69 Social Work/Therapist  [address]  [phone]   MSW lvm to learn if accepting new clients and if she can work with Workers comp  Please call back    Jaquan Work/Therapist, TAMI, CCDP-D  [place] [address] [phone]   MSW lvm to learn if accepting new clients and if she can work with Workers comp  Please call back    [address] Work/Therapist, [facility]  [facility]  [address]  [phone]   -MSW lvm to learn if accepting new clients and if she can work with Workers comp  Please call back    [address]  [address]  [phone]   MSW lvm to learn if accepting new clients and if she can work with Workers comp  Please call back    Dr Carmela Carrera  Psychologist, PsyD  [address]  [phone]   MSW lvm to learn if accepting new clients and if she can work with Workers comp  Please call back    [facility]  [phone]   MSW lvm to learn if accepting new clients and if she can work with Workers comp  Please call back     [address]  [phone]   MSW lvm to learn if accepting new clients and if she can work with Workers comp  Please call back  -received call back can take new clients however only accepting private pay  She is not aware of any providers that are accepting [facility] can call [phone] (Scientology counseling) Ana Maria Donohue  [address]  Clinical Social Work/Therapist, [facility]  [address]  [address]  [place] [address]   Call Jaimie Golden  [phone]   MSW lvm to learn if accepting new clients and if she can work with Workers comp   Please call back    [facility] Yamel  Clinical Social Work/Therapist, MSOL, CSSW, LMSW, CFLEc, DBAc  [address]   [address]  [address] Mj Chan  [phone]   Accepting new clients  Typically his patient will pay upfront then he completes any necessary forms needed by insurance for reimbursement     [address]  Licensed Professional Counselor, MS, NCC  [place]  [phone]   MSW lvm to learn if accepting new clients and if she can work with Workers comp  Please call back    [facility] [place] [address]  [phone]    MSW lvm to learn if accepting new clients and if she can work with Workers comp  Please call back    [facility]  Dwain Weber  [phone]   MSW lvm to learn if accepting new clients and if she can work with Workers comp  Please call back      [address] [place] [address]   Call Mayda HOPE Licensed Psychologist  [phone]   Do not work with [facility]  [address] [place] [address]  [address] [place] [address]  [email or web address]  [phone]   MSW lvm to learn if accepting new clients and if she can work with Workers comp  Please call back    [address]  [address] [facility] Chet Jones  [phone]   -do not work with Workers comp

## 2022-05-27 NOTE — TELEPHONE ENCOUNTER
S/w pt. Advised he can try topical lidocaine patch or cream until AS returns to office. Advised he give injection full 2 weeks for benefit. Denies any redness or irritation to back but states itchy.  Advised pt can try topical hydrocortisone cream for itching if tolerated

## 2022-05-27 NOTE — TELEPHONE ENCOUNTER
Patient called again to say he is having burning and itching in his back. He is seeking advice.  Phone:  [phone]
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
## 2022-05-31 NOTE — TELEPHONE ENCOUNTER
MSW phoned patient and informed that MSW has tried to multiple counseling offices near the area where he lives  They are either not calling back or not accepting Workers comp  MSW asked patient if he is interested in obtaining a list of in network providers with his insurance  Patient informed that he has to search for the card and will call back this writer
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
## 2022-06-01 ENCOUNTER — TELEPHONE (OUTPATIENT)
Dept: NEUROLOGY | Facility: CLINIC | Age: 32
End: 2022-06-01

## 2022-06-01 DIAGNOSIS — G44.319 ACUTE POST-TRAUMATIC HEADACHE, NOT INTRACTABLE: ICD-10-CM

## 2022-06-01 NOTE — TELEPHONE ENCOUNTER
Dr Steven Baker  Psychologist, PsyD  [address] [facility]  [phone]   -MSW received incoming call form Dr Davin Fonseca  She will review [employer] and will call back to inform if willing to work with SANDRO Maher

## 2022-06-01 NOTE — TELEPHONE ENCOUNTER
Pt is 2 weeks s/p L TFESI  Pt reports pain, burning and itching to left lower back.    Denies any redness or s/s of infection at injection site  Pt asking for next step in treatment plan  No f/u scheduled

## 2022-06-01 NOTE — TELEPHONE ENCOUNTER
What happened with the gabapentin, the plan was to gradually wean off? I recommend he take it in the evening with food to see if this helps with the side effects as the side effects tend to get better as the body gets used to it  If he really would like to stop the medication let me know and I can send in something else, but I do feel venlafaxine really could help him  If he would like to try something else I would recommend amitriptyline 10 mg nightly for 1 week then 20 mg nightly for 1 week then 30 mg nightly for 1 week then 40 mg nightly for 1 week then 50 mg nightly and continue  This does not tend to cause side effects and can help with pain prevention and sleep  I would need him to take it as recommended and not increase it rapidly or higher than recommended

## 2022-06-01 NOTE — TELEPHONE ENCOUNTER
Pt states he is still noticing pain relief. States able to walk without limp and tingling feelings in leg resolved. Left lower back above buttock has had no pain relief since procedure and pt states it feels worse. Pt now also states a few "little bumps" near tailbone and left lower back. C/o itching to area. Pt was previously advised by [facility] to use hydrocortisone cream and or benadryl.  Pt states he has tried both without relief of itching

## 2022-06-01 NOTE — TELEPHONE ENCOUNTER
pt called and states that he is taking venlafaxine 37 5mg 1 tab hs,  states that within 2 hours of taking it he vomits and then has nausea throughtout the night and day  this occurs with each dose    he is not longer taking gabapentin  no improvement in regards to headaches   he is agreeable to alternative if that is what you recommend  please advise  [phone] to leave detailed message

## 2022-06-01 NOTE — TELEPHONE ENCOUNTER
He says his back is still hurting and itching. Left side above his buttocks area. Please advise.  Ph [phone]

## 2022-06-02 ENCOUNTER — OFFICE VISIT (OUTPATIENT)
Dept: PAIN MEDICINE | Facility: CLINIC | Age: 32
End: 2022-06-02
Payer: OTHER MISCELLANEOUS

## 2022-06-02 VITALS
SYSTOLIC BLOOD PRESSURE: 110 MMHG | HEIGHT: 72 IN | HEART RATE: 81 BPM | BODY MASS INDEX: 20.13 KG/M2 | DIASTOLIC BLOOD PRESSURE: 74 MMHG | WEIGHT: 148.6 LBS

## 2022-06-02 DIAGNOSIS — M46.1 SACROILIITIS (HCC): ICD-10-CM

## 2022-06-02 DIAGNOSIS — M51.17 INTERVERTEBRAL DISC DISORDER WITH RADICULOPATHY OF LUMBOSACRAL REGION: ICD-10-CM

## 2022-06-02 DIAGNOSIS — G89.4 CHRONIC PAIN SYNDROME: Primary | ICD-10-CM

## 2022-06-02 DIAGNOSIS — M54.42 LEFT-SIDED LOW BACK PAIN WITH LEFT-SIDED SCIATICA, UNSPECIFIED CHRONICITY: ICD-10-CM

## 2022-06-02 DIAGNOSIS — M96.1 LUMBAR POST-LAMINECTOMY SYNDROME: ICD-10-CM

## 2022-06-02 DIAGNOSIS — M54.42 CHRONIC LEFT-SIDED LOW BACK PAIN WITH LEFT-SIDED SCIATICA: ICD-10-CM

## 2022-06-02 DIAGNOSIS — G89.29 CHRONIC LEFT-SIDED LOW BACK PAIN WITH LEFT-SIDED SCIATICA: ICD-10-CM

## 2022-06-02 PROCEDURE — 99214 OFFICE O/P EST MOD 30 MIN: CPT

## 2022-06-02 RX ORDER — VENLAFAXINE HYDROCHLORIDE 37.5 MG/1
37.5 CAPSULE, EXTENDED RELEASE ORAL DAILY
Qty: 7 CAPSULE | Refills: 0 | Status: SHIPPED | OUTPATIENT
Start: 2022-06-02 | End: 2022-06-09

## 2022-06-02 RX ORDER — DICLOFENAC SODIUM 75 MG/1
75 TABLET, DELAYED RELEASE ORAL 2 TIMES DAILY
Qty: 60 TABLET | Refills: 3 | Status: SHIPPED | OUTPATIENT
Start: 2022-06-02
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
## 2022-06-02 NOTE — TELEPHONE ENCOUNTER
Excellent, I agree and am happy that he would like to continue as I do think it will help in the long run  I am more than happy to give him another week of 37 5 mg daily and will send now, hopefully workers comp will approve quickly so he can get it today  (let him know that sometimes meds can be delayed due to workers comp, so he should try and fill this now and make sure [facility] manager knows so that there is no issue continuing 37 5 mg tonight)  Also let him know about mychart and how all my instructions and detailed notes go on there

## 2022-06-02 NOTE — TELEPHONE ENCOUNTER
MSW phoned patient and discussed that so far she has not found anyone in there area where he lives that works with his WC  MSW discussed referral to [facility] for Psychotherapy as they will be able to work with his [facility] insurance  MSW also shared to patient that there is a waitlist as well  The nearest psychotherapy office would be [place] patient is willing to travel there and Is aware that he will be on their waitlist  Patient shared that he likes the idea of going through [facility] because all his medical care is being managed by [facility]  Patient shared that he does not know which regular insurance he has and his wife is still looking for the card   Patient agreeable for MSW to follow up with him on 6/14

## 2022-06-02 NOTE — TELEPHONE ENCOUNTER
pt made aware of below  states that he took 2 days of a lower dose of gabapentin and then stopped  states that the [facility] CM took the paper work ftom the office visit and he didn't know how to wean off gabapentin,     he states that he take venlafaxine right after he eats dinner  states that he didn't vomit last night after taking last night but did have nausea and dizziness but not as bad  he states that he would like to venlafaxine at this time and see if his body adjusts  he states that tonight would be the first night to increase to 75mg  he is asking to take the 37 5mg for a few more days and then increase to 75mg    he would need a new script called into the pharm

## 2022-06-03 ENCOUNTER — TELEPHONE (OUTPATIENT)
Dept: PSYCHIATRY | Facility: CLINIC | Age: 32
End: 2022-06-03

## 2022-06-07 NOTE — NURSING NOTE
Attempted to contact patient to discuss upcoming left shoulder MRI arthrogram at [address] [facility]  Message left  Will attempt to call back at a later date

## 2022-06-09 NOTE — NURSING NOTE
Call placed to patient to discuss upcoming left shoulder MRI arthrogram at [address] [place] [address] Radiology  Allergies reviewed  Verified patient currently takes Excedrin Migraine PRN, but not required to stop per protocol  Pre procedure instructions including diet and taking own medications discussed with patient  Patient instructed that he may eat normally and take medications including Excedrin Migraine PRN as usual before the procedure  Procedure and post procedure expectations and instructions reviewed with the patient  Patient verbalizes understanding and denies any questions at this time  Reminded of the location, date and time of the expected procedure

## 2022-06-15 ENCOUNTER — HOSPITAL ENCOUNTER (OUTPATIENT)
Dept: RADIOLOGY | Facility: HOSPITAL | Age: 32
Discharge: HOME | End: 2022-06-15
Attending: ORTHOPAEDIC SURGERY | Admitting: RADIOLOGY
Payer: OTHER MISCELLANEOUS

## 2022-06-15 ENCOUNTER — HOSPITAL ENCOUNTER (OUTPATIENT)
Dept: MRI IMAGING | Facility: HOSPITAL | Age: 32
Discharge: HOME | End: 2022-06-15
Attending: ORTHOPAEDIC SURGERY
Payer: OTHER MISCELLANEOUS

## 2022-06-15 DIAGNOSIS — M24.812 INTERNAL DERANGEMENT OF LEFT SHOULDER: ICD-10-CM

## 2022-06-15 PROCEDURE — A9585 GADOBUTROL INJECTION: HCPCS | Performed by: ORTHOPAEDIC SURGERY

## 2022-06-15 PROCEDURE — 73222 MRI JOINT UPR EXTREM W/DYE: CPT

## 2022-06-15 PROCEDURE — 77002 NEEDLE LOCALIZATION BY XRAY: CPT

## 2022-06-15 PROCEDURE — 23350 INJECTION FOR SHOULDER X-RAY: CPT

## 2022-06-15 PROCEDURE — G1004 CDSM NDSC: HCPCS

## 2022-06-15 RX ORDER — SODIUM CHLORIDE 9 MG/ML
5 INJECTION INTRAVENOUS ONCE
Status: COMPLETED | OUTPATIENT
Start: 2022-06-15 | End: 2022-06-15

## 2022-06-15 RX ORDER — LIDOCAINE HYDROCHLORIDE 10 MG/ML
10 INJECTION, SOLUTION EPIDURAL; INFILTRATION; INTRACAUDAL; PERINEURAL ONCE
Status: COMPLETED | OUTPATIENT
Start: 2022-06-15 | End: 2022-06-15

## 2022-06-15 RX ADMIN — LIDOCAINE HYDROCHLORIDE 10 ML: 10 INJECTION, SOLUTION EPIDURAL; INFILTRATION; INTRACAUDAL at 15:15

## 2022-06-15 RX ADMIN — SODIUM CHLORIDE 5 ML: 9 INJECTION, SOLUTION INTRAMUSCULAR; INTRAVENOUS; SUBCUTANEOUS at 15:15

## 2022-06-15 RX ADMIN — IOHEXOL 5 ML: 350 INJECTION, SOLUTION INTRAVENOUS at 15:15

## 2022-06-15 RX ADMIN — GADOBUTROL 2 ML: 604.72 INJECTION INTRAVENOUS at 15:15

## 2022-06-16 ENCOUNTER — TELEPHONE (OUTPATIENT)
Dept: PSYCHIATRY | Facility: CLINIC | Age: 32
End: 2022-06-16

## 2022-06-16 NOTE — TELEPHONE ENCOUNTER
[facility]    Referred by: Neuro     Please advised interviewee that they need to answer all questions truthfully to allow for best care and any misrepresentations of information may affect their ability to be seen at this clinic   => Was this discussed? Yes     [facility] -     Presenting Problem (in patient's words):   Pt chose not to go into detail referred by neuro   Are there any developmental disabilities? ? If yes, can they speak to you on the phone? If they are too limited to speak to you on phone, refer out No    Are you taking any psychiatric medications? Yes    => If yes, who prescribes? If yes, are they injectable medications? Gabapentin, voltaren, effexor-xr  Does the patient have a language barrier or hearing impairment? No    Have you been treated at [facility] by a therapist or a doctor in the past? If yes, who? No    Has the patient been hospitalized for mental health? No   If yes, how long ago was last hospitalization and where was it? Do you actively use alcohol or marijuana or illegal substances? If yes, what and how much - refer out to Drug and alcohol treatment if use is excessive or daily use of illegal substances No concerns of substance abuse are reported  Do you have a community treatment team or ? No    Legal History-     Does the patient have any history of arrests, MCC/halfway time, or DUIs? Yes  If Yes- Juvenile  1) What types of charges? Fighting   2) When were they last incarcerated? No   3) Are they currently on parole or probation? No     Minor Child-    Who has custody of the child? Is there a custody agreement? If there is a custody agreement remind parent that they must bring a copy to the first appt or they will not be seen       Intake Team, please check with provider before scheduling if flags come up such as:  - complex case  - legal history (other than DUI)  - communication barrier concerns are present  - if, in your judgment, this needs further review    ACCEPTED as a patient Yes  => Appointment Date: June 21, 2022 at   12:00pm with Garrett Ko   Referred Elsewhere? No    Name of Insurance Co: [address] ID# [id number]  [id number] Phone #  If ins is primary or secondary  If patient is a minor, parents information such as Name, D  O B of guarantor

## 2022-06-21 ENCOUNTER — TELEPHONE (OUTPATIENT)
Dept: PSYCHIATRY | Facility: CLINIC | Age: 32
End: 2022-06-21

## 2022-06-21 NOTE — TELEPHONE ENCOUNTER
Hi this patient called and is looking for a provider ( med management or therapy), he did not specify  Please give him call once you are available

## 2022-06-21 NOTE — TELEPHONE ENCOUNTER
spoke to pt and rescheduled pt for Tuesday August 23, 2022 at 1:00 pm with Stewart Ovalle     This is going through [employer]

## 2022-06-22 NOTE — NURSING NOTE
Call placed to patient to discuss upcoming right shoulder MRI arthrogram at [facility]  Allergies reviewed  Verified patient currently takes Excedrin Migraine PRN, but not required to stop per protocol  Pre procedure instructions including diet and taking own medications discussed with patient  Patient instructed that he may eat normally and take medications including Excedrine Migraine as usual before the procedure  Patient verbalizes understanding and denies any questions at this time  Reminded of the location, date and time of the expected procedure

## 2022-06-29 ENCOUNTER — TELEPHONE (OUTPATIENT)
Dept: PSYCHIATRY | Facility: CLINIC | Age: 32
End: 2022-06-29

## 2022-06-29 ENCOUNTER — HOSPITAL ENCOUNTER (OUTPATIENT)
Dept: RADIOLOGY | Facility: HOSPITAL | Age: 32
Discharge: HOME | End: 2022-06-29
Attending: ORTHOPAEDIC SURGERY

## 2022-06-29 NOTE — LETTER
22     Evenscecelia Melina   : 1990   [address] [facility]       It is the policy of [facility] to monitor and manage appointments that have been no-showed or cancelled with less than 48-hour notice  This is necessary to ensure that we are able to provide timely access for all patients to our providers  Undue numbers of unutilized appointments delays necessary medical care for all patients  Dear Tess Lot : We are sorry that you missed your appointment with Earl Murillo MA, LPC on 2022  Your health and follow-up care are important to us  As this was a New Patient appointment, you will need to contact the Intake department at [phone] if you would like to reschedule  Please be aware that our office policy states that if you 'no show' two Therapy appointments in a row without prior notice of cancellation, or three or more in a calendar year, you may be discharged from Therapy treatment  We want to bring this to your attention now to prevent an interruption of your care  If you have any questions about this policy, please call us at the number above  If we do not hear from you within 10 business days to make a follow up appointment, we will assume you are no longer interested in care here  Thank you in advance for your cooperation and assistance         Sincerely,      [address] [facility] [address] Staff

## 2022-07-13 ENCOUNTER — HOSPITAL ENCOUNTER (OUTPATIENT)
Dept: RADIOLOGY | Facility: CLINIC | Age: 32
Discharge: HOME | End: 2022-07-13
Admitting: ANESTHESIOLOGY
Payer: OTHER MISCELLANEOUS

## 2022-07-13 VITALS
TEMPERATURE: 98.4 F | HEART RATE: 87 BPM | SYSTOLIC BLOOD PRESSURE: 113 MMHG | OXYGEN SATURATION: 93 % | DIASTOLIC BLOOD PRESSURE: 66 MMHG | RESPIRATION RATE: 20 BRPM

## 2022-07-13 DIAGNOSIS — M46.1 SACROILIITIS (HCC): ICD-10-CM

## 2022-07-13 PROCEDURE — A9585 GADOBUTROL INJECTION: HCPCS | Performed by: ANESTHESIOLOGY

## 2022-07-13 PROCEDURE — 27096 INJECT SACROILIAC JOINT: CPT | Performed by: ANESTHESIOLOGY

## 2022-07-13 RX ORDER — METHYLPREDNISOLONE ACETATE 80 MG/ML
80 INJECTION, SUSPENSION INTRA-ARTICULAR; INTRALESIONAL; INTRAMUSCULAR; PARENTERAL; SOFT TISSUE ONCE
Status: COMPLETED | OUTPATIENT
Start: 2022-07-13 | End: 2022-07-13

## 2022-07-13 RX ORDER — BUPIVACAINE HCL/PF 2.5 MG/ML
5 VIAL (ML) INJECTION ONCE
Status: COMPLETED | OUTPATIENT
Start: 2022-07-13 | End: 2022-07-13

## 2022-07-13 RX ADMIN — METHYLPREDNISOLONE ACETATE 80 MG: 80 INJECTION, SUSPENSION INTRA-ARTICULAR; INTRALESIONAL; INTRAMUSCULAR; PARENTERAL; SOFT TISSUE at 13:19

## 2022-07-13 RX ADMIN — Medication 2 ML: at 13:19

## 2022-07-13 RX ADMIN — GADOBUTROL 0.2 ML: 604.72 INJECTION INTRAVENOUS at 13:18

## 2022-07-13 NOTE — NURSING NOTE
Received a call back from patient to discuss [place] [facility]  Allergies reviewed  Verified patient currently takes Excedrin Migraine PRN, but not required to stop per protocol  Pre procedure instructions including diet and taking own medications discussed with patient  Patient instructed that he may eat normally and take medications as usual before the procedure  Patient verbalizes understanding  Patient had a recent left shoulder MRI arthrogram and denies any questions at this time  Reminded of the location, date and time of the expected procedure

## 2022-07-13 NOTE — DISCHARGE INSTR - LAB
Steroid Joint Injection   WHAT YOU NEED TO KNOW:   A steroid joint injection is a procedure to inject steroid medicine into a joint  Steroid medicine decreases pain and inflammation  The injection may also contain an anesthetic (numbing medicine) to decrease pain  It may be done to treat conditions such as arthritis, gout, or carpal tunnel syndrome  The injections may be given in your knee, ankle, shoulder, elbow, wrist, ankle or sacroiliac joint  Do not apply heat to any area that is numb  If you have discomfort or soreness at the injection site, you may apply ice today, 20 minutes on and 20 minutes off  Tomorrow you may use ice or warm, moist heat  Do not apply ice or heat directly to the skin  You may have an increase or change in the discomfort for 36-48 hours after your treatment  Apply ice and continue with any pain medicine you have been prescribed  Do not do anything strenuous today  You may shower, but no tub baths or hot tubs today  You may resume your normal activities tomorrow, but do not overdo it  Resume normal activities slowly when you are feeling better  If you experience redness, drainage or swelling at the injection site, or if you develop a fever above 100 degrees, please call [facility] at [phone] or go to the Emergency Room  Continue to take all routine medicines prescribed by your primary care physician unless otherwise instructed by our staff  Most blood thinners should be started again according to your regularly scheduled dosing  If you have any questions, please give our office a call  As no general anesthesia was used in today's procedure, you should not experience any side effects related to anesthesia  If you have a problem specifically related to your procedure, please call our office at [phone]  Problems not related to your procedure should be directed to your primary care physician

## 2022-07-13 NOTE — NURSING NOTE
Attempted to contact patient to discuss upcoming right shoulder at [facility]  Message left

## 2022-07-13 NOTE — H&P
History of Present Illness: The patient is a 27 y o  male who presents with complaints of tenderness noted over the left sacroiliac joint    Patient Active Problem List   Diagnosis    Acute midline low back pain    Back pain    Urinary incontinence    Chronic pain syndrome    Chronic left-sided low back pain with left-sided sciatica    Intervertebral disc disorder with radiculopathy of lumbosacral region    Lumbar post-laminectomy syndrome    Internal derangement of right shoulder    Internal derangement of left shoulder       Past Medical History:   Diagnosis Date    Chronic back pain        Past Surgical History:   Procedure Laterality Date    BACK SURGERY  2019    lumbar    DISCECTOMY SPINE LUMBAR W/ ARTHROPLASTY  02/2019    L5-S1    FL INJECTION LEFT SHOULDER (ARTHROGRAM)  6/15/2022    ORTHOPEDIC SURGERY      SHOULDER SURGERY Right     2019         Current Outpatient Medications:     aspirin-acetaminophen-caffeine (EXCEDRIN MIGRAINE) 250-250-65 MG per tablet, Take 1 tablet by mouth every 6 (six) hours as needed for headaches, Disp: , Rfl:     diclofenac (VOLTAREN) 75 mg EC tablet, Take 1 tablet (75 mg total) by mouth 2 (two) times a day, Disp: 60 tablet, Rfl: 3    gabapentin (Neurontin) 100 mg capsule, Take 1 cap by mouth twice a day along w/ 400 mg cap  Total 500 mg bid, Disp: 60 capsule, Rfl: 0    gabapentin (NEURONTIN) 400 mg capsule, Take 1 cap by mouth twice a day along w/ the 100 mg cap   Total 500 mg bid , Disp: 60 capsule, Rfl: 0    Ibuprofen (MOTRIN PO), Take 2 tablets by mouth if needed   (Patient not taking: No sig reported), Disp: , Rfl:     naproxen (NAPROSYN) 375 mg tablet, Take 1 tablet (375 mg total) by mouth 2 (two) times a day with meals for 20 doses (Patient not taking: No sig reported), Disp: 20 tablet, Rfl: 0    venlafaxine (EFFEXOR-XR) 37 5 mg 24 hr capsule, Take 1 capsule (37 5 mg total) by mouth daily for 7 days (adding one more week of 37 5 mg, Then increase to 75 mg daily), Disp: 7 capsule, Rfl: 0    venlafaxine (EFFEXOR-XR) 75 mg 24 hr capsule, Take 1 capsule (75 mg total) by mouth daily, Disp: 90 capsule, Rfl: 1    Allergies   Allergen Reactions    Morphine Hives       Physical Exam:   Vitals:    07/13/22 1304   BP: 112/73   Pulse: 94   Resp: 20   Temp: 98 4 °F (36 9 °C)   SpO2: 96%     General: Awake, Alert, Oriented x 3, Mood and affect appropriate  Respiratory: Respirations even and unlabored  Cardiovascular: Peripheral pulses intact; no edema  Musculoskeletal Exam:  Tenderness noted over the left sacroiliac joint    ASA Score: 2    Patient/Chart Verification  Patient ID Verified: Verbal  ID Band Applied: No  Consents Confirmed: To be obtained in the Pre-Procedure area  H&P( within 30 days) Verified: To be obtained in the Pre-Procedure area  Interval H&P(within 24 hr) Complete (required for Outpatients and Surgery Admit only): To be obtained in the Pre-Procedure area  Allergies Reviewed: Yes  Anticoag/NSAID held?: NA  Currently on antibiotics?: No  Pregnancy denied?: NA    Assessment:   1   Sacroiliitis (HCC)        Plan: Left SI joint injection

## 2022-07-20 ENCOUNTER — HOSPITAL ENCOUNTER (OUTPATIENT)
Dept: MRI IMAGING | Facility: HOSPITAL | Age: 32
Discharge: HOME | End: 2022-07-20
Attending: ORTHOPAEDIC SURGERY

## 2022-07-20 ENCOUNTER — HOSPITAL ENCOUNTER (OUTPATIENT)
Dept: RADIOLOGY | Facility: HOSPITAL | Age: 32
Discharge: HOME | End: 2022-07-20
Attending: ORTHOPAEDIC SURGERY

## 2022-07-20 DIAGNOSIS — M24.811 INTERNAL DERANGEMENT OF RIGHT SHOULDER: ICD-10-CM

## 2022-08-10 ENCOUNTER — TELEPHONE (OUTPATIENT)
Dept: PAIN MEDICINE | Facility: CLINIC | Age: 32
End: 2022-08-10

## 2022-08-10 ENCOUNTER — TELEPHONE (OUTPATIENT)
Dept: OBGYN CLINIC | Facility: MEDICAL CENTER | Age: 32
End: 2022-08-10

## 2022-08-10 NOTE — TELEPHONE ENCOUNTER
Neeraj Manzo would like to schedule a deposition with Dr Shyann Martinez   Please be advised     Law office  can be reached @ [phone]

## 2022-08-10 NOTE — TELEPHONE ENCOUNTER
Patient sees Dr Brianna Muller from [facility] office called about deposition, sent email to practice administrator

## 2022-08-16 ENCOUNTER — TELEPHONE (OUTPATIENT)
Dept: PAIN MEDICINE | Facility: CLINIC | Age: 32
End: 2022-08-16

## 2022-09-27 ENCOUNTER — OFFICE VISIT (OUTPATIENT)
Dept: OBGYN CLINIC | Facility: OTHER | Age: 32
End: 2022-09-27
Payer: OTHER MISCELLANEOUS

## 2022-09-27 VITALS — HEIGHT: 72 IN | BODY MASS INDEX: 20.05 KG/M2 | WEIGHT: 148 LBS

## 2022-09-27 DIAGNOSIS — M75.102 TEAR OF LEFT SUPRASPINATUS TENDON: ICD-10-CM

## 2022-09-27 DIAGNOSIS — S43.432A GLENOID LABRAL TEAR, LEFT, INITIAL ENCOUNTER: Primary | ICD-10-CM

## 2022-09-27 DIAGNOSIS — M24.811 INTERNAL DERANGEMENT OF RIGHT SHOULDER: ICD-10-CM

## 2022-09-27 PROCEDURE — 99214 OFFICE O/P EST MOD 30 MIN: CPT | Performed by: ORTHOPAEDIC SURGERY

## 2022-09-27 NOTE — PROGRESS NOTES
Assessment  Internal derangement of right shoulder    Left posterior glenoid labral tear  Partial-thickness articular sided Tear of left supraspinatus tendon    Discussion and Plan:  · Reviewed previous MRI imaging of the left shoulder and discussed results  Patient does have labral pathology in his left shoulder which is what I clinically was worried about as well as partial undersurface supraspinatus tendon tearing, these would be amenable to arthroscopic evaluation and likely repair of his posterior glenoid labrum of the left shoulder  · Re-ordered Mri arthrogram of the RT shoulder to evaluate for possible rotator cuff and labral pathology due to patient symptoms being much more significant in the right shoulder than the left shoulder  Will review the results of the right shoulder MRI arthrogram 1 is available so we can discuss further treatment options  · Continue with outpatient physical therapy to help with ROM and strength of both shoulders     Subjective:   Patient ID: Ken Main is a 34 y o  male      Ken Main is a 34 y o   male who presents for follow-up evaluation of bilateral shoulders  This is a worker's comp case  Right is worse than left  Received a left shoulder MRI arthrogram in June of 2022, however he never got imaging on the right shoulder as his worker's comp ended  He is here today for re-evaluation of both shoulders  Has been completing physical therapy for the shoulders 3 times per week, notes minimal improvement  He has a lot of pain and weakness with the right shoulder are active range of motion  Certainly the right shoulder is more symptomatic than the left although both are problematic  Also notes pain with all overhead activities and lifting  PMHX right shoulder rotator cuff repair in 2019, this was outside of [facility] and those records are not available              The following portions of the patient's history were reviewed and updated as appropriate: allergies, current medications, past family history, past medical history, past social history, past surgical history and problem list     Review of Systems   Constitutional: Positive for activity change  Negative for chills, fever and unexpected weight change  HENT: Negative for hearing loss, nosebleeds and sore throat  Eyes: Negative for pain, redness and visual disturbance  Respiratory: Negative for cough, shortness of breath and wheezing  Cardiovascular: Negative for chest pain, palpitations and leg swelling  Gastrointestinal: Negative for abdominal pain, nausea and vomiting  Endocrine: Negative for polyphagia and polyuria  Genitourinary: Negative for dysuria and hematuria  Musculoskeletal: Positive for arthralgias and myalgias  See HPI   Skin: Negative for rash and wound  Neurological: Negative for dizziness, numbness and headaches  Psychiatric/Behavioral: Negative for decreased concentration and suicidal ideas  The patient is not nervous/anxious  Objective:  Ht 6' (1 829 m)   Wt 67 1 kg (148 lb)   BMI 20 07 kg/m²       Right Shoulder Exam     Tenderness   The patient is experiencing no tenderness  Range of Motion   Right shoulder active abduction: pain at end range  External rotation: normal   Forward flexion: normal   Internal rotation 0 degrees: Lumbar     Muscle Strength   Abduction: 5/5     Tests   Drop arm: negative    Other   Erythema: absent  Sensation: normal  Pulse: present            Physical Exam  Vitals and nursing note reviewed  Constitutional:       Appearance: He is well-developed  HENT:      Head: Normocephalic and atraumatic  Eyes:      General: No scleral icterus  Extraocular Movements: Extraocular movements intact  Conjunctiva/sclera: Conjunctivae normal    Cardiovascular:      Rate and Rhythm: Normal rate  Pulmonary:      Effort: Pulmonary effort is normal  No respiratory distress     Musculoskeletal:      Cervical back: Normal range of motion and neck supple  Comments: As noted in HPI   Skin:     General: Skin is warm and dry  Neurological:      Mental Status: He is alert and oriented to person, place, and time  Psychiatric:         Behavior: Behavior normal            I have personally reviewed pertinent films in PACS and my interpretation is as follows      MRI arthrogram of the left shoulder taken on 06/15/2022 shows an articular sided partial-thickness supraspinatus tendon tear as well as a posterior superior glenoid labral tear    Scribe Attestation    I,:  Cherie Beck am acting as a scribe while in the presence of the attending physician :       I,:  Levonia Landau, MD personally performed the services described in this documentation    as scribed in my presence :

## 2023-08-03 ENCOUNTER — TRANSCRIBE ORDERS (OUTPATIENT)
Dept: URGENT CARE | Age: 33
End: 2023-08-03

## 2023-08-03 ENCOUNTER — APPOINTMENT (OUTPATIENT)
Dept: RADIOLOGY | Age: 33
End: 2023-08-03
Payer: OTHER MISCELLANEOUS

## 2023-08-03 ENCOUNTER — APPOINTMENT (OUTPATIENT)
Dept: URGENT CARE | Age: 33
End: 2023-08-03
Payer: OTHER MISCELLANEOUS
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
## 2023-08-03 PROCEDURE — G0382 LEV 3 HOSP TYPE B ED VISIT: HCPCS

## 2023-08-03 PROCEDURE — 99283 EMERGENCY DEPT VISIT LOW MDM: CPT

## 2023-08-03 PROCEDURE — 73030 X-RAY EXAM OF SHOULDER: CPT

## 2023-08-03 PROCEDURE — 73110 X-RAY EXAM OF WRIST: CPT

## 2023-08-03 PROCEDURE — 72040 X-RAY EXAM NECK SPINE 2-3 VW: CPT

## 2023-11-10 ENCOUNTER — TELEPHONE (OUTPATIENT)
Age: 33
End: 2023-11-10

## 2023-11-10 NOTE — TELEPHONE ENCOUNTER
Caller: Charu Bowens workers comp    Doctor: Eboni Iyer    Reason for call: Called to verify patient's appt 11/9. Appt had been previously canceled and rescheduled 11/16.     Call back#: n/a

## 2023-11-16 ENCOUNTER — OFFICE VISIT (OUTPATIENT)
Dept: OBGYN CLINIC | Facility: OTHER | Age: 33
End: 2023-11-16
Payer: OTHER MISCELLANEOUS

## 2023-11-16 VITALS
HEIGHT: 72 IN | BODY MASS INDEX: 20.18 KG/M2 | SYSTOLIC BLOOD PRESSURE: 123 MMHG | WEIGHT: 149 LBS | DIASTOLIC BLOOD PRESSURE: 77 MMHG | HEART RATE: 61 BPM

## 2023-11-16 DIAGNOSIS — M24.811 INTERNAL DERANGEMENT OF RIGHT SHOULDER: Primary | ICD-10-CM

## 2023-11-16 PROCEDURE — 99214 OFFICE O/P EST MOD 30 MIN: CPT | Performed by: ORTHOPAEDIC SURGERY

## 2023-11-16 NOTE — PROGRESS NOTES
Assessment  Diagnoses and all orders for this visit:    Internal derangement of right shoulder    Discussion and Plan:    Patient has an examination worrisome for labral vs RTC tear pathology, given their lack of improvement with physical therapy and poor quality of MRI I recommend a MRI arthrogram scan to evaluate for surgical pathology. We will review the results of the study when it has been obtained and discuss further treatment options. I have no comments upon his ability to perform his duties at work at this time as I do not have information upon which to base those suggestions and recommend he continue with what ever restrictions he has been provided by his treating physicians up to this point. I did review the MRI of his knee at his request which does not show any obvious pathology, given his persistent symptoms I recommend he obtain an opinion from one of my colleagues who specialize in knee surgery. Subjective:   Patient ID: Silviano Leon is a 28 y.o. male      HPI    The patient presents with a chief complaint of right shoulder and right knee pain. The pain began 3 month(s) ago and is associated with an acute injury. Patient reports a WC injury on 8/03/23. He reports he stepped on a nail causing him to twist his knee, fall and land on the right shoulder. He has been treating at [facility] [place]. [facility]. He was provided with right knee CS injection and referral to PT for the shoulder and knee. The patient describes the pain as aching, dull, and sharp in intensity,  constant in timing, and localizes the pain to the  right superior and anterior shoulder and anterolateral knee. The knee pain is worse with  WB activities  and relieved by rest.  He reports constant right shoulder pain. The pain is not associated with numbness and tingling. The pain is not associated with constitutional symptoms. The patient is awoken at night by the pain.         The following portions of the patient's history were reviewed and updated as appropriate: allergies, current medications, past family history, past medical history, past social history, past surgical history and problem list.        Objective:  /77 (BP Location: Left arm, Patient Position: Sitting, Cuff Size: Standard)   Pulse 61   Ht 6' (1.829 m)   Wt 67.6 kg (149 lb)   BMI 20.21 kg/m²       Right Knee Exam     Tenderness   The patient is experiencing tenderness in the lateral joint line. Range of Motion   The patient has normal right knee ROM. Tests   Varus: negative Valgus: negative    Other   Erythema: absent  Sensation: normal  Pulse: present  Swelling: none  Effusion: no effusion present      Right Shoulder Exam     Range of Motion   The patient has normal right shoulder ROM. Muscle Strength   Abduction: 4/5   Internal rotation: 5/5   External rotation: 5/5     Tests   Johnson test: positive  Cross arm: positive  Impingement: positive    Other   Erythema: absent  Sensation: normal  Pulse: present            Physical Exam  Vitals reviewed. Constitutional:       Appearance: He is well-developed. HENT:      Head: Normocephalic. Eyes:      Pupils: Pupils are equal, round, and reactive to light. Pulmonary:      Effort: Pulmonary effort is normal.   Abdominal:      General: Abdomen is flat. There is no distension. Musculoskeletal:      Right knee: No effusion. Skin:     General: Skin is warm and dry. I have personally reviewed pertinent films in PACS and my interpretation is as follows. MRI right shoulder suspected supraspinatus tear  MRI right knee demonstrates degenerative changes posterior horn medial meniscus, no definitive tear.      Scribe Attestation      I,:  Jorge Fried am acting as a scribe while in the presence of the attending physician.:       I,:  Crista June MD personally performed the services described in this documentation    as scribed in my presence.:

## 2023-11-17 ENCOUNTER — TELEPHONE (OUTPATIENT)
Age: 33
End: 2023-11-17

## 2023-11-17 NOTE — TELEPHONE ENCOUNTER
They can have the office note and the MRI and arthrogram orders.      We did not refer to therapy so no script to provide and we did not do letter for work because as mentioned in the office note, Dr Easley has no comment on work status.

## 2023-11-17 NOTE — TELEPHONE ENCOUNTER
Caller: W/C    Doctor: Kaushal    Reason for call: Workers comp requesting order script of MRI and Physical Therapy and work status on patient   Please advise      Call back#: [phone]   Fax-workers comp #[phone]    FAX#[phone] to [employer]

## 2023-11-20 NOTE — TELEPHONE ENCOUNTER
Office visit note and MRI Arthrogram orders   Faxed to fax# provided  Confirmation that fax went through was received

## 2023-11-21 ENCOUNTER — TELEPHONE (OUTPATIENT)
Age: 33
End: 2023-11-21

## 2023-11-21 NOTE — TELEPHONE ENCOUNTER
Caller: Kamille GREER    Doctor/Office: Kaushal MOLINA#: n/a      What needs to be faxed:MRI scripts    ATTN to: [employer]    Fax#: [phone]   Documents were successfully e-faxed

## 2023-12-01 ENCOUNTER — OFFICE VISIT (OUTPATIENT)
Dept: OBGYN CLINIC | Facility: CLINIC | Age: 33
End: 2023-12-01
Payer: OTHER MISCELLANEOUS

## 2023-12-01 VITALS
SYSTOLIC BLOOD PRESSURE: 116 MMHG | BODY MASS INDEX: 20.18 KG/M2 | HEART RATE: 63 BPM | WEIGHT: 149 LBS | HEIGHT: 72 IN | DIASTOLIC BLOOD PRESSURE: 73 MMHG

## 2023-12-01 DIAGNOSIS — G89.29 CHRONIC PAIN OF RIGHT KNEE: Primary | ICD-10-CM

## 2023-12-01 DIAGNOSIS — M25.561 CHRONIC PAIN OF RIGHT KNEE: Primary | ICD-10-CM

## 2023-12-01 PROCEDURE — 99213 OFFICE O/P EST LOW 20 MIN: CPT | Performed by: ORTHOPAEDIC SURGERY

## 2023-12-01 RX ORDER — IBUPROFEN 600 MG/1
TABLET ORAL
COMMUNITY
Start: 2023-09-21

## 2023-12-01 NOTE — PROGRESS NOTES
CHIEF COMPLAINT/REASON FOR VISIT  Chief Complaint   Patient presents with    Right Knee - Pain        HISTORY OF PRESENT ILLNESS  Keira Wiggins is a 28 y.o. male who presents for evaluation of his right knee. Referred by Dr Ramin Celestin  Patient states he had an injury at work on 8/3/2023 when he stepped on a nail causing him to twist his knee and land on his Right shoulder. He is a . He is being treated by Dr Ramin Celestin for his shoulder injury. Had a knee injection at [facility] and had attended Physical Therapy for approx 3 months. Patient states the injection did not seem to alleviate any of his pain nor did Physical Therapy  Patient states WB causes sharp throbbing pain in his knee. Rest alleviates      REVIEW OF SYSTEMS  Review of systems was performed and, outside that mentioned in the HPI, it was negative for symptomology related to the integumentary, hematologic, immunologic, allergic, neurologic, cardiovascular, respiratory, GI or  systems.     MEDICAL HISTORY  Patient Active Problem List   Diagnosis    Acute midline low back pain    Back pain    Urinary incontinence    Chronic pain syndrome    Chronic left-sided low back pain with left-sided sciatica    Intervertebral disc disorder with radiculopathy of lumbosacral region    Lumbar post-laminectomy syndrome    Internal derangement of right shoulder    Internal derangement of left shoulder    Tear of left supraspinatus tendon       SURGICAL HISTORY  Past Surgical History:   Procedure Laterality Date    BACK SURGERY  2019    lumbar    DISCECTOMY SPINE LUMBAR W/ ARTHROPLASTY  02/2019    L5-S1    FL INJECTION LEFT SHOULDER (ARTHROGRAM)  6/15/2022    ORTHOPEDIC SURGERY      SHOULDER SURGERY Right     2019       CURRENT MEDICATIONS    Current Outpatient Medications:     ibuprofen (MOTRIN) 600 mg tablet, TAKE 1 TABLET BY MOUTH 4 TIMES A DAY WITH FOOD, Disp: , Rfl:     aspirin-acetaminophen-caffeine (EXCEDRIN MIGRAINE) 250-250-65 MG per tablet, Take 1 tablet by mouth every 6 (six) hours as needed for headaches (Patient not taking: Reported on 11/16/2023), Disp: , Rfl:     diclofenac (VOLTAREN) 75 mg EC tablet, Take 1 tablet (75 mg total) by mouth 2 (two) times a day (Patient not taking: Reported on 11/16/2023), Disp: 60 tablet, Rfl: 3    gabapentin (Neurontin) 100 mg capsule, Take 1 cap by mouth twice a day along w/ 400 mg cap. Total 500 mg bid (Patient not taking: Reported on 11/16/2023), Disp: 60 capsule, Rfl: 0    gabapentin (NEURONTIN) 400 mg capsule, Take 1 cap by mouth twice a day along w/ the 100 mg cap. Total 500 mg bid.  (Patient not taking: Reported on 11/16/2023), Disp: 60 capsule, Rfl: 0    Ibuprofen (MOTRIN PO), Take 2 tablets by mouth if needed (Patient not taking: Reported on 12/1/2023), Disp: , Rfl:     venlafaxine (EFFEXOR-XR) 75 mg 24 hr capsule, Take 1 capsule (75 mg total) by mouth daily (Patient not taking: Reported on 11/16/2023), Disp: 90 capsule, Rfl: 1    SOCIAL HISTORY  Social History     Socioeconomic History    Marital status: /Civil Union     Spouse name: Not on file    Number of children: Not on file    Years of education: Not on file    Highest education level: Not on file   Occupational History    Not on file   Tobacco Use    Smoking status: Every Day    Smokeless tobacco: Never   Vaping Use    Vaping Use: Never used   Substance and Sexual Activity    Alcohol use: Never    Drug use: Never    Sexual activity: Not on file   Other Topics Concern    Not on file   Social History Narrative    Not on file     Social Determinants of Health     Financial Resource Strain: Not on file   Food Insecurity: Not on file   Transportation Needs: Not on file   Physical Activity: Not on file   Stress: Not on file   Social Connections: Not on file   Intimate Partner Violence: Not on file   Housing Stability: Not on file       Objective     VITAL SIGNS  Ht 6' (1.829 m) Comment: verbal  Wt 67.6 kg (149 lb)   BMI 20.21 kg/m²        PHYSICAL EXAMINATION    Musculoskeletal: Right Knee Examination:  General: The patient is alert, oriented, and pleasant to interact with. Patient ambulates with Normal gait pattern  Assistive Device: No  Alignment: normal  Skin is warm and dry to touch with no signs of erythema, ecchymosis, or infection   Effusion: none  ROM: 0° - 135°  verus 0° - 135° on contralateral side  MMT: 5/5 throughout  TTP: Lateral joint line  Flexor and extensor mechanisms are intact   Knee is stable to varus and valgus stress  Nupur's Test Negative    Lachman's Test - 1A  Anterior Drawer Test - 1A  Posterior Drawer Test - 1A  Pivot Shift - 0  Lateral Patellar Glide - 1/4  Medial Patellar Glide - 1/4  Patella tracks centrally without palpable crepitus  Calf compartments are soft and supple  negative Patel's sign  2+ DP and PT pulses with brisk capillary refill to the toes  Sural, saphenous, tibial, superficial, and deep peroneal motor and sensory distributions intact  Sensation light touch intact distally        RADIOGRAPHIC EXAMINATION/DIAGNOSTICS:  No results found. Unable to review MRI. No images in system    ASSESSMENT/PLAN:  Right knee Pain: Possible Internal derangement     Today, we discussed conservative treatment options including but are certainly not limited to: Rest, ice, compression, elevation, activity modification, anti-inflammatory medication, physical therapy, and/or injections. We discussed benefits of each potential treatment option. After discussion, patient was agreeable to trying Rest, Ice, Compression, Elevation, Activity Modification, Anti-inflammatory Medication, and Physical Therapy. Consult for Occupational medicine and Script for Physical Therapy We will plan to follow up with the patient as needed. They are understanding that if the pain should worsen or they develop new symptoms to please reach out to us sooner. Patient is understanding and agreeable to this plan.

## 2023-12-06 ENCOUNTER — TELEPHONE (OUTPATIENT)
Age: 33
End: 2023-12-06

## 2023-12-06 NOTE — TELEPHONE ENCOUNTER
Caller: Patient    Doctor/Office: Kaushal Moise regarding :  patient calling to schedule an appt with narinder    Call was transferred to: narinder

## 2023-12-07 ENCOUNTER — OFFICE VISIT (OUTPATIENT)
Dept: OBGYN CLINIC | Facility: OTHER | Age: 33
End: 2023-12-07
Payer: OTHER MISCELLANEOUS

## 2023-12-07 ENCOUNTER — TELEPHONE (OUTPATIENT)
Age: 33
End: 2023-12-07

## 2023-12-07 VITALS
HEART RATE: 51 BPM | BODY MASS INDEX: 20.32 KG/M2 | SYSTOLIC BLOOD PRESSURE: 118 MMHG | WEIGHT: 150 LBS | DIASTOLIC BLOOD PRESSURE: 73 MMHG | HEIGHT: 72 IN

## 2023-12-07 DIAGNOSIS — S43.431D TEAR OF RIGHT GLENOID LABRUM, SUBSEQUENT ENCOUNTER: Primary | ICD-10-CM

## 2023-12-07 PROCEDURE — 99214 OFFICE O/P EST MOD 30 MIN: CPT | Performed by: ORTHOPAEDIC SURGERY

## 2023-12-07 RX ORDER — ALBUTEROL SULFATE 90 UG/1
2 AEROSOL, METERED RESPIRATORY (INHALATION) EVERY 6 HOURS PRN
COMMUNITY
Start: 2023-12-01

## 2023-12-07 RX ORDER — POLYETHYLENE GLYCOL 3350 17 G/17G
17 POWDER, FOR SOLUTION ORAL DAILY
COMMUNITY
Start: 2023-12-01

## 2023-12-07 RX ORDER — FLUTICASONE PROPIONATE 50 MCG
2 SPRAY, SUSPENSION (ML) NASAL DAILY
COMMUNITY
Start: 2023-12-01

## 2023-12-07 RX ORDER — CHLORHEXIDINE GLUCONATE ORAL RINSE 1.2 MG/ML
15 SOLUTION DENTAL ONCE
OUTPATIENT
Start: 2023-12-07 | End: 2023-12-07

## 2023-12-07 NOTE — PROGRESS NOTES
Assessment  Diagnoses and all orders for this visit:    Tear of right glenoid labrum, subsequent encounter      Discussion and Plan:    The patient has an examination and MRI arthrogram consistent with a posterior labral tear and may very well have an anterior inferior glenoid labral tear associated with a possible anterior instability event as evidenced by the Hill-Sachs deformity seen on MRI arthrogram.  This is consistent with his examination and clinical history of a traumatic injury and I do feel given his lack of improvement with nonoperative care including physical therapy since October and his inability to return to his preinjury level of function at work I do feel he is a candidate for arthroscopic repair of his posterior and possible anterior labral tear. These to be evaluated at the time of surgery and decisions would be made intraoperatively. After discussing these treatment options, the patient elected for right shoulder arthroscopic posterior labral repair and possible anterior labral repair. Patient may continue with his current work restriction which we have provided with regards to his right shoulder no pushing, pulling or lifting greater than 5 lbs right upper extremity. Subjective:   Patient ID: Deepthi Molina is a 28 y.o. male      HPI  Patient presents today to discuss the findings of his right shoulder MRI arthrogram. The pain began 3 month(s) ago and is associated with an acute injury. Patient reports a WC injury on 8/03/23. He reports he stepped on a nail causing him to twist his knee, fall and land on the right shoulder. He has been treating at [facility]. He was provided with right knee CS injection and referral to PT for the shoulder and knee. The patient describes the pain as aching, dull, and sharp in intensity,  constant in timing, and localizes the pain to the  right superior and anterior shoulder.   He reports constant right shoulder pain       The following portions of the patient's history were reviewed and updated as appropriate: allergies, current medications, past family history, past medical history, past social history, past surgical history and problem list.    Review of Systems   Constitutional:  Negative for chills and fever. HENT:  Negative for drooling and hearing loss. Eyes:  Negative for visual disturbance. Respiratory:  Negative for cough and shortness of breath. Cardiovascular:  Negative for chest pain. Gastrointestinal:  Negative for abdominal pain. Skin:  Negative for rash. Psychiatric/Behavioral:  Negative for agitation. Objective:  /73 (BP Location: Left arm, Patient Position: Sitting, Cuff Size: Standard)   Pulse (!) 51   Ht 6' (1.829 m)   Wt 68 kg (150 lb)   BMI 20.34 kg/m²     Right Shoulder Exam      Range of Motion   The patient has normal right shoulder ROM. Muscle Strength   Abduction: 4/5   Internal rotation: 5/5   External rotation: 5/5      Tests   Johnson test: positive  Cross arm: positive  Impingement: positive     Other   Erythema: absent  Sensation: normal  Pulse: present    Comments:   Positive O'omi's       Physical Exam  Vitals and nursing note reviewed. Constitutional:       Appearance: He is well-developed. HENT:      Head: Normocephalic and atraumatic. Eyes:      Pupils: Pupils are equal, round, and reactive to light. Cardiovascular:      Rate and Rhythm: Normal rate and regular rhythm. Pulses: Normal pulses. Heart sounds: Normal heart sounds. Pulmonary:      Effort: Pulmonary effort is normal. No respiratory distress. Breath sounds: Normal breath sounds. Abdominal:      General: Abdomen is flat. There is no distension. Palpations: Abdomen is soft. Musculoskeletal:      Cervical back: Normal range of motion and neck supple. Skin:     General: Skin is warm and dry. Neurological:      Mental Status: He is alert and oriented to person, place, and time. Psychiatric:         Mood and Affect: Mood normal.         Behavior: Behavior normal.           I have personally reviewed pertinent films in PACS and my interpretation is as follows. MRI arthrogram right shoulder demonstrates posterior labral tear with possible anterior labral tear.        Scribe Attestation      I,:  Moisés Munguia am acting as a scribe while in the presence of the attending physician.:       I,:  Simona Al MD personally performed the services described in this documentation    as scribed in my presence.:

## 2023-12-07 NOTE — LETTER
December 7, 2023     Patient: Yamile Silva  YOB: 1990  Date of Visit: 12/7/2023      To Whom it May Concern:    Yamile Silva is under my professional care. Britany Dominique was seen in my office on 12/7/2023. Britany Dominique may return to work with the following restrictions: no lifting, pushing or pulling more than 5 lbs with right upper extremity until next evaluation    If you have any questions or concerns, please don't hesitate to call.          Sincerely,          Shanika Watson MD        CC: No Recipients

## 2023-12-07 NOTE — H&P (VIEW-ONLY)
Assessment  Diagnoses and all orders for this visit:    Tear of right glenoid labrum, subsequent encounter      Discussion and Plan:    The patient has an examination and MRI arthrogram consistent with a posterior labral tear and may very well have an anterior inferior glenoid labral tear associated with a possible anterior instability event as evidenced by the Hill-Sachs deformity seen on MRI arthrogram.  This is consistent with his examination and clinical history of a traumatic injury and I do feel given his lack of improvement with nonoperative care including physical therapy since October and his inability to return to his preinjury level of function at work I do feel he is a candidate for arthroscopic repair of his posterior and possible anterior labral tear.  These to be evaluated at the time of surgery and decisions would be made intraoperatively.  After discussing these treatment options, the patient elected for right shoulder arthroscopic posterior labral repair and possible anterior labral repair.     Patient may continue with his current work restriction which we have provided with regards to his right shoulder no pushing, pulling or lifting greater than 5 lbs right upper extremity.     Subjective:   Patient ID: Shantanu Sebastian is a 33 y.o. male      HPI  Patient presents today to discuss the findings of his right shoulder MRI arthrogram. The pain began 3 month(s) ago and is associated with an acute injury.  Patient reports a WC injury on 8/03/23.  He reports he stepped on a nail causing him to twist his knee, fall and land on the right shoulder.  He has been treating at [facility]. He was provided with right knee CS injection and referral to PT for the shoulder and knee. The patient describes the pain as aching, dull, and sharp in intensity,  constant in timing, and localizes the pain to the  right superior and anterior shoulder.  He reports constant right shoulder pain       The following  portions of the patient's history were reviewed and updated as appropriate: allergies, current medications, past family history, past medical history, past social history, past surgical history and problem list.    Review of Systems   Constitutional:  Negative for chills and fever.   HENT:  Negative for drooling and hearing loss.    Eyes:  Negative for visual disturbance.   Respiratory:  Negative for cough and shortness of breath.    Cardiovascular:  Negative for chest pain.   Gastrointestinal:  Negative for abdominal pain.   Skin:  Negative for rash.   Psychiatric/Behavioral:  Negative for agitation.        Objective:  /73 (BP Location: Left arm, Patient Position: Sitting, Cuff Size: Standard)   Pulse (!) 51   Ht 6' (1.829 m)   Wt 68 kg (150 lb)   BMI 20.34 kg/m²     Right Shoulder Exam      Range of Motion   The patient has normal right shoulder ROM.     Muscle Strength   Abduction: 4/5   Internal rotation: 5/5   External rotation: 5/5      Tests   Johnson test: positive  Cross arm: positive  Impingement: positive     Other   Erythema: absent  Sensation: normal  Pulse: present    Comments:   Positive O'omi's       Physical Exam  Vitals and nursing note reviewed.   Constitutional:       Appearance: He is well-developed.   HENT:      Head: Normocephalic and atraumatic.   Eyes:      Pupils: Pupils are equal, round, and reactive to light.   Cardiovascular:      Rate and Rhythm: Normal rate and regular rhythm.      Pulses: Normal pulses.      Heart sounds: Normal heart sounds.   Pulmonary:      Effort: Pulmonary effort is normal. No respiratory distress.      Breath sounds: Normal breath sounds.   Abdominal:      General: Abdomen is flat. There is no distension.      Palpations: Abdomen is soft.   Musculoskeletal:      Cervical back: Normal range of motion and neck supple.   Skin:     General: Skin is warm and dry.   Neurological:      Mental Status: He is alert and oriented to person, place, and time.    Psychiatric:         Mood and Affect: Mood normal.         Behavior: Behavior normal.           I have personally reviewed pertinent films in PACS and my interpretation is as follows.  MRI arthrogram right shoulder demonstrates posterior labral tear with possible anterior labral tear.       Scribe Attestation      I,:  Manju Bowen am acting as a scribe while in the presence of the attending physician.:       I,:  Earl Easley MD personally performed the services described in this documentation    as scribed in my presence.:

## 2023-12-07 NOTE — PATIENT INSTRUCTIONS
You are being scheduled for a shoulder arthroscopy to treat your symptoms. Below are some instructions and information on what to expect before and after your surgery. Pre-Surgical Preparation for Arthroscopic Shoulder Surgery: You will be contacted the evening prior to your surgery to confirm the scheduled time of the procedure and when to arrive at the hospital.   Do not eat or drink anything after midnight the night before your surgery. Since you are having out-patient surgery, make sure that you have someone who can drive you home later in the day. Also, prepare that person for a long day, as the process of safely preparing for and recovering from the procedure is more time consuming than the actual procedure! As you will be in a sling after surgery, please wear or bring a loose fitting button-down shirt so that you can easily place this over the sling when you leave the surgical suite. This avoids having to place the operative arm in a sleeve. Most patients find that this is the easiest outfit to wear for the first week or so after surgery so you may want to plan accordingly. Most patients find that lying down in bed after shoulder surgery accentuates their discomfort. This is likely related to the effect of gravity on the swelling in the shoulder. As a result, most patients sleep better in a recliner or in bed with pillows propped up behind their back for the first few days or weeks after surgery. It is a good idea to plan for this ahead of time so there will be less hassle getting things set up the night after surgery. What to Expect After Arthroscopic Shoulder Surgery: It is normal to have swelling and discomfort in the shoulder for several days or a week after surgery. It is also normal to have a small amount of drainage from the surgical wounds (especially the first few days after surgery), as we put fluid into the shoulder to visualize the structures during surgery. It is NOT normal to have foul smelling, purulent drainage and if this is noted, please contact the office immediately or proceed to the emergency room for evaluation as this may indicate an infection. Applying ice bags to the shoulder may help with pain that is not controlled by the regional block. Ice should be applied 20-30 minutes at a time, every hour or two. Make sure to put a thin towel or T-shirt next to your skin to avoid direct contact of the ice with the skin. Icing is most helpful in the first 48 hours, although many people find that continuing past this time frame lessens their postoperative pain. Please note that your post-operative dressing is not conductive to ice, so if you need to, it is okay to remove that dressing even the night after surgery and place band-aids over the wounds in order for the ice to take effect. Pain Control    Most patients will receive a nerve block, the local anesthetic may keep your whole arm numb for up to 4 days. You will be given a prescription for narcotic pain medication when you are discharged from the hospital.  With the newer nerve block that is being utilized, patients are rarely requiring the use of this narcotic pain medication. If you find you do not tolerate that type of pain medicine well, call our office and we will try another one. In addition to the narcotic pain medication, it is safe to use an anti-inflammatory (unless the patient has a medical condition that would not allow safe use of this mediation). This includes the Advil, Motrin, Ibuprofen and Alleve category of medications. Simply follow the over the counter dosing on the package and use as indicated as another adjunct. Importantly since these medications are all very similar, use only one of them. Tylenol is a separate medication that can be utilized as well and can be taken at the same time as the other medication or given in a "staggered" manner.   Just make sure that you follow the dosing on the over the counter bottle instructions. Also make sure that the pain medication prescribed by Dr Mary Buck team does not contain acetaminophen (this is found in Percocet and Vicodin). Typically we do not prescribe those types of pain medications but if for some reason that has been prescribed DO NOT add more Tylenol (acetaminophen) as you could end up taking too much of that medication. As mentioned above, most patients find that lying down accentuates their discomfort. You might sleep better in a recliner, or propped up in bed. Dr. Quinn Ribeiro encourages patients to safely ambulate around the house as much as possible in the first few days after the procedure as this can help with blood circulation in the legs. While the incidence of blood clots is very rare following shoulder surgery, early ambulation is a great way to help decrease the already low rate. 24 hours after the surgery you may remove the bandage and cover incisions with Band-Aids if needed. At that time you may shower, the wounds will have a surgical glue that will protect them from shower water but do not submerge your incisions directly (bathing or swimming) until at least 2 weeks post-operatively. It is safe to let the arm hang at the side and take a shower and put on a shirt without the sling on. Just make sure that you do not use the operative are to reach out and grab anything as that may damage the repair. When you are done showering and getting dressed please return the operative arm to the sling. Unless noted otherwise in your discharge paperwork, Dr. Quinn Ribeiro uses absorbable sutures so they do not need to be removed. Dr. Martita Powers physician assistant (PA) will see you in the office a few days after the procedure to review the intra-operative findings and to initiate physical therapy if appropriate.   A post-operative appointment should have been scheduled for you already, but if for some reason this did not happen, please call the office to make one. Physical therapy is important after nearly all shoulder surgeries and a detailed rehabilitation plan based on the specific intra-operative findings and procedures will be provided to your therapist at the first post-operative office visit. Most patients have post-operative therapy appointments scheduled pre-operatively, but if you do not, that will be handled at the first post-operative office visit. Unless expressly directed otherwise it is safe to remove the sling even the first day after the surgery and let the arm hang by the side. This allows patients to shower and even put a shirt on (bad arm in the sleeve first). It is also safe to flex and extend their wrist, hand and fingers as much as possible when the block wears off. These simple motions can serve to pump fluid out of the forearm and decrease swelling in the arm.

## 2023-12-07 NOTE — LETTER
December 7, 2023     Patient: Sofia Ramey  YOB: 1990  Date of Visit: 12/7/2023      To Whom it May Concern:    Sofia Ramey is under my professional care. Danny White was seen in my office on 12/7/2023. Danny White may return to work in reference to the right shoulder, no pushing pull or lifting greater than 5 lbs until his surgery date at which time he will remain out of work. If you have any questions or concerns, please don't hesitate to call.          Sincerely,          Laverne Casey MD        CC: No Recipients

## 2023-12-10 ENCOUNTER — ANESTHESIA EVENT (OUTPATIENT)
Dept: ANESTHESIOLOGY | Facility: HOSPITAL | Age: 33
End: 2023-12-10

## 2023-12-10 ENCOUNTER — ANESTHESIA (OUTPATIENT)
Dept: ANESTHESIOLOGY | Facility: HOSPITAL | Age: 33
End: 2023-12-10

## 2023-12-13 NOTE — PRE-PROCEDURE INSTRUCTIONS
Pre-Surgery Instructions:   Medication Instructions    albuterol (PROVENTIL HFA,VENTOLIN HFA) 90 mcg/act inhaler Uses PRN- OK to take day of surgery    ibuprofen (MOTRIN) 600 mg tablet Stop taking 7 days prior to surgery.    Medication instructions for day surgery reviewed. Please use only a sip of water to take your instructed medications. Avoid all over the counter vitamins, supplements and NSAIDS for one week prior to surgery per anesthesia guidelines. Tylenol is ok to take as needed.     You will receive a call one business day prior to surgery with an arrival time and hospital directions. If your surgery is scheduled on a Monday, the hospital will be calling you on the Friday prior to your surgery. If you have not heard from anyone by 8pm, please call the hospital supervisor through the hospital  at [phone]. ([place] [phone]).    Do not eat or drink anything after midnight the night before your surgery, including candy, mints, lifesavers, or chewing gum. Do not drink alcohol 24hrs before your surgery. Try not to smoke at least 24hrs before your surgery.       Follow the pre surgery showering instructions as listed in the “My Surgical Experience Booklet” or otherwise provided by your surgeon's office. Do not use a blade to shave the surgical area 1 week before surgery. It is okay to use a clean electric clippers up to 24 hours before surgery. Do not apply any lotions, creams, including makeup, cologne, deodorant, or perfumes after showering on the day of your surgery. Do not use dry shampoo, hair spray, hair gel, or any type of hair products.     No contact lenses, eye make-up, or artificial eyelashes. Remove nail polish, including gel polish, and any artificial, gel, or acrylic nails if possible. Remove all jewelry including rings and body piercing jewelry.     Wear causal clothing that is easy to take on and off. Consider your type of surgery.    Keep any valuables, jewelry, piercings at  home. Please bring any specially ordered equipment (sling, braces) if indicated.    Arrange for a responsible person to drive you to and from the hospital on the day of your surgery. Visitor Guidelines discussed.     Call the surgeon's office with any new illnesses, exposures, or additional questions prior to surgery.    Please reference your “My Surgical Experience Booklet” for additional information to prepare for your upcoming surgery.    Pt instructed to stop nsaids and supplements one week prior to surgery.  Pt verbalized understanding of shower and med instructions.

## 2023-12-16 NOTE — ASSESSMENT & PLAN NOTE
2/2 traumatic work injury  Continues to suffer from daily HA, light sensitivity    Plan:  -pain control per other plans (scheduled tylenol, PRN oxy)  -neurology consulted for likely concussive symptoms  -cont steroids  -toradol, tylenol  -one time 2g IV mag + benadryl Per on call MD, still ok to give medications through PEG

## 2023-12-21 ENCOUNTER — ANESTHESIA EVENT (OUTPATIENT)
Dept: PERIOP | Facility: AMBULARY SURGERY CENTER | Age: 33
End: 2023-12-21
Payer: OTHER MISCELLANEOUS

## 2023-12-21 NOTE — ANESTHESIA PREPROCEDURE EVALUATION
Procedure:  SHOULDER ARTHROSCOPIC POSTERIOR LABRUM REPAIR, POSSIBLE ANTERIOR LABRUM REPAIR (Right: Shoulder)    Relevant Problems   MUSCULOSKELETAL   (+) Acute midline low back pain   (+) Back pain   (+) Chronic left-sided low back pain with left-sided sciatica      NEURO/PSYCH   (+) Chronic left-sided low back pain with left-sided sciatica   (+) Chronic pain syndrome      Daily smoker  Prior opioid addiction, per patient  Allergy to morphine - hives    Physical Exam    Airway    Mallampati score: II  TM Distance: >3 FB  Neck ROM: full     Dental   No notable dental hx     Cardiovascular  Cardiovascular exam normal    Pulmonary  Pulmonary exam normal     Other Findings        Anesthesia Plan  ASA Score- 2     Anesthesia Type- general with ASA Monitors.         Additional Monitors:     Airway Plan: LMA.    Comment: + PNB.       Plan Factors-    Chart reviewed.   Existing labs reviewed. Patient summary reviewed.                  Induction- intravenous.    Postoperative Plan-     Informed Consent- Anesthetic plan and risks discussed with patient.  I personally reviewed this patient with the CRNA. Discussed and agreed on the Anesthesia Plan with the CRNA..

## 2023-12-22 ENCOUNTER — ANESTHESIA (OUTPATIENT)
Dept: PERIOP | Facility: AMBULARY SURGERY CENTER | Age: 33
End: 2023-12-22
Payer: OTHER MISCELLANEOUS

## 2023-12-22 ENCOUNTER — HOSPITAL ENCOUNTER (OUTPATIENT)
Facility: AMBULARY SURGERY CENTER | Age: 33
Setting detail: OUTPATIENT SURGERY
Discharge: HOME | End: 2023-12-22
Attending: ORTHOPAEDIC SURGERY | Admitting: ORTHOPAEDIC SURGERY
Payer: OTHER MISCELLANEOUS

## 2023-12-22 VITALS
SYSTOLIC BLOOD PRESSURE: 128 MMHG | BODY MASS INDEX: 20.32 KG/M2 | HEART RATE: 69 BPM | HEIGHT: 72 IN | OXYGEN SATURATION: 97 % | TEMPERATURE: 97.1 F | DIASTOLIC BLOOD PRESSURE: 68 MMHG | WEIGHT: 150 LBS | RESPIRATION RATE: 16 BRPM

## 2023-12-22 PROCEDURE — C9290 INJ, BUPIVACAINE LIPOSOME: HCPCS | Performed by: ANESTHESIOLOGY

## 2023-12-22 PROCEDURE — C1713 ANCHOR/SCREW BN/BN,TIS/BN: HCPCS | Performed by: ORTHOPAEDIC SURGERY

## 2023-12-22 PROCEDURE — 29806 SHO ARTHRS SRG CAPSULORRAPHY: CPT | Performed by: PHYSICIAN ASSISTANT

## 2023-12-22 PROCEDURE — 29806 SHO ARTHRS SRG CAPSULORRAPHY: CPT | Performed by: ORTHOPAEDIC SURGERY

## 2023-12-22 DEVICE — FIBERTAK WITH SUTURETAPE
Type: IMPLANTABLE DEVICE | Site: SHOULDER | Status: FUNCTIONAL
Brand: ARTHREX®

## 2023-12-22 RX ORDER — DEXAMETHASONE SODIUM PHOSPHATE 10 MG/ML
INJECTION, SOLUTION INTRAMUSCULAR; INTRAVENOUS AS NEEDED
Status: DISCONTINUED | OUTPATIENT
Start: 2023-12-22 | End: 2023-12-22

## 2023-12-22 RX ORDER — ONDANSETRON 2 MG/ML
4 INJECTION INTRAMUSCULAR; INTRAVENOUS EVERY 6 HOURS PRN
Status: CANCELLED | OUTPATIENT
Start: 2023-12-22

## 2023-12-22 RX ORDER — BUPIVACAINE HYDROCHLORIDE 5 MG/ML
INJECTION, SOLUTION EPIDURAL; INTRACAUDAL AS NEEDED
Status: DISCONTINUED | OUTPATIENT
Start: 2023-12-22 | End: 2023-12-22

## 2023-12-22 RX ORDER — SODIUM CHLORIDE, SODIUM LACTATE, POTASSIUM CHLORIDE, CALCIUM CHLORIDE 600; 310; 30; 20 MG/100ML; MG/100ML; MG/100ML; MG/100ML
INJECTION, SOLUTION INTRAVENOUS CONTINUOUS PRN
Status: DISCONTINUED | OUTPATIENT
Start: 2023-12-22 | End: 2023-12-22

## 2023-12-22 RX ORDER — ONDANSETRON 2 MG/ML
4 INJECTION INTRAMUSCULAR; INTRAVENOUS ONCE AS NEEDED
Status: COMPLETED | OUTPATIENT
Start: 2023-12-22 | End: 2023-12-22

## 2023-12-22 RX ORDER — CHLORHEXIDINE GLUCONATE ORAL RINSE 1.2 MG/ML
15 SOLUTION DENTAL ONCE
Status: DISCONTINUED | OUTPATIENT
Start: 2023-12-22 | End: 2023-12-22 | Stop reason: HOSPADM

## 2023-12-22 RX ORDER — PROPOFOL 10 MG/ML
INJECTION, EMULSION INTRAVENOUS AS NEEDED
Status: DISCONTINUED | OUTPATIENT
Start: 2023-12-22 | End: 2023-12-22

## 2023-12-22 RX ORDER — CEFAZOLIN SODIUM 2 G/50ML
2000 SOLUTION INTRAVENOUS ONCE
Status: COMPLETED | OUTPATIENT
Start: 2023-12-22 | End: 2023-12-22

## 2023-12-22 RX ORDER — GLYCOPYRROLATE 0.2 MG/ML
INJECTION INTRAMUSCULAR; INTRAVENOUS AS NEEDED
Status: DISCONTINUED | OUTPATIENT
Start: 2023-12-22 | End: 2023-12-22

## 2023-12-22 RX ORDER — FENTANYL CITRATE 50 UG/ML
INJECTION, SOLUTION INTRAMUSCULAR; INTRAVENOUS AS NEEDED
Status: DISCONTINUED | OUTPATIENT
Start: 2023-12-22 | End: 2023-12-22

## 2023-12-22 RX ORDER — EPHEDRINE SULFATE 50 MG/ML
INJECTION INTRAVENOUS AS NEEDED
Status: DISCONTINUED | OUTPATIENT
Start: 2023-12-22 | End: 2023-12-22

## 2023-12-22 RX ORDER — ONDANSETRON 2 MG/ML
INJECTION INTRAMUSCULAR; INTRAVENOUS AS NEEDED
Status: DISCONTINUED | OUTPATIENT
Start: 2023-12-22 | End: 2023-12-22

## 2023-12-22 RX ORDER — ACETAMINOPHEN 325 MG/1
650 TABLET ORAL EVERY 6 HOURS PRN
Status: CANCELLED | OUTPATIENT
Start: 2023-12-22

## 2023-12-22 RX ORDER — MIDAZOLAM HYDROCHLORIDE 2 MG/2ML
INJECTION, SOLUTION INTRAMUSCULAR; INTRAVENOUS AS NEEDED
Status: DISCONTINUED | OUTPATIENT
Start: 2023-12-22 | End: 2023-12-22

## 2023-12-22 RX ORDER — FENTANYL CITRATE/PF 50 MCG/ML
25 SYRINGE (ML) INJECTION
Status: DISCONTINUED | OUTPATIENT
Start: 2023-12-22 | End: 2023-12-22 | Stop reason: HOSPADM

## 2023-12-22 RX ADMIN — GLYCOPYRROLATE 0.4 MCG: 0.2 INJECTION INTRAMUSCULAR; INTRAVENOUS at 08:59

## 2023-12-22 RX ADMIN — EPHEDRINE SULFATE 5 MG: 50 INJECTION INTRAVENOUS at 09:19

## 2023-12-22 RX ADMIN — BUPIVACAINE 20 ML: 13.3 INJECTION, SUSPENSION, LIPOSOMAL INFILTRATION at 07:59

## 2023-12-22 RX ADMIN — CEFAZOLIN SODIUM 2000 MG: 2 SOLUTION INTRAVENOUS at 08:55

## 2023-12-22 RX ADMIN — PROPOFOL 200 MG: 10 INJECTION, EMULSION INTRAVENOUS at 08:53

## 2023-12-22 RX ADMIN — DEXAMETHASONE SODIUM PHOSPHATE 10 MG: 10 INJECTION, SOLUTION INTRAMUSCULAR; INTRAVENOUS at 08:53

## 2023-12-22 RX ADMIN — DEXMEDETOMIDINE HYDROCHLORIDE 12 MCG: 100 INJECTION, SOLUTION INTRAVENOUS at 08:17

## 2023-12-22 RX ADMIN — ONDANSETRON 4 MG: 2 INJECTION INTRAMUSCULAR; INTRAVENOUS at 08:53

## 2023-12-22 RX ADMIN — BUPIVACAINE HYDROCHLORIDE 10 ML: 5 INJECTION, SOLUTION EPIDURAL; INTRACAUDAL at 07:59

## 2023-12-22 RX ADMIN — SODIUM CHLORIDE, SODIUM LACTATE, POTASSIUM CHLORIDE, AND CALCIUM CHLORIDE: .6; .31; .03; .02 INJECTION, SOLUTION INTRAVENOUS at 08:49

## 2023-12-22 RX ADMIN — EPHEDRINE SULFATE 5 MG: 50 INJECTION INTRAVENOUS at 09:05

## 2023-12-22 RX ADMIN — MIDAZOLAM 2 MG: 1 INJECTION INTRAMUSCULAR; INTRAVENOUS at 07:50

## 2023-12-22 RX ADMIN — ONDANSETRON 4 MG: 2 INJECTION INTRAMUSCULAR; INTRAVENOUS at 10:19

## 2023-12-22 RX ADMIN — FENTANYL CITRATE 100 MCG: 50 INJECTION INTRAMUSCULAR; INTRAVENOUS at 07:50

## 2023-12-22 NOTE — DISCHARGE INSTR - AVS FIRST PAGE
You are being scheduled for a shoulder arthroscopy to treat your symptoms.  Below are some instructions and information on what to expect before and after your surgery.        Pre-Surgical Preparation for Arthroscopic Shoulder Surgery:     You will be contacted the evening prior to your surgery to confirm the scheduled time of the procedure and when to arrive at the hospital.   Do not eat or drink anything after midnight the night before your surgery.  Since you are having out-patient surgery, make sure that you have someone who can drive you home later in the day.  Also, prepare that person for a long day, as the process of safely preparing for and recovering from the procedure is more time consuming than the actual procedure!      As you will be in a sling after surgery, please wear or bring a loose fitting button-down shirt so that you can easily place this over the sling when you leave the surgical suite.  This avoids having to place the operative arm in a sleeve.  Most patients find that this is the easiest outfit to wear for the first week or so after surgery so you may want to plan accordingly.    Most patients find that lying down in bed after shoulder surgery accentuates their discomfort.  This is likely related to the effect of gravity on the swelling in the shoulder.  As a result, most patients sleep better in a recliner or in bed with pillows propped up behind their back for the first few days or weeks after surgery.  It is a good idea to plan for this ahead of time so there will be less hassle getting things set up the night after surgery.       What to Expect After Arthroscopic Shoulder Surgery:    It is normal to have swelling and discomfort in the shoulder for several days or a week after surgery.  It is also normal to have a small amount of drainage from the surgical wounds (especially the first few days after surgery), as we put fluid into the shoulder to visualize the structures during surgery.   "It is NOT normal to have foul smelling, purulent drainage and if this is noted, please contact the office immediately or proceed to the emergency room for evaluation as this may indicate an infection.     Applying ice bags to the shoulder may help with pain that is not controlled by the regional block.  Ice should be applied 20-30 minutes at a time, every hour or two.  Make sure to put a thin towel or T-shirt next to your skin to avoid direct contact of the ice with the skin. Icing is most helpful in the first 48 hours, although many people find that continuing past this time frame lessens their postoperative pain.  Please note that your post-operative dressing is not conductive to ice, so if you need to, it is okay to remove that dressing even the night after surgery and place band-aids over the wounds in order for the ice to take effect.     Pain Control    Most patients will receive a nerve block, the local anesthetic may keep your whole arm numb for up to 4 days.  You will be given a prescription for narcotic pain medication when you are discharged from the hospital.  With the newer nerve block that is being utilized, patients are rarely requiring the use of this narcotic pain medication.  If you find you do not tolerate that type of pain medicine well, call our office and we will try another one.     In addition to the narcotic pain medication, it is safe to use an anti-inflammatory (unless the patient has a medical condition that would not allow safe use of this mediation).  This includes the Advil, Motrin, Ibuprofen and Alleve category of medications.  Simply follow the over the counter dosing on the package and use as indicated as another adjunct.  Importantly since these medications are all very similar, use only one of them.  Tylenol is a separate medication that can be utilized as well and can be taken at the same time as the other medication or given in a \"staggered\" manner.  Just make sure that you " follow the dosing on the over the counter bottle instructions.  Also make sure that the pain medication prescribed by Dr Easley's team does not contain acetaminophen (this is found in Percocet and Vicodin).   Typically we do not prescribe those types of pain medications but if for some reason that has been prescribed DO NOT add more Tylenol (acetaminophen) as you could end up taking too much of that medication.    As mentioned above, most patients find that lying down accentuates their discomfort. You might sleep better in a recliner, or propped up in bed.     Dr. Easley encourages patients to safely ambulate around the house as much as possible in the first few days after the procedure as this can help with blood circulation in the legs. While the incidence of blood clots is very rare following shoulder surgery, early ambulation is a great way to help decrease the already low rate.    24 hours after the surgery you may remove the bandage and cover incisions with Band-Aids if needed. At that time you may shower, the wounds will have a surgical glue that will protect them from shower water but do not submerge your incisions directly (bathing or swimming) until at least 2 weeks post-operatively.  It is safe to let the arm hang at the side and take a shower and put on a shirt without the sling on.  Just make sure that you do not use the operative are to reach out and grab anything as that may damage the repair.  When you are done showering and getting dressed please return the operative arm to the sling.    Unless noted otherwise in your discharge paperwork, Dr. Easley uses absorbable sutures so they do not need to be removed.    Dr. Easley’s physician assistant (PA) will see you in the office a few days after the procedure to review the intra-operative findings and to initiate physical therapy if appropriate.  A post-operative appointment should have been scheduled for you already, but if for some reason this did  not happen, please call the office to make one.     Physical therapy is important after nearly all shoulder surgeries and a detailed rehabilitation plan based on the specific intra-operative findings and procedures will be provided to your therapist at the first post-operative office visit.  Most patients have post-operative therapy appointments scheduled pre-operatively, but if you do not, that will be handled at the first post-operative office visit.  Unless expressly directed otherwise it is safe to remove the sling even the first day after the surgery and let the arm hang by the side.  This allows patients to shower and even put a shirt on (bad arm in the sleeve first).  It is also safe to flex and extend their wrist, hand and fingers as much as possible when the block wears off.  These simple motions can serve to pump fluid out of the forearm and decrease swelling in the arm.

## 2023-12-22 NOTE — ANESTHESIA POSTPROCEDURE EVALUATION
Post-Op Assessment Note    CV Status:  Stable  Pain Score: 0    Pain management: adequate       Mental Status:  Sleepy   Hydration Status:  Stable   PONV Controlled:  None   Airway Patency:  Patent     Post Op Vitals Reviewed: Yes      Staff: CRNA               /62 (12/22/23 0941)    Temp (!) 97.2 °F (36.2 °C) (12/22/23 0941)    Pulse 76 (12/22/23 0941)   Resp 18 (12/22/23 0941)    SpO2 99 % (12/22/23 0941)

## 2023-12-22 NOTE — INTERVAL H&P NOTE
H&P reviewed. After examining the patient I find no changes in the patients condition since the H&P had been written.    Vitals:    12/22/23 0701   BP: 124/75   Pulse: (!) 54   Resp: 16   Temp: 97.9 °F (36.6 °C)   SpO2: 99%

## 2023-12-22 NOTE — OP NOTE
OPERATIVE REPORT  PATIENT NAME: Shantanu Sebastian    :  1990  MRN: [id number]  Pt Location: [facility] OR [facility]OM 05    SURGERY DATE: 2023     SURGEON: Earl Easley MD     ASSISTANT: Eliane Uriarte PA-C     NOTE: Eliane Uriarte PA-C was present throughout the entire procedure and performed essential assistance with patient prepping, draping, positioning, suture management, wound closure, sterile dressing application and sling application, all under my direct supervision.     NOTE: No qualified resident physician was available for assistance    PREOPERATIVE DIAGNOSIS:  Right Shoulder Anterior-Inferior Glenoid Labrum Tear (Bankart Tear) and Posterior Glenoid Labrum Tear    POSTOPERATIVE DIAGNOSIS: Same    PROCEDURES: Surgical Arthroscopy Right Shoulder with Anterior-Inferior Glenoid Labrum (Bankart) Repair and Extensive Debridement    ANESTHESIA STAFF: Ross Krause MD     ANESTHESIA TYPE: General LMA with ultrasound guided interscalene block (Exparel). The interscalene block was provided by the anesthesia staff per my request for postoperative pain control and to decrease the use of postoperative narcotic medication for pain control.    COMPLICATIONS: None    FINDINGS: Anterior-Inferior Glenoid Labrum Tear (Bankart Tear) and Non-Engaging Hill Sachs, Scuffing of Posterior Labrum and Undersurface of Supraspinatus    SPECIMEN(S):  None    ESTIMATED BLOOD LOSS: Minimal    INDICATION:  Briefly, the patient is a 33 y.o.  male with right shoulder pain following a suspected instability event. MRI arthrogram scan suggested a Hill-Sachs deformity with a possible posterior labral tear and no clear evidence of an anterior inferior glenoid labral tear. The patient continues to be symptomatic despite physical therapy and activity modification so given the findings of the MRI and suspected anterior instability event patient elected for arthroscopic treatment. Informed consent was obtained after a thorough discussion of  the risks and benefits of the procedure, as well as alternatives to the procedure.     OPERATIVE TECHNIQUE:  On the day of surgery, I identified the patient’s right shoulder and marked it with my initials. The patient was taken to the operating room where anesthesia was induced and 2 grams of IV Cefazolin were given. The patient was examined in the supine position and was found to have full range of motion of the right shoulder with isolated anterior instability by load and shift testing. The patient was then positioned in the semilateral [facility] chair position. All bony prominences were padded. The shoulder was prepped and draped in normal sterile fashion. After a time-out for safety, a standard posterior arthroscopic portal was made. Glenohumeral evaluation revealed early degenerative changes of the humeral head and glenoid with thinning of the humeral head articular cartilage.  No loose bodies were seen.  There was a small nonengaging Hill-Sachs deformity of the posterior superior humeral head as well as a corresponding Bankart lesion of the anterior inferior glenoid labrum as clinically suspected but not well-imaged on the MRI arthrogram.  The posterior labrum did have scuffing from the instability event and had a small flap but was not detached from the glenoid.  The superior labrum was intact and the undersurface of the supraspinatus did have undersurface fraying consistent with his instability event, the infraspinatus and subscapularis were intact and the long head of biceps seen exiting normally.  An anterior cannula was established and extensive debridement of the glenohumeral joint was performed including the undersurface of the supraspinatus, the posterior labrum flap, the humeral head articular cartilage changes as well as the exposed humeral bone and the Hill-Sachs deformity and the glenoid articular cartilage to a stable border.   After performing the extensive debridement, a Bankart repair was  indicated given his intraoperative findings consistent with an anterior instability event.  At the anterior inferior labrum tear site, the labrum was mobilized by elevation and the glenoid bone was debrided to allow for healing.   Using two Arthrex All Suture FiberTak anchors, the anterior inferior labrum tear was repaired to the glenoid with anatomic tension to the anterior band of the infra glenohumeral ligament and this did allow the humeral head to be centered in the glenoid vault.  Following repair the Hill-Sachs deformity was confirmed to be not engaging so a remplissage was not indicated.  After repairing the anterior-inferior labrum no further pathology was seen so the area was then irrigated. Scope was withdrawn. Wounds were closed with 4-0 Monocryl and Histoacryl. Sterile dressings and a sling with an abduction pillow was placed. The patient was awoken without complication and returned to the recovery room in good condition. We will see the patient back in the office next week to initiate therapy following Bankart repair rehabilitation protocol. At the end of procedure, the counts were correct.     PATIENT DISPOSITION:  Stable to PACU      SIGNATURE: Earl Easley MD  DATE: December 22, 2023  TIME: 9:37 AM

## 2023-12-22 NOTE — ANESTHESIA PROCEDURE NOTES
Peripheral Block    Patient location during procedure: holding area  Start time: 12/22/2023 7:59 AM  Reason for block: at surgeon's request and post-op pain management  Staffing  Performed by: Ross rKause MD  Authorized by: Ross Krause MD    Preanesthetic Checklist  Completed: patient identified, IV checked, site marked, risks and benefits discussed, surgical consent, monitors and equipment checked, pre-op evaluation and timeout performed  Peripheral Block  Patient position: sitting  Prep: ChloraPrep  Patient monitoring: frequent blood pressure checks, continuous pulse oximetry and heart rate  Block type: Interscalene  Laterality: right  Injection technique: single-shot  Procedures: ultrasound guided, Ultrasound guidance required for the procedure to increase accuracy and safety of medication placement and decrease risk of complications.  Ultrasound permanent image saved  Needle  Needle type: Stimuplex   Needle gauge: 20 G  Needle length: 4 in  Needle localization: anatomical landmarks and ultrasound guidance  Assessment  Injection assessment: incremental injection, frequent aspiration, injected with ease, negative aspiration, negative for heart rate change, no paresthesia on injection, no symptoms of intraneural/intravenous injection and needle tip visualized at all times  Paresthesia pain: none  Post-procedure:  site cleaned  patient tolerated the procedure well with no immediate complications

## 2023-12-26 ENCOUNTER — TELEPHONE (OUTPATIENT)
Age: 33
End: 2023-12-26

## 2023-12-26 NOTE — TELEPHONE ENCOUNTER
Caller: patient    Doctor: Kaushal    Reason for call: When should the Patient start PT  Please advise    Call back#: [phone]

## 2023-12-26 NOTE — TELEPHONE ENCOUNTER
I spoke with patient to let him know post op therapy should begin today, states he will try to set up appt today if not he will start tomorrow. Any further questions he will reach out to the office.

## 2023-12-26 NOTE — TELEPHONE ENCOUNTER
Patient made an appointment with Jimenez Patton on Wed 11/27/23 at 10:00 PM. Offered an earlier time but patient has PT and per Jimenez Patton to do PT and then come for the eval after. Patient coming from [facility].

## 2023-12-27 ENCOUNTER — EVALUATION (OUTPATIENT)
Dept: PHYSICAL THERAPY | Facility: CLINIC | Age: 33
End: 2023-12-27
Payer: OTHER MISCELLANEOUS

## 2023-12-27 DIAGNOSIS — S43.431D TEAR OF RIGHT GLENOID LABRUM, SUBSEQUENT ENCOUNTER: ICD-10-CM

## 2023-12-27 PROCEDURE — 97161 PT EVAL LOW COMPLEX 20 MIN: CPT | Performed by: PHYSICAL THERAPIST

## 2023-12-27 PROCEDURE — 97110 THERAPEUTIC EXERCISES: CPT | Performed by: PHYSICAL THERAPIST

## 2023-12-27 NOTE — PROGRESS NOTES
PT Evaluation     Today's date: 2023  Patient name: Shantanu Sebastian  : 1990  MRN: [id number]  Referring provider: Earl Easley*  Dx:   Encounter Diagnosis     ICD-10-CM    1. Tear of right glenoid labrum, subsequent encounter  S43.431D Ambulatory Referral to Physical Therapy                     Assessment  Assessment details: Patient is a 34 y/o male s/p R Bankhart repair on 23. Patient presents with decreased functional mobility due to increased pain, decreased strength, decreased ROM associated with aforementioned repair.  Patient will benefit from skilled physical therapy to address impairment and improve functional mobility.  PT needed to allow for return to maximal function and improve quality of life.   Impairments: abnormal or restricted ROM, activity intolerance, impaired physical strength, lacks appropriate home exercise program and pain with function  Understanding of Dx/Px/POC: good   Prognosis: good    Goals  STG within 4 weeks:   1. Patient to be independent in HEP.   2. Reduce pain by 50% to improve quality of life.   3. Improve PROM flexion to 90*.   LTG within 8 weeks:   1. Patient to be independent in ADLs/IADLs without difficulty.  2. Full PROM per protocol.   3. At least 4/5 strength by 12 weeks.     Plan  Plan details: Patient instructed in post op restrictions and instructed not to perform shoulder AROM.     Patient instructed to call physician's office to obtain new waist strap for abduction pillow or discuss how long he should be in abduction pillow, as he is not using it at present.     Discussed protocol with patient.   Patient would benefit from: skilled physical therapy and PT eval  Planned modality interventions: cryotherapy, hydrotherapy and unattended electrical stimulation  Planned therapy interventions: therapeutic training, therapeutic exercise, therapeutic activities, stretching, strengthening, postural training, patient education, neuromuscular  "re-education, manual therapy, joint mobilization, IADL retraining, activity modification, ADL retraining, ADL training, body mechanics training, flexibility, functional ROM exercises, gait training, graded activity, graded exercise, graded motor and home exercise program  Frequency: 2x week  Duration in weeks: 12  Plan of Care beginning date: 2023  Plan of Care expiration date: 3/20/2024  Treatment plan discussed with: patient    Subjective Evaluation    History of Present Illness  Mechanism of injury: Patient is a 34 y/o male s/p a fall where he injured his shoulder. He was seen for conservative management of PT, but ultimately, still had pain and was referred for surgery.  He had Bankart repair on 23. He presents today for post op evaluation in sling, but no abduction pillow, stating he doesn't have the strap to go around his waist. He states his hand was numb until recently and now he's able to move his shoulder all around.  He states he has a history of carpal tunnel with numbness within first 3 digits.    Patient Goals  Patient goal: \"To get the movement and strength back.\"  Pain  At best pain ratin  At worst pain rating: 10  Exacerbated by: lying on back, getting dressed, everything.    Social Support    Employment status: not working ()  Hand dominance: right    Treatments  Previous treatment: physical therapy    Objective     Active Range of Motion   Left Shoulder   Flexion: 130 degrees   Extension: 65 degrees   Abduction: 110 degrees   External rotation 0°: 85 degrees   Internal rotation BTB: T7     Additional Active Range of Motion Details  Right shoulder AROM NOT TESTED 2* to post op restrictions     Passive Range of Motion     Right Shoulder   Flexion: 50 degrees     Strength/Myotome Testing     Left Shoulder   Normal muscle strength    Additional Strength Details  Right shoulder STRENGTH NOT TESTED 2* to post op restrictions            Precautions: asthma, history of chronic right " shoulder pain, recent R labral repair       Manuals 12/27                                                                 Neuro Re-Ed             Scap pinch  X20                                                                                           Ther Ex             Pt Edu  KS            R Shld PROM- flex < 90*              Hand   HEP             AAROM elbow flexion  2x10             Finger to thumb  HEP             Wrist flex/ext  HEP                                      Ther Activity                                       Gait Training                                       Modalities

## 2023-12-29 ENCOUNTER — OFFICE VISIT (OUTPATIENT)
Dept: OBGYN CLINIC | Facility: CLINIC | Age: 33
End: 2023-12-29
Payer: OTHER MISCELLANEOUS

## 2023-12-29 VITALS
WEIGHT: 148 LBS | HEART RATE: 80 BPM | HEIGHT: 72 IN | BODY MASS INDEX: 20.05 KG/M2 | DIASTOLIC BLOOD PRESSURE: 73 MMHG | SYSTOLIC BLOOD PRESSURE: 114 MMHG

## 2023-12-29 DIAGNOSIS — Z98.890 HISTORY OF LUMBOSACRAL SPINE SURGERY: ICD-10-CM

## 2023-12-29 DIAGNOSIS — G89.29 CHRONIC PAIN OF RIGHT KNEE: Primary | ICD-10-CM

## 2023-12-29 DIAGNOSIS — M25.561 CHRONIC PAIN OF RIGHT KNEE: Primary | ICD-10-CM

## 2023-12-29 PROCEDURE — 99213 OFFICE O/P EST LOW 20 MIN: CPT | Performed by: ORTHOPAEDIC SURGERY

## 2023-12-29 NOTE — PROGRESS NOTES
"REASON FOR FOLLOW-UP  Shantanu Sebastian is a 33 y.o. male who presents for follow-up of the right knee pain    HISTORY OF PRESENT ILLNESS  Following our last visit, we decided to initially treat his Knee symptoms non-operatively. Our plan was to manage his symptoms conservatively RICE and anti-inflammatories. Patient reports he is still having pain in the right knee. Patient describes his pain anterior around the patella. He has been doing PT which isn't helping. He had a corticosteroid injection which did not help. Patient denies any new injuries since August 2023. Of note, patient reports lumbar back surgery in 2018. He denies back pain.    HPI from 12/1/23: \"Shantanu Sebastian is a 33 y.o. male who presents for evaluation of his right knee. Referred by Dr Easley  Patient states he had an injury at work on 8/3/2023 when he stepped on a nail causing him to twist his knee and land on his Right shoulder. He is a . He is being treated by Dr Easley for his shoulder injury.  Had a knee injection at [facility] and had attended Physical Therapy for approx 3 months. Patient states the injection did not seem to alleviate any of his pain nor did Physical Therapy  Patient states WB causes sharp throbbing pain in his knee. Rest alleviates\"      PHYSICAL EXAMINATION    Musculoskeletal: right Knee Examination:  General: The patient is alert, oriented, and pleasant to interact with.  Patient ambulates with Normal gait pattern  Assistive Device: No        DIAGNOSTIC IMAGING:        IMPRESSION/REPORT/PLAN  Nonspecific right knee pain versus lumbar radiculopathy    Given patient history, exam findings, and diagnostic imaging reports, he continues to experience chronic nonspecific right knee pain.  Given that he experienced no relief from corticosteroid injection and his MRI is relatively within normal limits, discussed with the patient that his pain is likely not coming from the right knee.  Given his history of lumbosacral " spine surgery, we did discuss the possibility that his pain is being referred from his lower back.  Recommended following up with spine and pain management which he is agreeable to.  Referral placed.  Recommended RICE and anti-inflammatories as needed.  We will plan to see the patient back on an as-needed basis.  Patient is understanding and agreeable to plan.  He is to call with any other questions or concerns.      Scribe Attestation      I,:  Diaz Oliver PA-C am acting as a scribe while in the presence of the attending physician.:       I,:  Lester Longoria MD personally performed the services described in this documentation    as scribed in my presence.:

## 2024-01-02 ENCOUNTER — APPOINTMENT (OUTPATIENT)
Dept: PHYSICAL THERAPY | Facility: CLINIC | Age: 34
End: 2024-01-02
Payer: OTHER MISCELLANEOUS

## 2024-01-03 ENCOUNTER — TELEPHONE (OUTPATIENT)
Age: 34
End: 2024-01-03

## 2024-01-03 NOTE — TELEPHONE ENCOUNTER
Caller: Sofia Lang Nurse      Doctor: Kaushal    Reason for call: Wanted to know if doctor would speak with a  after the appt, if patient does not want her in the room.    Call back#: [phone]

## 2024-01-04 ENCOUNTER — OFFICE VISIT (OUTPATIENT)
Dept: OBGYN CLINIC | Facility: OTHER | Age: 34
End: 2024-01-04

## 2024-01-04 ENCOUNTER — TELEPHONE (OUTPATIENT)
Age: 34
End: 2024-01-04

## 2024-01-04 ENCOUNTER — OFFICE VISIT (OUTPATIENT)
Dept: PHYSICAL THERAPY | Facility: CLINIC | Age: 34
End: 2024-01-04
Payer: OTHER MISCELLANEOUS

## 2024-01-04 VITALS
SYSTOLIC BLOOD PRESSURE: 125 MMHG | DIASTOLIC BLOOD PRESSURE: 79 MMHG | HEART RATE: 58 BPM | BODY MASS INDEX: 20.04 KG/M2 | HEIGHT: 72 IN | WEIGHT: 147.93 LBS

## 2024-01-04 DIAGNOSIS — S43.431D TEAR OF RIGHT GLENOID LABRUM, SUBSEQUENT ENCOUNTER: Primary | ICD-10-CM

## 2024-01-04 PROCEDURE — 99024 POSTOP FOLLOW-UP VISIT: CPT | Performed by: ORTHOPAEDIC SURGERY

## 2024-01-04 PROCEDURE — 97110 THERAPEUTIC EXERCISES: CPT | Performed by: PHYSICAL THERAPIST

## 2024-01-04 PROCEDURE — 97014 ELECTRIC STIMULATION THERAPY: CPT | Performed by: PHYSICAL THERAPIST

## 2024-01-04 NOTE — PROGRESS NOTES
Daily Note     Today's date: 2024  Patient name: Shantanu Sebastian  : 1990  MRN: [id number]  Referring provider: Earl Easley*  Dx:   Encounter Diagnosis     ICD-10-CM    1. Tear of right glenoid labrum, subsequent encounter  S43.431D                      Subjective: Patient states he did get the abduction pillow, but then he saw the surgeon today and was instructed to take off the abduction pillow.  He states he hasn't done any of his exercises that he was given at .  He reports pain when he tries to lie supine.        Objective: See treatment diary below  Supine passive flexion: 65 degrees       Assessment: Tolerated treatment fair.  Treatment session follows protocol per physician.  He achieves 65* of passive flexion ROM. Added pendulums this visit and added to HEP.  Patient would benefit from continued PT      Plan: Continue per plan of care.      Precautions: asthma, history of chronic right shoulder pain, recent R labral repair     POC expires Unit limit Auth Expiration date PT/OT/ST + Visit Limit?   3/20/24 BOMN 3/1/24 30 visits                            Visit/Unit Tracking  AUTH Status:  Date              30 visits Used 1 2              Remaining  29 28                   FOLLOW BANKART PROTOCOL  Manuals            Light Scap ROM   Trial; pain                                                     Neuro Re-Ed             Scap pinch  X20  X20                                                                                          Ther Ex             Pt Edu  KS KS           R Shld PROM- flex < 90*  KS  KS           Hand   HEP             AAROM elbow flexion  2x10  2x10            Finger to thumb  HEP             Wrist flex/ext  HEP Decline            Elbow PROM- flex/ext   KS           Pendulums- side/side, fwd/bwd, cw/ccw   X20 ea            Ther Activity                                       Gait Training                                       Modalities                TENS- MWR- R shld   8 min            CP- R shld   8 min

## 2024-01-04 NOTE — LETTER
January 4, 2024     Patient: Shantanu Sebastian  YOB: 1990  Date of Visit: 1/4/2024      To Whom it May Concern:    Shantanu Sebastian is under my professional care. Shantanu was seen in my office on 1/4/2024. In regards to his right shoulder only, he has a restriction of no use of his right upper extremity until his next follow up visit in 2 months.    If you have any questions or concerns, please don't hesitate to call.         Sincerely,          Earl Easley MD        CC: No Recipients

## 2024-01-04 NOTE — TELEPHONE ENCOUNTER
Caller: Janice Ace    Doctor/Office: Will    CB#:n/a      What needs to be faxed: 12/22 OVN    ATTN to: Malka     Fax#: [phone]      Documents were successfully e-faxed

## 2024-01-04 NOTE — TELEPHONE ENCOUNTER
We will not.  We routinely have this issue.  Whether or not the  is in the room is up to the patient.  We will provide them with the office note and work note.  We will not see the  after the appointment.

## 2024-01-04 NOTE — PROGRESS NOTES
Assessment:  1. Tear of right glenoid labrum, subsequent encounter s/p repair              Surgery: Shoulder Arthroscopic Bankart  Repair, Extensive Debridement - Right  Date of Surgery: 12/22/2023        Discussion and Plan:   Instructed to continue formal PT/HEP as per post operative protocol. Provided for the patient today  Continue use of sling for 1 more week then may discontinue.  Work restriction with regards to his right shoulder for which we are caring of no use of the right upper extremity for 2 months. Note was provided  He will be seeing Dr Parra at [facility] for his right carpal tunnel symptoms, which were present pre operatively and certainly may require surgical intervention, the patient does state that now that his shoulder is feeling better he is certainly feeling the symptoms in his right hand including numbness and tingling in the median distribution worse that the distracting pain is gone.  Clearly I would defer treatment of his median nerve compression to Dr. Parra as that is not something that I routinely manage clinically    Follow Up:  Return in about 2 months (around 3/4/2024)        CHIEF COMPLAINT:  Chief Complaint   Patient presents with    Right Shoulder - Post-op         SUBJECTIVE:  Shantanu Sebastian is a 33 y.o. year old male who presents for follow up after Shoulder Arthroscopic Bankart  Repair, Extensive Debridement - Right. Today patient has no complaints. His pain is well controlled. He has only been to formal PT once but does continue to perform HEP. He has been complaint with the use of his sling as instructed. He does report a numbness and tingling in his hand which was pre existing before the surgical procedure.  No fevers or chills.        PHYSICAL EXAMINATION:  General: well developed and well nourished, alert, oriented times 3, and appears comfortable  Psychiatric: Normal    MUSCULOSKELETAL EXAMINATION:  Incision: Clean, dry, intact  Surgery Site: normal,  no evidence of infection   Range of Motion: As expected  Neurovascular status: Neuro intact, good cap refill  Activity Restrictions: Restrictions: sling restrictions      Scribe Attestation      I,:  Shantanu Flores am acting as a scribe while in the presence of the attending physician.:       I,:  Earl Easley MD personally performed the services described in this documentation    as scribed in my presence.:

## 2024-01-05 ENCOUNTER — OFFICE VISIT (OUTPATIENT)
Dept: PAIN MEDICINE | Facility: CLINIC | Age: 34
End: 2024-01-05
Payer: OTHER MISCELLANEOUS

## 2024-01-05 VITALS
SYSTOLIC BLOOD PRESSURE: 121 MMHG | HEIGHT: 72 IN | BODY MASS INDEX: 20.3 KG/M2 | DIASTOLIC BLOOD PRESSURE: 78 MMHG | WEIGHT: 149.9 LBS | HEART RATE: 59 BPM

## 2024-01-05 DIAGNOSIS — M25.561 CHRONIC PAIN OF RIGHT KNEE: ICD-10-CM

## 2024-01-05 DIAGNOSIS — G89.29 CHRONIC PAIN OF RIGHT KNEE: ICD-10-CM

## 2024-01-05 DIAGNOSIS — G89.4 CHRONIC PAIN SYNDROME: ICD-10-CM

## 2024-01-05 DIAGNOSIS — Z98.890 HISTORY OF LUMBOSACRAL SPINE SURGERY: ICD-10-CM

## 2024-01-05 DIAGNOSIS — M79.2 NEUROPATHIC PAIN: Primary | ICD-10-CM

## 2024-01-05 DIAGNOSIS — M96.1 LUMBAR POST-LAMINECTOMY SYNDROME: ICD-10-CM

## 2024-01-05 PROCEDURE — 99214 OFFICE O/P EST MOD 30 MIN: CPT | Performed by: STUDENT IN AN ORGANIZED HEALTH CARE EDUCATION/TRAINING PROGRAM

## 2024-01-05 NOTE — TELEPHONE ENCOUNTER
Called Sofia, , @ # provided  No answer  LVM letting her know that unfortunately doc would not be able to stop and speak with her as we have patient's he needs to continue seeing.   We can fax most recent office visit note and work status note.     Office Visit note and work status note have been faxed to fax# that was provided on [employer]: [phone]    Confirmation that fax went through was received

## 2024-01-05 NOTE — PROGRESS NOTES
"Assessment:  1. Neuropathic pain    2. History of lumbosacral spine surgery    3. Chronic pain of right knee    4. Lumbar post-laminectomy syndrome    5. Chronic pain syndrome        Portions of the record may have been created with voice recognition software. Occasional wrong word or \"sound a like\" substitutions may have occurred due to the inherent limitations of voice recognition software. Read the chart carefully and recognize, using context, where substitutions have occurred. Contact me with any questions.        Plan:  33-year-old male with past medical history of lumbar laminectomy (2018) here for a follow-up visit.  Patient was previously under the care of Dr. Thayer for chronic left-sided low back pain with radiation into LLE and previously underwent [place] and SIJ injection w/ variable relief, last seen on 6/2/22. Returns today to discuss new symptoms in RLE since Aug '23 which started after a fall at work. Patient denies significant low back pain at this time, notes symptoms are distal to R knee over later aspect of lower leg. He was previously evaluated by Dr. Longoria for these symptoms and underwent right knee corticosteroid injection at [facility] without significant relief.  He has also undergone physical therapy for the symptoms without significant improvement.  He is also dealing with other issues related to this fall in August including shoulder pain for which he is being treated by Dr. Easley.  Patient notes difficulty with ambulation due to symptoms but denies progressive weakness, saddle anesthesia, bowel or incontinence.    Obtain BLE EMG for further evaluation of RLE symptoms.    Continue home exercise program to optimize function and ambulation in the setting of pain symptoms.      History of Present Illness:  The patient is a 33 y.o. male who presents for a follow up office visit in regards to Leg Pain and Foot Pain.   The patient’s current symptoms include RLE pain distal to R " knee.  Denies low back pain, new or progressive weakness, saddle anesthesia, bowel/bladder incontinence.  Patient notes he has been managing symptoms with OTC analgesics with limited relief.    I have personally reviewed and/or updated the patient's past medical history, past surgical history, family history, social history, current medications, allergies, and vital signs today.         Review of Systems  Review of Systems   Eyes:  Negative for visual disturbance.   Respiratory:  Positive for shortness of breath. Negative for wheezing.    Cardiovascular:  Negative for chest pain and palpitations.   Gastrointestinal:  Positive for nausea. Negative for constipation.   Endocrine: Negative for polydipsia.   Musculoskeletal:  Positive for gait problem. Negative for joint swelling and myalgias.        Decreased range of motion  Paralysis or muscle weakness  Joint stiffness  Swelling  Pain in extremity   Skin:  Negative for rash.   Neurological:  Positive for dizziness. Negative for headaches.   Psychiatric/Behavioral:  Negative for decreased concentration.          Past Medical History:   Diagnosis Date    Asthma     Chronic back pain        Past Surgical History:   Procedure Laterality Date    BACK SURGERY  2019    lumbar    DISCECTOMY SPINE LUMBAR W/ ARTHROPLASTY  02/2019    L5-S1    FL INJECTION LEFT SHOULDER (ARTHROGRAM)  6/15/2022    ORTHOPEDIC SURGERY      PA SURGICAL ARTHROSCOPY SHOULDER CAPSULORRHAPHY Right 12/22/2023    Procedure: SHOULDER ARTHROSCOPIC BANKART  REPAIR, EXTENSIVE DEBRIDEMENT;  Surgeon: Earl Easley MD;  Location: [facility];  Service: Orthopedics    SHOULDER SURGERY Right     2019       Family History   Problem Relation Age of Onset    No Known Problems Mother     No Known Problems Father        Social History     Occupational History    Not on file   Tobacco Use    Smoking status: Every Day     Current packs/day: 0.25     Average packs/day: 0.3 packs/day for 22.1 years (5.5 ttl  pk-yrs)     Types: Cigarettes     Start date: 12/12/2001    Smokeless tobacco: Never   Vaping Use    Vaping status: Never Used   Substance and Sexual Activity    Alcohol use: Never    Drug use: Never    Sexual activity: Not on file         Current Outpatient Medications:     albuterol (PROVENTIL HFA,VENTOLIN HFA) 90 mcg/act inhaler, Inhale 2 puffs every 6 (six) hours as needed, Disp: , Rfl:     ibuprofen (MOTRIN) 600 mg tablet, every 4 (four) hours as needed, Disp: , Rfl:     Allergies   Allergen Reactions    Morphine Hives and Wheezing       Physical Exam:    /78   Pulse 59   Ht 6' (1.829 m)   Wt 68 kg (149 lb 14.4 oz)   BMI 20.33 kg/m²     Constitutional:normal, well developed, well nourished, alert, in no distress and non-toxic and no overt pain behavior.  Eyes:anicteric  HEENT:grossly intact  Neck:supple, symmetric, trachea midline and no masses   Pulmonary:even and unlabored  Cardiovascular:No edema or pitting edema present  Skin:Normal without rashes or lesions and well hydrated  Psychiatric:Mood and affect appropriate  Neurologic:Cranial Nerves II-XII grossly intact  Musculoskeletal:normal gait, grossly intact strength in BLE    Imaging    Narrative & Impression   MRI LUMBAR SPINE WITH AND WITHOUT CONTRAST     INDICATION: trauma one month ago to the head, now with dullness to pinprick, perianal numbness, urinary incontinence, +left matos.     COMPARISON:  3/10/2022, 4/17/2022 and 4/14/2022.     TECHNIQUE:  Sagittal T1, sagittal T2, sagittal inversion recovery, axial T1 and axial T2, coronal T2.  Sagittal and axial T1 postcontrast.     IV Contrast:  7 mL of Gadobutrol injection (SINGLE-DOSE)      IMAGE QUALITY:  Diagnostic     FINDINGS:     VERTEBRAL BODIES:  There are 5 lumbar type vertebral bodies.  Stable straightening of lumbar lordosis.     SACRUM:  Unremarkable.     DISTAL CORD AND CONUS:  Normal signal morphology of the distal thoracic cord and conus, terminating at L1.     PARASPINAL SOFT  TISSUES:  No mass or fluid collection.     LOWER THORACIC DISC SPACES:  Unremarkable.     LUMBAR DISC SPACES:     L1-L2:  Normal.     L2-L3:  Normal.     L3-L4:  Normal.     L4-L5:  Stable mild posterior disc bulge and annular tear.  No central canal stenosis.  Mild facet arthropathy.  No neural foraminal narrowing.     L5-S1:  Stable posterior disc bulge and annular tear.  Superimposed paracentral disc protrusion is stable.  No central canal stenosis or neural foraminal narrowing.     POSTCONTRAST IMAGING:  No leptomeningeal or cauda equina abnormal enhancement.     IMPRESSION:     Stable exam.  No abnormal leptomeningeal or cauda equina enhancement.       R knee MRI 8/24/23:        Orders Placed This Encounter   Procedures    EMG 2 limb lower extremity

## 2024-01-08 ENCOUNTER — OFFICE VISIT (OUTPATIENT)
Dept: PHYSICAL THERAPY | Facility: CLINIC | Age: 34
End: 2024-01-08
Payer: OTHER MISCELLANEOUS

## 2024-01-08 DIAGNOSIS — S43.431D TEAR OF RIGHT GLENOID LABRUM, SUBSEQUENT ENCOUNTER: Primary | ICD-10-CM

## 2024-01-08 PROCEDURE — 97112 NEUROMUSCULAR REEDUCATION: CPT | Performed by: PHYSICAL THERAPIST

## 2024-01-08 PROCEDURE — 97110 THERAPEUTIC EXERCISES: CPT | Performed by: PHYSICAL THERAPIST

## 2024-01-08 NOTE — PROGRESS NOTES
Daily Note     Today's date: 2024  Patient name: Shantanu Sebastian  : 1990  MRN: [id number]  Referring provider: Earl Easley*  Dx:   Encounter Diagnosis     ICD-10-CM    1. Tear of right glenoid labrum, subsequent encounter  S43.431D                      Subjective: Pt reports that he is feeling improved ROM and less pain since last week. He has been completing the HEP as prescribed and he feels that this is helping. Pt does report ongoing posterior shoulder pain along the posterior incision. He states that it was oozing pus last week. He reports his cousin, who is an RN, cleaned it and he reports it no longer is discharging fluid.      Objective: See treatment diary below      Assessment: Tolerated treatment well. Visualized posterior incision, no signs of infection as color is normal, no discharge noted, good healing. Pt having much less pain compared to previous visit. Able to tolerate PROM in supine. Flex to ~80d, ER to 30d, Abd to ~80d. He still does have some isolated pain along the incision line in the posterior portal. Much improved tolerance to pendulums. Patient would benefit from continued PT      Plan: Continue per plan of care.  Progress treatment as tolerated.       Precautions: asthma, history of chronic right shoulder pain, recent R labral repair     POC expires Unit limit Auth Expiration date PT/OT/ST + Visit Limit?   3/20/24 BOMN 3/1/24 30 visits                            Visit/Unit Tracking  AUTH Status:  Date              30 visits Used 1 2              Remaining  29 28                   FOLLOW BANKART PROTOCOL  Manuals           Light Scap ROM   Trial; pain                                                     Neuro Re-Ed             Scap pinch  X20  X20  x20          Education   Donning/doffing clothing                                                                           Ther Ex             Pt Edu  KS KS MICHELLE- staying within protocol          R Shld  PROM- flex < 90*  KS  KS MICHELLE- flex/abd/ER          Hand   HEP             AAROM elbow flexion  2x10  2x10            Finger to thumb  HEP             Wrist flex/ext  HEP Decline            Elbow PROM- flex/ext   KS           Pendulums- side/side, fwd/bwd, cw/ccw   X20 ea  X20 ea          Ther Activity                                       Gait Training                                       Modalities               TENS- MWR- R shld   8 min            CP- R shld   8 min

## 2024-01-11 ENCOUNTER — OFFICE VISIT (OUTPATIENT)
Dept: PHYSICAL THERAPY | Facility: CLINIC | Age: 34
End: 2024-01-11
Payer: OTHER MISCELLANEOUS

## 2024-01-11 DIAGNOSIS — S43.431D TEAR OF RIGHT GLENOID LABRUM, SUBSEQUENT ENCOUNTER: Primary | ICD-10-CM

## 2024-01-11 PROCEDURE — 97112 NEUROMUSCULAR REEDUCATION: CPT

## 2024-01-11 PROCEDURE — 97110 THERAPEUTIC EXERCISES: CPT

## 2024-01-11 NOTE — PROGRESS NOTES
Daily Note     Today's date: 2024  Patient name: Shantanu Sebastian  : 1990  MRN: [id number]  Referring provider: Earl Easley*  Dx:   Encounter Diagnosis     ICD-10-CM    1. Tear of right glenoid labrum, subsequent encounter  S43.431D                      Subjective: Pt reports his overall pain is improving but he still experiences it in posterior aspect of R shoulder.  Pt reports continued difficulty with donning/doffing clothing.  He states his R shoulder feels unstable when her performs ER.  Patient also reports he tripped on a toy and fell on his R elbow yesterday and has increased soreness.  Pt states he called his surgeon's office and they told him to continue with PT if he did not have sharp shooting pains.  Pt expressed this after initiation of PROM.      Objective: See treatment diary below      Assessment: Empty end feel during PROM secondary to pain and protocol limitations.  Pain in anterior shoulder is present.  Continued to educate patient on protocol limitations and importance of remaining in sling except for exercises only.  Pt demonstrates verbal understanding.  Also advised patient to contact surgeon if sx continue to worsen.  He demonstrates verbal understanding.      Plan: Continue per plan of care.      Precautions: asthma, history of chronic right shoulder pain, recent R labral repair     POC expires Unit limit Auth Expiration date PT/OT/ST + Visit Limit?   3/20/24 BOMN 3/1/24 30 visits                            Visit/Unit Tracking  AUTH Status:  Date             30 visits Used 1 2 3             Remaining  29 28 27                  FOLLOW BANKART PROTOCOL  Manuals          Light Scap ROM   Trial; pain                                                     Neuro Re-Ed             Scap pinch  X20  X20  x20 x20         Education   Donning/doffing clothing AF                                                                          Ther Ex              Pt Edu  KS KS MICHELLE- staying within protocol AF - protocol limitations         R Shld PROM- flex < 90*  KS  KS MICHELLE- flex/abd/ER AF - flex/abd-ER         Hand   HEP             AAROM elbow flexion  2x10  2x10            Finger to thumb  HEP             Wrist flex/ext  HEP Decline            Elbow PROM- flex/ext   KS           Pendulums- side/side, fwd/bwd, cw/ccw   X20 ea  X20 ea          Ther Activity                                       Gait Training                                       Modalities               TENS- MWR- R shld   8 min            CP- R shld   8 min

## 2024-01-18 ENCOUNTER — APPOINTMENT (OUTPATIENT)
Dept: PHYSICAL THERAPY | Facility: CLINIC | Age: 34
End: 2024-01-18
Payer: OTHER MISCELLANEOUS

## 2024-01-19 ENCOUNTER — OFFICE VISIT (OUTPATIENT)
Dept: PHYSICAL THERAPY | Facility: CLINIC | Age: 34
End: 2024-01-19
Payer: OTHER MISCELLANEOUS

## 2024-01-19 DIAGNOSIS — S43.431D TEAR OF RIGHT GLENOID LABRUM, SUBSEQUENT ENCOUNTER: Primary | ICD-10-CM

## 2024-01-19 PROCEDURE — 97140 MANUAL THERAPY 1/> REGIONS: CPT

## 2024-01-19 PROCEDURE — 97110 THERAPEUTIC EXERCISES: CPT

## 2024-01-19 PROCEDURE — 97112 NEUROMUSCULAR REEDUCATION: CPT

## 2024-01-19 NOTE — PROGRESS NOTES
Daily Note    Today's date: 24  Patient name: Shantanu Sebastian  : 1990  MRN: [id number]  Referring provider: Earl Easley*  Dx:   Encounter Diagnosis     ICD-10-CM    1. Tear of right glenoid labrum, subsequent encounter  S43.431D           Start Time: 730  Stop Time: 0815  Total time in clinic (min): 45 minutes      Subjective: Shantanu reports for therapy today after switching from a different clinic due to transportation restrictions. He notes that he has been wearing his sling at all times. He notes pain with laying down and with showering. He notes adherence to HEP and has been doing pendulums and scap squeezes.    Objective: See treatment diary below. PROM to 90 flexion, 60 abduction with empty end feel.    Assessment: Shantanu tolerated treatment fair with consistent cuing throughout. He had significant pain during PROM and had to stop. TE's were performed with increased reps. New TE's were demonstrated with proper technique, and tolerated fair. Following treatment, the patient demonstrated fatigue post treatment, exhibited good technique with therapeutic exercises, and would benefit from continued PT. Pt was educated that per MD note on , he is able to take his sling off and wear it for comfort only at this time.    Plan: Continue per plan of care.  Progress treament per protocol.        Precautions: asthma, history of chronic right shoulder pain, recent R labral repair     POC expires Unit limit Auth Expiration date PT/OT/ST + Visit Limit?   3/20/24 BOMN 3/1/24 30 visits                            Visit/Unit Tracking  AUTH Status:  Date            30 visits Used 1 2 3 4            Remaining  29 28 27 26                 FOLLOW BANKART PROTOCOL  Manuals         Light Scap ROM   Trial; pain            R shoulder PROM per protocol     TS - empty end feel                                    Neuro Re-Ed             Scap pinch  X20  X20  x20 x20 X20    "     Education   Donning/doffing clothing AF         Shrug hold and relax     15x5\"                                                            Ther Ex             Pt Edu  KS KS MICHELLE- staying within protocol AF - protocol limitations Sling removal - TS        R Shld PROM- flex < 90*  KS  KS MICHELLE- flex/abd/ER AF - flex/abd-ER         Hand   HEP     Green x50        AAROM elbow flexion  2x10  2x10    x40        Finger to thumb  HEP     x40        Wrist flex/ext  HEP Decline    X25 ea        Elbow PROM- flex/ext   KS   4x10        Pendulums- side/side, fwd/bwd, cw/ccw   X20 ea  X20 ea  X 20 ea        Ther Activity                                       Gait Training                                       Modalities               TENS- MWR- R shld   8 min            CP- R shld   8 min                 Terry Wang, PT  1/19/2024,8:26 AM  "

## 2024-01-22 ENCOUNTER — OFFICE VISIT (OUTPATIENT)
Dept: PHYSICAL THERAPY | Facility: CLINIC | Age: 34
End: 2024-01-22
Payer: OTHER MISCELLANEOUS

## 2024-01-22 ENCOUNTER — APPOINTMENT (OUTPATIENT)
Dept: PHYSICAL THERAPY | Facility: CLINIC | Age: 34
End: 2024-01-22
Payer: OTHER MISCELLANEOUS

## 2024-01-22 DIAGNOSIS — S43.431D TEAR OF RIGHT GLENOID LABRUM, SUBSEQUENT ENCOUNTER: Primary | ICD-10-CM

## 2024-01-22 PROCEDURE — 97140 MANUAL THERAPY 1/> REGIONS: CPT

## 2024-01-22 PROCEDURE — 97110 THERAPEUTIC EXERCISES: CPT

## 2024-01-22 PROCEDURE — 97112 NEUROMUSCULAR REEDUCATION: CPT

## 2024-01-22 NOTE — PROGRESS NOTES
"Daily Note    Today's date: 24  Patient name: Shantanu Sebastian  : 1990  MRN: [id number]  Referring provider: Earl Easley*  Dx:   Encounter Diagnosis     ICD-10-CM    1. Tear of right glenoid labrum, subsequent encounter  S43.431D           Start Time: 1045  Stop Time: 1130  Total time in clinic (min): 45 minutes      Subjective: Shantanu reports that he has tried going without his sling for a few days. He notes that he had some pain with motion, but it is getting better as time goes on. He reporst adherence to HEP.    Objective: See treatment diary below.    Assessment: Shantanu tolerated treatment well with consistent cuing throughout. TE's were performed with increased reps and increased resistance. No new TE's were performed today. Following treatment, the patient demonstrated fatigue post treatment, exhibited good technique with therapeutic exercises, and would benefit from continued PT.     Plan: Continue per plan of care.  Progress treament per protocol.        Precautions: asthma, history of chronic right shoulder pain, recent R labral repair     POC expires Unit limit Auth Expiration date PT/OT/ST + Visit Limit?   3/20/24 BOMN 3/1/24 30 visits                            Visit/Unit Tracking  AUTH Status:  Date           30 visits Used 1 2 3 4 5           Remaining  29 28 27 26 25                FOLLOW [employer] PROTOCOL  Manuals        Light Scap ROM   Trial; pain     TS       R shoulder PROM per protocol     TS - empty end feel TS                                   Neuro Re-Ed             Scap pinch  X20  X20  x20 x20 X20 20x5\"        Education   Donning/doffing clothing AF         Shrug hold and relax     15x5\"                                                             Ther Ex             Pt Edu  KS KS MICHELLE- staying within protocol AF - protocol limitations Sling removal - TS        R Shld PROM- flex < 90*  KS  KS MICHELLE- flex/abd/ER AF - " flex/abd-ER         Hand   HEP     Green x50 Blue x50       AAROM elbow flexion  2x10  2x10    x40        Finger to thumb  HEP     x40        Wrist flex/ext  HEP Decline    X25 ea        Elbow PROM- flex/ext   KS   4x10        Pendulums- side/side, fwd/bwd, cw/ccw   X20 ea  X20 ea  X 20 ea X30 ea       Ther Activity                                       Gait Training                                       Modalities               TENS- MWR- R shld   8 min            CP- R shld   8 min                   Terry Wang, PT  1/22/2024,11:45 AM

## 2024-01-24 ENCOUNTER — APPOINTMENT (OUTPATIENT)
Dept: PHYSICAL THERAPY | Facility: CLINIC | Age: 34
End: 2024-01-24
Payer: OTHER MISCELLANEOUS

## 2024-01-25 ENCOUNTER — OFFICE VISIT (OUTPATIENT)
Dept: PHYSICAL THERAPY | Facility: CLINIC | Age: 34
End: 2024-01-25
Payer: OTHER MISCELLANEOUS

## 2024-01-25 DIAGNOSIS — S43.431D TEAR OF RIGHT GLENOID LABRUM, SUBSEQUENT ENCOUNTER: Primary | ICD-10-CM

## 2024-01-25 PROCEDURE — 97140 MANUAL THERAPY 1/> REGIONS: CPT

## 2024-01-25 PROCEDURE — 97112 NEUROMUSCULAR REEDUCATION: CPT

## 2024-01-25 PROCEDURE — 97110 THERAPEUTIC EXERCISES: CPT

## 2024-01-25 NOTE — PROGRESS NOTES
"Daily Note    Today's date: 24  Patient name: Shantanu Sebastian  : 1990  MRN: [id number]  Referring provider: Earl Easley*  Dx:   Encounter Diagnosis     ICD-10-CM    1. Tear of right glenoid labrum, subsequent encounter  S43.431D           Start Time: 1415  Stop Time: 1500  Total time in clinic (min): 45 minutes      Subjective: Shantanu reports adherence to HEP. He notes that he has been moving his arm little by little. He is still using his sling when he leaves the house.    Objective: See treatment diary below.    Assessment: Shantanu tolerated treatment well with consistent cuing throughout. TE's were performed with increased reps and increased resistance. New TE's were demonstrated with proper technique, and tolerated well. Following treatment, the patient demonstrated fatigue post treatment, exhibited good technique with therapeutic exercises, and would benefit from continued PT.     Plan: Continue per plan of care.  Progress treament per protocol.        Precautions: asthma, history of chronic right shoulder pain, recent R labral repair     POC expires Unit limit Auth Expiration date PT/OT/ST + Visit Limit?   3/20/24 BOMN 3/1/24 30 visits                            Visit/Unit Tracking  AUTH Status:  Date          30 visits Used 1 2 3 4 5 6          Remaining  29 28 27 26 25 24               FOLLOW [employer] PROTOCOL  Manuals       Light Scap ROM   Trial; pain     TS TS      R shoulder PROM per protocol     TS - empty end feel TS TS                                   Neuro Re-Ed             Scap pinch  X20  X20  x20 x20 X20 20x5\"  20x5\"       Education   Donning/doffing clothing AF         Shrug hold and relax     15x5\"                                                             Ther Ex             Pt Edu  KS KS MICHELLE- staying within protocol AF - protocol limitations Sling removal - TS  TS - prognosis, healing factors      R Shld " "PROM- flex, scap, ab < 90*  KS  KS MICHELLE- flex/abd/ER AF - flex/abd-ER   Table slides 20x5\"       Hand   HEP     Green x50 Blue x50       AAROM elbow flexion  2x10  2x10    x40        Finger to thumb  HEP     x40        Wrist flex/ext  HEP Decline    X25 ea        Elbow PROM- flex/ext   KS   4x10        Pendulums- side/side, fwd/bwd, cw/ccw   X20 ea  X20 ea  X 20 ea X30 ea                    Submaximal isometrics       20x5\" 3 way      Ther Activity                                       Gait Training                                       Modalities               TENS- MWR- R shld   8 min            CP- R shld   8 min                     Terry Wang, PT  1/25/2024,3:13 PM  "

## 2024-01-29 ENCOUNTER — APPOINTMENT (OUTPATIENT)
Dept: PHYSICAL THERAPY | Facility: CLINIC | Age: 34
End: 2024-01-29
Payer: OTHER MISCELLANEOUS

## 2024-01-31 ENCOUNTER — APPOINTMENT (OUTPATIENT)
Dept: PHYSICAL THERAPY | Facility: CLINIC | Age: 34
End: 2024-01-31
Payer: OTHER MISCELLANEOUS

## 2024-02-01 ENCOUNTER — APPOINTMENT (OUTPATIENT)
Dept: PHYSICAL THERAPY | Facility: CLINIC | Age: 34
End: 2024-02-01
Payer: OTHER MISCELLANEOUS

## 2024-02-02 ENCOUNTER — OFFICE VISIT (OUTPATIENT)
Dept: PHYSICAL THERAPY | Facility: CLINIC | Age: 34
End: 2024-02-02
Payer: OTHER MISCELLANEOUS

## 2024-02-02 DIAGNOSIS — S43.431D TEAR OF RIGHT GLENOID LABRUM, SUBSEQUENT ENCOUNTER: Primary | ICD-10-CM

## 2024-02-02 PROCEDURE — 97140 MANUAL THERAPY 1/> REGIONS: CPT

## 2024-02-02 PROCEDURE — 97110 THERAPEUTIC EXERCISES: CPT

## 2024-02-02 NOTE — PROGRESS NOTES
"Daily Note    Today's date: 24  Patient name: Shantanu Sebastian  : 1990  MRN: [id number]  Referring provider: Earl Easley*  Dx:   Encounter Diagnosis     ICD-10-CM    1. Tear of right glenoid labrum, subsequent encounter  S43.431D                        Subjective: Shantanu reports adherence to HEP. He is moving his arm more at home for light activities and feels that it is improving.    Objective: See treatment diary below.    Assessment: Shantanu tolerated treatment well with consistent cuing throughout. TE's were performed with increased reps and increased resistance. New TE's were demonstrated with proper technique, and tolerated well. Following treatment, the patient demonstrated fatigue post treatment, exhibited good technique with therapeutic exercises, and would benefit from continued PT.     Plan: Continue per plan of care.        Precautions: asthma, history of chronic right shoulder pain, recent R labral repair     POC expires Unit limit Auth Expiration date PT/OT/ST + Visit Limit?   3/20/24 BOMN 3/1/24 30 visits                            Visit/Unit Tracking  AUTH Status:  Date  2/2        30 visits Used 1 2 3 4 5 6 7         Remaining  29 28 27 26 25 24 23              FOLLOW [facility] PROTOCOL    Access Code: [email or web address]  URL: [email or web address]  Date: 2024  Prepared by: Terry Wang    Manuals  1 2/2     Light Scap ROM   Trial; pain     TS TS TS     R shoulder PROM per protocol     TS - empty end feel TS TS  TS                                 Neuro Re-Ed             Scap pinch  X20  X20  x20 x20 X20 20x5\"  20x5\"       Education   Donning/doffing clothing AF         Shrug hold and relax     15x5\"                      Webslide M, L        3x10 RTB                               Ther Ex             Pt Edu  KS KS MICHELLE- staying within protocol AF - protocol limitations Sling removal - TS  TS - prognosis, healing " "factors TS - precautions, progressions, HEP      R Shld PROM- flex, scap, ab < 90*  KS  KS MICHELLE- flex/abd/ER AF - flex/abd-ER   Table slides 20x5\"       Hand   HEP     Green x50 Blue x50       AAROM elbow flexion  2x10  2x10    x40        Finger to thumb  HEP     x40        Wrist flex/ext  HEP Decline    X25 ea        Elbow PROM- flex/ext   KS   4x10        Pendulums- side/side, fwd/bwd, cw/ccw   X20 ea  X20 ea  X 20 ea X30 ea       TB IR        3x10 RTB     TB ER        3x10 RTB                  Submaximal isometrics       20x5\" 3 way      Ther Activity                                       Gait Training                                       Modalities               TENS- MWR- R shld   8 min            CP- R shld   8 min                       Terry Wang, PT  2/2/2024,12:42 PM  "

## 2024-02-06 ENCOUNTER — EVALUATION (OUTPATIENT)
Dept: PHYSICAL THERAPY | Facility: CLINIC | Age: 34
End: 2024-02-06
Payer: OTHER MISCELLANEOUS

## 2024-02-06 DIAGNOSIS — S43.431D TEAR OF RIGHT GLENOID LABRUM, SUBSEQUENT ENCOUNTER: Primary | ICD-10-CM

## 2024-02-06 PROCEDURE — 97110 THERAPEUTIC EXERCISES: CPT

## 2024-02-06 PROCEDURE — 97140 MANUAL THERAPY 1/> REGIONS: CPT

## 2024-02-06 NOTE — PROGRESS NOTES
PT Evaluation     Today's date: 2024  Patient name: Shantanu Sebastian  : 1990  MRN: [id number]  Referring provider: Earl Easley*  Dx:   Encounter Diagnosis     ICD-10-CM    1. Tear of right glenoid labrum, subsequent encounter  S43.431D           Start Time: 1415  Stop Time: 1435  Total time in clinic (min): 20 minutes    Assessment  Assessment details: Shantanu Sebastian is a pleasant 33 y.o. male who presents today for a re-evaluation of his R shoulder Bank[employer] repair . Shantanu complains of aching pain in the right arm, neck, and shoulder ranging from 0/10 to 10/10. Pain is exacerbated by activity and made better by medication or rest.    The patient demonstrates significantly decreased strength during resisted muscle testing, which has improved from initial evaluation. Pt ROM is moderately decreased with empty end feel.    The patient has difficulty with transfers, dressing, leisure, athletics, and work. Patient will benefit from continued skilled physical therapy, including therapeutic exercise, stretching, manual therapy, and modalities prn to improve their level of function, to increase overall quality of life, and to address his impairments.    Shantanu has met some of his goals at this time. Pt was instructed on his updated plan of care and wishes to continue therapy.    Impairments: abnormal or restricted ROM, activity intolerance, impaired physical strength, lacks appropriate home exercise program and pain with function  Understanding of Dx/Px/POC: good   Prognosis: good    Goals  STG within 4 weeks:   1. Patient to be independent in HEP. - GOAL PROGRESSING   2. Reduce pain by 50% to improve quality of life. - GOAL MET   3. Improve PROM flexion to 90*. - GOAL MET   LTG within 8 weeks:   1. Patient to be independent in ADLs/IADLs without difficulty. - GOAL PROGRESSING   2. Full PROM per protocol. - GOAL PROGRESSING   3. At least 4/5 strength by 12 weeks. - GOAL PROGRESSING     Plan  Patient would  "benefit from: skilled physical therapy and PT eval  Planned modality interventions: cryotherapy, hydrotherapy and unattended electrical stimulation  Planned therapy interventions: therapeutic training, therapeutic exercise, therapeutic activities, stretching, strengthening, postural training, patient education, neuromuscular re-education, manual therapy, joint mobilization, IADL retraining, activity modification, ADL retraining, ADL training, body mechanics training, flexibility, functional ROM exercises, gait training, graded activity, graded exercise, graded motor and home exercise program  Frequency: 2x week  Duration in weeks: 12  Plan of Care beginning date: 2024  Plan of Care expiration date: 3/20/2024  Treatment plan discussed with: patient      Subjective Evaluation    History of Present Illness  Mechanism of injury: Shantanu presents for re-eval following Bankart repair on 23.     He is no longer wearing a sling at this time. He notes that he was able to achieve full PROM recently and is feeling better as times goes on.     He is getting an EMG for his neck issues related to the fall. He would like to continue with therapy.   Patient Goals  Patient goal: \"To get the movement and strength back.\"  Pain  Current pain ratin  At best pain ratin  At worst pain ratin  Exacerbated by: lying on back, getting dressed, everything.    Social Support    Employment status: not working ()  Hand dominance: right    Treatments  Previous treatment: physical therapy      Objective     Active Range of Motion   Left Shoulder   Flexion: 130 degrees   Extension: 65 degrees   Abduction: 110 degrees   External rotation 0°: 85 degrees   Internal rotation BTB: T7     Right Shoulder   Flexion: 90 degrees   Abduction: 90 degrees     Passive Range of Motion     Right Shoulder   Flexion: WFL  Abduction: WFL  External rotation 0°: WFL    Strength/Myotome Testing     Left Shoulder   Normal muscle " "strength    Additional Strength Details  Strength not tested at this time. Patient had to leave appointment early.             Precautions: asthma, history of chronic right shoulder pain, recent R labral repair     POC expires Unit limit Auth Expiration date PT/OT/ST + Visit Limit?   3/20/24 BOMN 3/1/24 30 visits                            Visit/Unit Tracking  AUTH Status:  Date 12/27 1/4 1/11 1/18 1/22 1/25 2/2        30 visits Used 1 2 3 4 5 6 7         Remaining  29 28 27 26 25 24 23              FOLLOW [facility] PROTOCOL    Access Code: [email or web address]  URL: [email or web address]  Date: 02/02/2024  Prepared by: Terry Wang    Manuals 12/27 1/4 1/8 1/11 1/18 1/22 1/25 2/2 2/6    Light Scap ROM   Trial; pain     TS TS TS     R shoulder PROM per protocol     TS - empty end feel TS TS  TS                    Re-eval         TS    Neuro Re-Ed             Scap pinch  X20  X20  x20 x20 X20 20x5\"  20x5\"       Education   Donning/doffing clothing AF         Shrug hold and relax     15x5\"                      Webslide M, L        3x10 RTB                               Ther Ex             Pt Edu  KS KS MICHELLE- staying within protocol AF - protocol limitations Sling removal - TS  TS - prognosis, healing factors TS - precautions, progressions, HEP  TS - protocol, HEP    Pulleys         5'    R Shld PROM- flex, scap, ab < 90*  KS  KS MICHELLE- flex/abd/ER AF - flex/abd-ER   Table slides 20x5\"       Hand   HEP     Green x50 Blue x50       AAROM elbow flexion  2x10  2x10    x40        Finger to thumb  HEP     x40        Wrist flex/ext  HEP Decline    X25 ea        Elbow PROM- flex/ext   KS   4x10        Pendulums- side/side, fwd/bwd, cw/ccw   X20 ea  X20 ea  X 20 ea X30 ea       TB IR        3x10 RTB     TB ER        3x10 RTB                  Submaximal isometrics       20x5\" 3 way      Ther Activity                                       Gait Training                                       Modalities               TENS- MWR- R " shld   8 min            CP- R shld   8 min

## 2024-02-08 ENCOUNTER — APPOINTMENT (OUTPATIENT)
Dept: PHYSICAL THERAPY | Facility: CLINIC | Age: 34
End: 2024-02-08
Payer: OTHER MISCELLANEOUS

## 2024-02-08 DIAGNOSIS — S43.431D TEAR OF RIGHT GLENOID LABRUM, SUBSEQUENT ENCOUNTER: Primary | ICD-10-CM

## 2024-02-08 NOTE — PROGRESS NOTES
"Daily Note     Today's date: 2024  Patient name: Shantanu Sebastian  : 1990  MRN: [id number]  Referring provider: Earl Easley*  Dx:   Encounter Diagnosis     ICD-10-CM    1. Tear of right glenoid labrum, subsequent encounter  S43.431D                      Subjective: ***      Objective: See treatment diary below      Assessment: Tolerated treatment {Tolerated treatment :[id number]}. Patient {assessment:}      Plan: {PLAN:[id number]}     Precautions: asthma, history of chronic right shoulder pain, recent R labral repair     POC expires Unit limit Auth Expiration date PT/OT/ST + Visit Limit?   3/20/24 BOMN 3/1/24 30 visits                            Visit/Unit Tracking  AUTH Status:  Date        30 visits Used 1 2 3 4 5 6 7 8        Remaining  29 28 27 26 25 24 23 22             FOLLOW [facility] PROTOCOL    Access Code: [email or web address]  URL: [email or web address]  Date: 2024  Prepared by: Terry Wang    Manuals     Light Scap ROM   Trial; pain     TS TS TS     R shoulder PROM per protocol     TS - empty end feel TS TS  TS                    Re-eval         TS    Neuro Re-Ed             Scap pinch  X20  X20  x20 x20 X20 20x5\"  20x5\"       Education   Donning/doffing clothing AF         Shrug hold and relax     15x5\"                      Webslide M, L        3x10 RTB                               Ther Ex             Pt Edu  KS KS MICHELLE- staying within protocol AF - protocol limitations Sling removal - TS  TS - prognosis, healing factors TS - precautions, progressions, HEP  TS - protocol, HEP    Pulleys         5'    R Shld PROM- flex, scap, ab < 90*  KS  KS MICHELLE- flex/abd/ER AF - flex/abd-ER   Table slides 20x5\"       Hand   HEP     Green x50 Blue x50       AAROM elbow flexion  2x10  2x10    x40        Finger to thumb  HEP     x40        Wrist flex/ext  HEP Decline    X25 ea        Elbow PROM- flex/ext   " "KS   4x10        Pendulums- side/side, fwd/bwd, cw/ccw   X20 ea  X20 ea  X 20 ea X30 ea       TB IR        3x10 RTB     TB ER        3x10 RTB     Supine shoulder flexion AAROM with cane             Standing shoulder scaption AAROM with cane             Supine cane shoulder ER             Submaximal isometrics       20x5\" 3 way      Ther Activity                                       Gait Training                                       Modalities               TENS- MWR- R shld   8 min            CP- R shld   8 min                         "

## 2024-02-14 ENCOUNTER — TELEPHONE (OUTPATIENT)
Dept: NEUROLOGY | Facility: CLINIC | Age: 34
End: 2024-02-14

## 2024-02-14 ENCOUNTER — APPOINTMENT (OUTPATIENT)
Dept: PHYSICAL THERAPY | Facility: CLINIC | Age: 34
End: 2024-02-14
Payer: OTHER MISCELLANEOUS

## 2024-02-15 ENCOUNTER — OFFICE VISIT (OUTPATIENT)
Dept: PHYSICAL THERAPY | Facility: CLINIC | Age: 34
End: 2024-02-15
Payer: OTHER MISCELLANEOUS

## 2024-02-15 DIAGNOSIS — S43.431D TEAR OF RIGHT GLENOID LABRUM, SUBSEQUENT ENCOUNTER: Primary | ICD-10-CM

## 2024-02-15 PROCEDURE — 97110 THERAPEUTIC EXERCISES: CPT

## 2024-02-15 PROCEDURE — 97140 MANUAL THERAPY 1/> REGIONS: CPT

## 2024-02-15 PROCEDURE — 97112 NEUROMUSCULAR REEDUCATION: CPT

## 2024-02-15 NOTE — PROGRESS NOTES
"Daily Note    Today's date: 02/15/24  Patient name: Shantanu Sebastian  : 1990  MRN: [id number]  Referring provider: Earl Easley*  Dx:   Encounter Diagnosis     ICD-10-CM    1. Tear of right glenoid labrum, subsequent encounter  S43.431D           Start Time: 1730  Stop Time: 1800  Total time in clinic (min): 30 minutes      Subjective: Shantanu reports that moving his arm into external rotation. He notes that his arm has been able to move a bit better overhead, but is not pushing it. He notes that he has not been sleeping or eating well, only about 3 to 4 hours a night.    Objective: See treatment diary below.    Assessment: Shantanu tolerated treatment well with consistent cuing throughout. TE's were performed with increased reps and increased resistance. New TE's were demonstrated with proper technique, and tolerated well. Following treatment, the patient demonstrated fatigue post treatment, exhibited good technique with therapeutic exercises, and would benefit from continued PT.     Plan: Continue per plan of care.        Precautions: asthma, history of chronic right shoulder pain, recent R labral repair     POC expires Unit limit Auth Expiration date PT/OT/ST + Visit Limit?   3/20/24 BOMN 3/1/24 30 visits                            Visit/Unit Tracking  AUTH Status:  Date 12/27 1/4 1/11 1/18 1/22 1/25 2/2 2/15       30 visits Used 1 2 3 4 5 6 7 82        Remaining  29 28 27 26 25 24 23 22             FOLLOW [facility] PROTOCOL    Access Code: [email or web address]  URL: [email or web address]  Date: 2024  Prepared by: Terry Wang    Manuals  2/2     Light Scap ROM   Trial; pain     TS TS TS     R shoulder PROM per protocol     TS - empty end feel TS TS  TS                    Re-eval         TS    Neuro Re-Ed             Scap pinch  X20  X20  x20 x20 X20 20x5\"  20x5\"       Education   Donning/doffing clothing AF         Shrug hold and relax     15x5\"              " "        Julianne M, L        3x10 RTB  3x10 GTB                             Ther Ex             Pt Edu  KS KS MICHELLE- staying within protocol AF - protocol limitations Sling removal - TS  TS - prognosis, healing factors TS - precautions, progressions, HEP  TS - protocol, HEP    Pulleys         5' 5'   R Shld PROM- flex, scap, ab < 90*  KS  KS MICHELLE- flex/abd/ER AF - flex/abd-ER   Table slides 20x5\"       Hand   HEP     Green x50 Blue x50       AAROM elbow flexion  2x10  2x10    x40        Finger to thumb  HEP     x40        Wrist flex/ext  HEP Decline    X25 ea        Elbow PROM- flex/ext   KS   4x10        Pendulums- side/side, fwd/bwd, cw/ccw   X20 ea  X20 ea  X 20 ea X30 ea       TB IR        3x10 RTB  BTB 3x10    TB ER        3x10 RTB  Walkout 10x RTB                Submaximal isometrics       20x5\" 3 way      Ther Activity                                       Gait Training                                       Modalities               TENS- MWR- R shld   8 min            CP- R shld   8 min                         Terry Wang, PT  2/15/2024,6:18 PM  "

## 2024-02-16 ENCOUNTER — APPOINTMENT (OUTPATIENT)
Dept: PHYSICAL THERAPY | Facility: CLINIC | Age: 34
End: 2024-02-16
Payer: OTHER MISCELLANEOUS

## 2024-02-20 ENCOUNTER — APPOINTMENT (OUTPATIENT)
Dept: PHYSICAL THERAPY | Facility: CLINIC | Age: 34
End: 2024-02-20
Payer: OTHER MISCELLANEOUS

## 2024-02-22 ENCOUNTER — APPOINTMENT (OUTPATIENT)
Dept: PHYSICAL THERAPY | Facility: CLINIC | Age: 34
End: 2024-02-22
Payer: OTHER MISCELLANEOUS

## 2024-02-26 ENCOUNTER — OFFICE VISIT (OUTPATIENT)
Dept: PHYSICAL THERAPY | Facility: CLINIC | Age: 34
End: 2024-02-26
Payer: OTHER MISCELLANEOUS

## 2024-02-26 DIAGNOSIS — S43.431D TEAR OF RIGHT GLENOID LABRUM, SUBSEQUENT ENCOUNTER: Primary | ICD-10-CM

## 2024-02-26 PROCEDURE — 97110 THERAPEUTIC EXERCISES: CPT

## 2024-02-26 PROCEDURE — 97140 MANUAL THERAPY 1/> REGIONS: CPT

## 2024-02-26 PROCEDURE — 97112 NEUROMUSCULAR REEDUCATION: CPT

## 2024-02-26 NOTE — PROGRESS NOTES
"Daily Note    Today's date: 24  Patient name: Shantanu Sebastian  : 1990  MRN: [id number]  Referring provider: Earl Easley*  Dx:   Encounter Diagnosis     ICD-10-CM    1. Tear of right glenoid labrum, subsequent encounter  S43.431D           Start Time: 1130  Stop Time: 1215  Total time in clinic (min): 45 minutes      Subjective: Shantanu reports that he has been really stiff in his shoulder recently because he didn't have PT last week. He notes adherence to HEP.    Objective: See treatment diary below.    Assessment: Shantanu tolerated treatment well with consistent cuing throughout. TE's were performed with increased reps and increased resistance. New TE's were demonstrated with proper technique, and tolerated well. Following treatment, the patient demonstrated fatigue post treatment, exhibited good technique with therapeutic exercises, and would benefit from continued PT.     Plan: Continue per plan of care.  Progress treament per protocol.        Precautions: asthma, history of chronic right shoulder pain, recent R labral repair     POC expires Unit limit Auth Expiration date PT/OT/ST + Visit Limit?   3/20/24 BOMN 3/1/24 30 visits                            Visit/Unit Tracking  AUTH Status:  Date 12/27 1/4 1/11 1/18 1/22 1/25 2/2 2/15 2/26      30 visits Used 1 2 3 4 5 6 7 82        Remaining  29 28 27 26 25 24 23 22             FOLLOW [facility] PROTOCOL    Access Code: [email or web address]  URL: [email or web address]  Date: 2024  Prepared by: Terry Wang    Manuals  22 2/6 2/15   Light Scap ROM       TS TS TS     R shoulder PROM per protocol TS    TS - empty end feel TS TS  TS                    Re-eval         TS    Neuro Re-Ed             Scap pinch    x20 x20 X20 20x5\"  20x5\"       Education   Donning/doffing clothing AF         Shrug hold and relax     15x5\"                      Webslide M, L BTB 3x10        3x10 RTB  3x10 GTB                           " "  Ther Ex             Pt Edu    MICHELLE- staying within protocol AF - protocol limitations Sling removal - TS  TS - prognosis, healing factors TS - precautions, progressions, HEP  TS - protocol, HEP    UBE for posture and cardio 3'/3' trial            Pulleys 5'        5' 5'   R Ariadneld PROM- flex, scap, ab < 90*    MICHELLE- flex/abd/ER AF - flex/abd-ER   Table slides 20x5\"       Hand       Green x50 Blue x50       Finger ladder ecc. control 15x5\"                                                                TB IR 3x10 RTB       3x10 RTB  BTB 3x10    TB ER  3x10 GTB       3x10 RTB  Walkout 10x RTB                Submaximal isometrics       20x5\" 3 way      Ther Activity                                       Gait Training                                       Modalities               TENS- MWR- R giorgi              CP- R giorgi Wang, PT  2/26/2024,12:55 PM  "

## 2024-02-28 ENCOUNTER — OFFICE VISIT (OUTPATIENT)
Dept: PHYSICAL THERAPY | Facility: CLINIC | Age: 34
End: 2024-02-28
Payer: OTHER MISCELLANEOUS

## 2024-02-28 DIAGNOSIS — S43.431D TEAR OF RIGHT GLENOID LABRUM, SUBSEQUENT ENCOUNTER: Primary | ICD-10-CM

## 2024-02-28 PROCEDURE — 97140 MANUAL THERAPY 1/> REGIONS: CPT

## 2024-02-28 PROCEDURE — 97110 THERAPEUTIC EXERCISES: CPT

## 2024-02-28 PROCEDURE — 97112 NEUROMUSCULAR REEDUCATION: CPT

## 2024-02-28 NOTE — PROGRESS NOTES
Daily Note     Today's date: 2024  Patient name: Shantanu Sebastian  : 1990  MRN: [id number]  Referring provider: Earl Easley*  Dx:   Encounter Diagnosis     ICD-10-CM    1. Tear of right glenoid labrum, subsequent encounter  S43.431D           Start Time: 1218  Stop Time: 1243  Total time in clinic (min): 25 minutes    Subjective: . R shoulder p! 5/10. He noted that this is more than his normal for the past 2 weeks.  Noted having pain anterior shoulder since the nerve block wore off after surgery.  Weather may have a factor on increase in p!.   Pt concerned of another dislocation.   Ortho follow up on 3/4/24.     Objective: See treatment diary below      Assessment:  Continued with treatment session within protocol. Pt at this time within protocol is behind.  Gentle PROM completed today and reported p! With abd as well as ER in scapular plane. Grimacing x 2 reported with PROM. Decreased resistance required for TB's at this time as well.     HEP compliance on a daily basis.   Tolerated treatment well. Patient exhibited good technique with therapeutic exercises and would benefit from continued PT   From Advised patient on his precautions at this time within protocol such as no lifting over 10#, no sudden lifting or pushing as well as overhead lifting.       Plan: Continue per plan of care.      Precautions: asthma, history of chronic right shoulder pain, recent R labral repair     POC expires Unit limit Auth Expiration date PT/OT/ST + Visit Limit?   3/20/24 BOMN 3/1/24 30 visits                            Visit/Unit Tracking  AUTH Status:  Date 12/27 1/4 1/11 1/18 1/22 1/25 2/2 2/15 2/26 2/28     30 visits Used 1 2 3 4 5 6 7 82 9 10      Remaining  29 28 27 26 25 24 23 22 21 20           FOLLOW [facility] PROTOCOL    Access Code: [email or web address]  URL: [email or web address]  Date: 2024  Prepared by: Terry Wang    Manuals  2/2 2/6 2/15   Light Scap ROM        "TS TS TS     R shoulder PROM per protocol TS SC   TS - empty end feel TS TS  TS                    Re-eval         TS    Neuro Re-Ed             Scap pinch     x20 X20 20x5\"  20x5\"       Education    AF         Shrug hold and relax     15x5\"                      Julianne M, L BTB 3x10  BTB 3x 10       3x10 RTB  3x10 GTB                             Ther Ex             Pt Edu     AF - protocol limitations Sling removal - TS  TS - prognosis, healing factors TS - precautions, progressions, HEP  TS - protocol, HEP    UBE for posture and cardio 3'/3' trial Hold            Pulleys 5' 5 min        5' 5'   R Shld PROM- flex, scap, ab < 90*     AF - flex/abd-ER   Table slides 20x5\"       Hand       Green x50 Blue x50       Finger ladder ecc. control 15x5\"                                                                TB IR 3x10 RTB 2x 10 RTB       3x10 RTB  BTB 3x10    TB ER  3x10 GTB RTB walkouts 10x (discomfort)      3x10 RTB  Walkout 10x RTB                Submaximal isometrics       20x5\" 3 way      Ther Activity                                       Gait Training                                       Modalities               TENS- MWR- R shld              CP- R shld                               "

## 2024-03-04 ENCOUNTER — OFFICE VISIT (OUTPATIENT)
Dept: OBGYN CLINIC | Facility: OTHER | Age: 34
End: 2024-03-04

## 2024-03-04 VITALS
HEIGHT: 72 IN | SYSTOLIC BLOOD PRESSURE: 125 MMHG | BODY MASS INDEX: 20.02 KG/M2 | DIASTOLIC BLOOD PRESSURE: 76 MMHG | HEART RATE: 73 BPM | WEIGHT: 147.8 LBS

## 2024-03-04 DIAGNOSIS — S43.431D TEAR OF RIGHT GLENOID LABRUM, SUBSEQUENT ENCOUNTER: Primary | ICD-10-CM

## 2024-03-04 PROCEDURE — 99024 POSTOP FOLLOW-UP VISIT: CPT | Performed by: PHYSICIAN ASSISTANT

## 2024-03-04 RX ORDER — AMOXICILLIN AND CLAVULANATE POTASSIUM 875; 125 MG/1; MG/1
1 TABLET, FILM COATED ORAL 2 TIMES DAILY
COMMUNITY
Start: 2024-02-07

## 2024-03-04 NOTE — LETTER
March 4, 2024     Patient: Shantanu Sebastian  YOB: 1990  Date of Visit: 3/4/2024      To Whom it May Concern:    Shantanu Sebastian is under my professional care. Shantanu was seen in my office on 3/4/2024. Shantanu will continue with current restriction until follow up of right shoulder MRI.    If you have any questions or concerns, please don't hesitate to call.         Sincerely,          Eliane Uriarte PA-C        CC: No Recipients

## 2024-03-05 ENCOUNTER — TELEPHONE (OUTPATIENT)
Age: 34
End: 2024-03-05

## 2024-03-05 NOTE — TELEPHONE ENCOUNTER
Caller: Jonathan Camacho    Doctor/Office: Kaushal MOLINA#:        What needs to be faxed: OVN for 3/4    ATTN to: Janice- Claim#[id number]     Fax#: [phone]      Documents were successfully e-faxed--on 3/5/24

## 2024-03-07 ENCOUNTER — APPOINTMENT (OUTPATIENT)
Dept: PHYSICAL THERAPY | Facility: CLINIC | Age: 34
End: 2024-03-07
Payer: OTHER MISCELLANEOUS

## 2024-03-07 ENCOUNTER — TELEPHONE (OUTPATIENT)
Age: 34
End: 2024-03-07

## 2024-03-07 NOTE — TELEPHONE ENCOUNTER
Caller: Janice Bryant     Doctor/Office:  Chapito Del Rio / Fernando     CB#: NA    What needs to be faxed: Work note from 3/4    ATTN to: Janice     Fax#: [phone]       Documents were successfully e-faxed   Yes

## 2024-03-07 NOTE — PROGRESS NOTES
"Daily Note     Today's date: 3/7/2024  Patient name: Shantanu Sebastian  : 1990  MRN: [id number]  Referring provider: Earl Easley*  Dx:   Encounter Diagnosis     ICD-10-CM    1. Tear of right glenoid labrum, subsequent encounter  S43.431D                      Subjective: ***      Objective: See treatment diary below      Assessment: Tolerated treatment well. Patient demonstrated fatigue post treatment and would benefit from continued PT.       Plan: Continue per plan of care.  Progress treatment as tolerated.       Precautions: asthma, history of chronic right shoulder pain, recent R labral repair     POC expires Unit limit Auth Expiration date PT/OT/ST + Visit Limit?   3/20/24 BOMN 3/1/24 30 visits                            Visit/Unit Tracking  AUTH Status:  Date 12/27 1/4 1/11 1/18 1/22 1/25 2/2 2/15 2/26 2/28 3/7    30 visits Used 1 2 3 4 5 6 7 82 9 10 11     Remaining  29 28 27 26 25 24 23 22 21 20 19          FOLLOW [facility] PROTOCOL    Access Code: [email or web address]  URL: [email or web address]  Date: 2024  Prepared by: Terry Wang    Manuals 2/26 2/28 3/7  1/18 1/22 1/25 2/2 2/6 2/15   Light Scap ROM       TS TS TS     R shoulder PROM per protocol TS SC   TS - empty end feel TS TS  TS                    Re-eval         TS    Neuro Re-Ed             Scap pinch      X20 20x5\"  20x5\"       Education             Shrug hold and relax     15x5\"                      Julianne M, L BTB 3x10  BTB 3x 10       3x10 RTB  3x10 GTB                             Ther Ex             Pt Edu      Sling removal - TS  TS - prognosis, healing factors TS - precautions, progressions, HEP  TS - protocol, HEP    UBE for posture and cardio 3'/3' trial Hold            Pulleys 5' 5 min        5' 5'   R Shld PROM- flex, scap, ab < 90*        Table slides 20x5\"       Hand       Green x50 Blue x50       Finger ladder ecc. control 15x5\"                                                                TB IR 3x10 RTB " "2x 10 RTB       3x10 RTB  BTB 3x10    TB ER  3x10 GTB RTB walkouts 10x (discomfort)      3x10 RTB  Walkout 10x RTB                Submaximal isometrics       20x5\" 3 way      Ther Activity                                       Gait Training                                       Modalities               TENS- MWR- R shld              CP- R shld                                 "

## 2024-03-12 NOTE — NURSING NOTE
Call placed to patient to discuss upcoming appointment at [facility] radiology department and complete consultation with patient. Patient is having a right shoulder MRI arthrogram utilizing fluoroscopy  guidance. Reviewed patient's allergies, no current anticoagulant medication present per patient, also discussed the pre and post procedure expectations. Reminded patient of location and time expected for procedure, Patient expressed understanding by verbalizing and repeating instructions. Patient wanted information sent via my chart, see message below that was sent.   Good morning Mr. Sebastian,    We are located at [address] [place] [address]. Your appointment is scheduled for 325/24 @ 1 pm you are scheduled to have a right shoulder MRI arthrogram. You will need to enter Building [address], come up to the 1st floor and locate the Radiology department. Registration is in the Radiology department, please arrive 15 minutes prior so you may go through the registration process. You will have your Fluoro guided procedure then be guided to our MRI suite for your images. For this test there are no restrictions, you may eat, drink, take all your usual medications and drive yourself to and from the appointment.     If you have any question or concerns please feel free to contact me at the number below,   Belinda Saavedra RN  [facility] Radiology RN  [address] [place] [id number]  [phone] (Office)  [phone] (Fax)  [email or web address]

## 2024-03-14 ENCOUNTER — OFFICE VISIT (OUTPATIENT)
Dept: PHYSICAL THERAPY | Facility: CLINIC | Age: 34
End: 2024-03-14
Payer: OTHER MISCELLANEOUS

## 2024-03-14 DIAGNOSIS — S43.431D TEAR OF RIGHT GLENOID LABRUM, SUBSEQUENT ENCOUNTER: Primary | ICD-10-CM

## 2024-03-14 PROCEDURE — 97110 THERAPEUTIC EXERCISES: CPT

## 2024-03-14 PROCEDURE — 97140 MANUAL THERAPY 1/> REGIONS: CPT

## 2024-03-14 NOTE — PROGRESS NOTES
"Daily Note     Today's date: 3/14/2024  Patient name: Shantanu Sebastian  : 1990  MRN: [id number]  Referring provider: Earl Easley*  Dx:   Encounter Diagnosis     ICD-10-CM    1. Tear of right glenoid labrum, subsequent encounter  S43.431D           Start Time: 1145  Stop Time: 1216  Total time in clinic (min): 31 minutes    Subjective: pt noted that he does have an MRI scheduled for 3/20/24. He noted that his R shoulder does feel sore still. He noted that he is still not lifting anything and requires some help with ADL's (has his wife help put his shirt on)       Objective: See treatment diary below      Assessment:  Continued with treatment session with focus on R shoulder s/p arthroscopic right shoulder bankart repair completed on 23. Following protocol with exercises completed at this time. Due to having increase pain and soreness modifications made as needed. Tolerated treatment well. Patient demonstrated fatigue post treatment.     Advised to continue with his precautions as this time no lifting > than 10 lbs, sudden lifting or pushing, overhead lifting.         Plan: Continue per plan of care.      Precautions: asthma, history of chronic right shoulder pain, recent R labral repair     POC expires Unit limit Auth Expiration date PT/OT/ST + Visit Limit?   3/20/24 BOMN 3/1/24 30 visits                            Visit/Unit Tracking  AUTH Status:  Date  2/2 2/15 2/26 2/28 3/14    30 visits Used 1 2 3 4 5 6 7 82 9 10 11     Remaining  29 28 27 26 25 24 23 22 21 20 19          FOLLOW [facility] PROTOCOL    Access Code: [email or web address]  URL: [email or web address]  Date: 2024  Prepared by: Terry Wang    Manuals 2/26 2/28 3/14  1/18 1/22 1/25 2/2 2/6 2/15   Light Scap ROM       TS TS TS     R shoulder PROM per protocol TS SC SC gentle   TS - empty end feel TS TS  TS                    Re-eval         TS    Neuro Re-Ed             Scap pinch      X20 20x5\"  " "20x5\"       Education             Shrug hold and relax     15x5\"                      Julianne M, L BTB 3x10  BTB 3x 10  Hold      3x10 RTB  3x10 GTB                             Ther Ex             Pt Edu      Sling removal - TS  TS - prognosis, healing factors TS - precautions, progressions, HEP  TS - protocol, HEP    UBE for posture and cardio 3'/3' trial Hold  3'/3'          Pulleys 5' 5 min  5 min       5' 5'   R Shld PROM- flex, scap, ab < 90*        Table slides 20x5\"       Hand       Green x50 Blue x50       Finger ladder ecc. control 15x5\"                                                                TB IR 3x10 RTB 2x 10 RTB  hold     3x10 RTB  BTB 3x10    TB ER  3x10 GTB RTB walkouts 10x (discomfort) Hold      3x10 RTB  Walkout 10x RTB                Submaximal isometrics       20x5\" 3 way      Ther Activity                                       Gait Training                                       Modalities               TENS- MWR- R shld              CP- R shld                                 "

## 2024-03-20 ENCOUNTER — TELEPHONE (OUTPATIENT)
Dept: NEUROLOGY | Facility: CLINIC | Age: 34
End: 2024-03-20

## 2024-03-20 NOTE — TELEPHONE ENCOUNTER
LVM telling patient the the provider will be on vacation 4-1-24 and I had to move his EMG appt to 4-5 @ 9:45.  Explained to patient if this was not feasible,  please call back to re-schedule

## 2024-03-21 ENCOUNTER — OFFICE VISIT (OUTPATIENT)
Dept: PHYSICAL THERAPY | Facility: CLINIC | Age: 34
End: 2024-03-21
Payer: OTHER MISCELLANEOUS

## 2024-03-21 DIAGNOSIS — S43.431D TEAR OF RIGHT GLENOID LABRUM, SUBSEQUENT ENCOUNTER: Primary | ICD-10-CM

## 2024-03-21 PROCEDURE — 97140 MANUAL THERAPY 1/> REGIONS: CPT

## 2024-03-21 PROCEDURE — 97110 THERAPEUTIC EXERCISES: CPT

## 2024-03-21 NOTE — PROGRESS NOTES
"Daily Note     Today's date: 3/21/2024  Patient name: Shantanu Sebastian  : 1990  MRN: [id number]  Referring provider: Earl Easley*  Dx:   Encounter Diagnosis     ICD-10-CM    1. Tear of right glenoid labrum, subsequent encounter  S43.431D                      Subjective: Patient reports that he has an MRI on the shoulder scheduled on 3/25/24.       Objective: See treatment diary below      Assessment: Tolerated treatment well. Held all strengthening exercises this session due to continued pain in shoulder and awaiting MRI completion and results. He had very limited range of motion achieved during PROM secondary to pain. Patient demonstrated fatigue post treatment, exhibited good technique with therapeutic exercises, and would benefit from continued PT      Plan: Continue per plan of care.      Precautions: asthma, history of chronic right shoulder pain, recent R labral repair     POC expires Unit limit Auth Expiration date PT/OT/ST + Visit Limit?   3/20/24 BOMN 3/1/24 30 visits                            Visit/Unit Tracking  AUTH Status:  Date 12/27 1/4 1/11 1/18 1/22 1/25 2/2 2/15 2/26 2/28 3/14 3/21   30 visits Used 1 2 3 4 5 6 7 82 9 10 11 12    Remaining  29 28 27 26 25 24 23 22 21 20 19 18         FOLLOW [facility] PROTOCOL    Access Code: [email or web address]  URL: [email or web address]  Date: 2024  Prepared by: Terry Wang    Manuals 2/26 2/28 3/14 3/21 1/18 1/22 1/25 2/2 2/6 2/15   Light Scap ROM       TS TS TS     R shoulder PROM per protocol TS SC SC gentle  BE  <90 degrees TS - empty end feel TS TS  TS                    Re-eval         TS    Neuro Re-Ed             Scap pinch      X20 20x5\"  20x5\"       Education             Shrug hold and relax     15x5\"                      Webslide M, L BTB 3x10  BTB 3x 10  Hold  held    3x10 RTB  3x10 GTB                             Ther Ex             Pt Edu      Sling removal - TS  TS - prognosis, healing factors TS - precautions, progressions, " "HEP  TS - protocol, HEP    UBE for posture and cardio 3'/3' trial Hold  3'/3' 3'/3'         Pulleys 5' 5 min  5 min  5 min     5' 5'   R Shld PROM- flex, scap, ab < 90*        Table slides 20x5\"       Hand       Green x50 Blue x50       Finger ladder ecc. control 15x5\"   15x5\"                                                             TB IR 3x10 RTB 2x 10 RTB  hold held    3x10 RTB  BTB 3x10    TB ER  3x10 GTB RTB walkouts 10x (discomfort) Hold  Held    3x10 RTB  Walkout 10x RTB                Submaximal isometrics       20x5\" 3 way      Ther Activity                                       Gait Training                                       Modalities               TENS- MWR- R shld              CP- R shld                                   "

## 2024-03-25 ENCOUNTER — HOSPITAL ENCOUNTER (OUTPATIENT)
Dept: RADIOLOGY | Facility: HOSPITAL | Age: 34
Discharge: HOME | End: 2024-03-25
Payer: OTHER MISCELLANEOUS

## 2024-03-25 DIAGNOSIS — S43.431D TEAR OF RIGHT GLENOID LABRUM, SUBSEQUENT ENCOUNTER: ICD-10-CM

## 2024-03-25 PROCEDURE — 73222 MRI JOINT UPR EXTREM W/DYE: CPT

## 2024-03-25 PROCEDURE — 73040 CONTRAST X-RAY OF SHOULDER: CPT

## 2024-03-25 PROCEDURE — A9585 GADOBUTROL INJECTION: HCPCS | Performed by: PHYSICIAN ASSISTANT

## 2024-03-25 PROCEDURE — 77002 NEEDLE LOCALIZATION BY XRAY: CPT

## 2024-03-25 PROCEDURE — 23350 INJECTION FOR SHOULDER X-RAY: CPT

## 2024-03-25 RX ORDER — GADOBUTROL 604.72 MG/ML
2 INJECTION INTRAVENOUS
Status: COMPLETED | OUTPATIENT
Start: 2024-03-25 | End: 2024-03-25

## 2024-03-25 RX ORDER — ROPIVACAINE HYDROCHLORIDE 2 MG/ML
20 INJECTION, SOLUTION EPIDURAL; INFILTRATION; PERINEURAL ONCE
Status: DISCONTINUED | OUTPATIENT
Start: 2024-03-25 | End: 2024-03-26 | Stop reason: HOSPADM

## 2024-03-25 RX ORDER — LIDOCAINE HYDROCHLORIDE 10 MG/ML
5 INJECTION, SOLUTION EPIDURAL; INFILTRATION; INTRACAUDAL; PERINEURAL
Status: DISCONTINUED | OUTPATIENT
Start: 2024-03-25 | End: 2024-03-26 | Stop reason: HOSPADM

## 2024-03-25 RX ORDER — SODIUM CHLORIDE 9 MG/ML
50 INJECTION INTRAVENOUS
Status: DISCONTINUED | OUTPATIENT
Start: 2024-03-25 | End: 2024-03-26 | Stop reason: HOSPADM

## 2024-03-25 RX ADMIN — IOHEXOL 2 ML: 300 INJECTION, SOLUTION INTRAVENOUS at 14:01

## 2024-03-25 RX ADMIN — GADOBUTROL 1 ML: 604.72 INJECTION INTRAVENOUS at 14:01

## 2024-03-28 ENCOUNTER — OFFICE VISIT (OUTPATIENT)
Dept: PHYSICAL THERAPY | Facility: CLINIC | Age: 34
End: 2024-03-28
Payer: OTHER MISCELLANEOUS

## 2024-03-28 DIAGNOSIS — S43.431D TEAR OF RIGHT GLENOID LABRUM, SUBSEQUENT ENCOUNTER: Primary | ICD-10-CM

## 2024-03-28 PROCEDURE — 97112 NEUROMUSCULAR REEDUCATION: CPT

## 2024-03-28 PROCEDURE — 97140 MANUAL THERAPY 1/> REGIONS: CPT

## 2024-03-28 PROCEDURE — 97110 THERAPEUTIC EXERCISES: CPT

## 2024-03-28 NOTE — PROGRESS NOTES
"Daily Note     Today's date: 3/28/2024  Patient name: Shantanu Sebastian  : 1990  MRN: [id number]  Referring provider: Earl Easley*  Dx:   Encounter Diagnosis     ICD-10-CM    1. Tear of right glenoid labrum, subsequent encounter  S43.431D           Start Time: 0800  Stop Time: 0831  Total time in clinic (min): 31 minutes    Subjective: pt noted that he feels a little better today and noted that he did have the MRI yesterday but would like to still take it easy until reviewed results with ortho.       Objective: See treatment diary below      Assessment: Continued with treatment session. Pt requested to trial some of the bands at home.  Tolerated treatment well. Patient demonstrated fatigue post treatment, exhibited good technique with therapeutic exercises, and would benefit from continued PT      Plan: Continue per plan of care.      Precautions: asthma, history of chronic right shoulder pain, recent R labral repair     POC expires Unit limit Auth Expiration date PT/OT/ST + Visit Limit?   3/20/24 BOMN 3/1/24 30 visits                            Visit/Unit Tracking  AUTH Status:  Date 3/28 1/4 1/11 1/18 1/22 1/25 2/2 2/15 2/26 2/28 3/14 3/21   30 visits Used 13 2 3 4 5 6 7 82 9 10 11 12    Remaining  17 28 27 26 25 24 23 22 21 20 19 18         FOLLOW [facility] PROTOCOL    Access Code: [email or web address]  URL: [email or web address]  Date: 2024  Prepared by: Terry Wang    Manuals 2/26 2/28 3/14 3/21 3/28   1/25 2/2 2/6 2/15   Light Scap ROM        TS TS     R shoulder PROM per protocol TS SC SC gentle  BE  <90 degrees SC   TS  TS                    Re-eval         TS    Neuro Re-Ed             Scap pinch        20x5\"       Education             Shrug hold and relax                          Webslide M, L BTB 3x10  BTB 3x 10  Hold  held BTB 3x 10    3x10 RTB  3x10 GTB                             Ther Ex             Pt Edu        TS - prognosis, healing factors TS - precautions, progressions, HEP " " TS - protocol, HEP    UBE for posture and cardio 3'/3' trial Hold  3'/3' 3'/3' Hold         Pulleys 5' 5 min  5 min  5 min 4 min     5' 5'   R Shld PROM- flex, scap, ab < 90*        Table slides 20x5\"       Hand               Finger ladder ecc. control 15x5\"   15x5\" 15x 5\"                                                             TB IR 3x10 RTB 2x 10 RTB  hold held RTB 2x 10    3x10 RTB  BTB 3x10    TB ER  3x10 GTB RTB walkouts 10x (discomfort) Hold  Held RTB 2x 10    3x10 RTB  Walkout 10x RTB                Submaximal isometrics       20x5\" 3 way      Ther Activity                                       Gait Training                                       Modalities               TENS- MWR- R shld              CP- R shld                                     "

## 2024-04-03 ENCOUNTER — TELEPHONE (OUTPATIENT)
Dept: OBGYN CLINIC | Facility: HOSPITAL | Age: 34
End: 2024-04-03

## 2024-04-03 NOTE — TELEPHONE ENCOUNTER
Caller: Brandon WALDEN    Doctor: Kaushal    Reason for call: Sharon calling to confirm if patient has a follow up appointment scheduled     Call back#: [phone]

## 2024-04-04 ENCOUNTER — TELEPHONE (OUTPATIENT)
Dept: NEUROLOGY | Facility: CLINIC | Age: 34
End: 2024-04-04

## 2024-04-05 ENCOUNTER — PROCEDURE VISIT (OUTPATIENT)
Dept: NEUROLOGY | Facility: CLINIC | Age: 34
End: 2024-04-05

## 2024-04-05 ENCOUNTER — TELEPHONE (OUTPATIENT)
Dept: NEUROLOGY | Facility: CLINIC | Age: 34
End: 2024-04-05

## 2024-04-05 ENCOUNTER — TELEPHONE (OUTPATIENT)
Age: 34
End: 2024-04-05

## 2024-04-05 DIAGNOSIS — M79.2 NEUROPATHIC PAIN: ICD-10-CM

## 2024-04-05 DIAGNOSIS — G89.29 CHRONIC PAIN OF RIGHT KNEE: ICD-10-CM

## 2024-04-05 DIAGNOSIS — Z98.890 HISTORY OF LUMBOSACRAL SPINE SURGERY: ICD-10-CM

## 2024-04-05 DIAGNOSIS — M25.561 CHRONIC PAIN OF RIGHT KNEE: ICD-10-CM

## 2024-04-05 NOTE — TELEPHONE ENCOUNTER
"Patient came to EMG appointment on 4/5/24 refusing to sign the ABN form.  Patient stated that he did not have medical insurance only workmen's comp insurance. Per Flor Aburto, the patient must have medical insurance in addition to workmen's comp insurance otherwise patient must sign the financial agreement.  Flor Aburto said, \"If the patient refused to sign the financial agreement and we do no have any insurance to bill, then we can not see the patient\".    "

## 2024-04-05 NOTE — TELEPHONE ENCOUNTER
Caller: Neelam     Doctor: Kaushal    Reason for call: Questioned if the results of the 3/25 are available(advised yes) & if Dr Easley reviewed the results? Please cb to advise    Call back#: [phone]

## 2024-04-05 NOTE — TELEPHONE ENCOUNTER
Caller: patient    Doctor: leslie    Reason for call: patient will be calling back with a Fax. If he finds a sooner appt for his EMG    Call back#:

## 2024-04-10 ENCOUNTER — APPOINTMENT (OUTPATIENT)
Dept: PHYSICAL THERAPY | Facility: CLINIC | Age: 34
End: 2024-04-10
Payer: OTHER MISCELLANEOUS

## 2024-04-10 NOTE — TELEPHONE ENCOUNTER
Caller: erin Fournier    Doctor: Jacky    Reason for call: pt called asking to have the EMG order faxed to [phone].    His  at [facility] is going to reach out to some locations for the study to be done before the Tess date that central scheduling gave him    Call back#: [phone]

## 2024-04-11 NOTE — TELEPHONE ENCOUNTER
Faxed EMG to [facility]. Also spoke with patient . Explained I will send a message to Sharon in order to see if his appt can be moved up

## 2024-04-15 ENCOUNTER — OFFICE VISIT (OUTPATIENT)
Dept: OBGYN CLINIC | Facility: OTHER | Age: 34
End: 2024-04-15
Payer: OTHER MISCELLANEOUS

## 2024-04-15 VITALS
DIASTOLIC BLOOD PRESSURE: 74 MMHG | WEIGHT: 149 LBS | HEART RATE: 75 BPM | BODY MASS INDEX: 20.18 KG/M2 | SYSTOLIC BLOOD PRESSURE: 130 MMHG | HEIGHT: 72 IN

## 2024-04-15 DIAGNOSIS — S43.431D TEAR OF RIGHT GLENOID LABRUM, SUBSEQUENT ENCOUNTER: Primary | ICD-10-CM

## 2024-04-15 PROCEDURE — 99214 OFFICE O/P EST MOD 30 MIN: CPT | Performed by: ORTHOPAEDIC SURGERY

## 2024-04-15 NOTE — LETTER
April 15, 2024     Patient: Shantanu Sebastian  YOB: 1990  Date of Visit: 4/15/2024      To Whom it May Concern:    Shantanu Sebastian is under my professional care. Shantanu was seen in my office on 4/15/2024. Shantanu will continue his current restriction of no use of the right upper extremity until he is evaluated by occupational medicine.     If you have any questions or concerns, please don't hesitate to call.         Sincerely,          Earl Easley MD        CC: No Recipients

## 2024-04-15 NOTE — PROGRESS NOTES
Assessment  Diagnoses and all orders for this visit:    Tear of right glenoid labrum, subsequent encounter      Discussion and Plan:    Discussed with the patient that the MRI does not show a re-tear.  Patient may confidently progress physical therapy at this time.   Patient will continue his current restriction of no use of the RUE.  Referral was placed for occupational medicine for further management of his work related injury as I feel confident that he will not require further surgical treatment of his shoulder.  .     Subjective:   Patient ID: Shantanu Sebastian is a 33 y.o. male      HPI    Patient presents today to discuss the findings of his right shoulder MRI arthrogram ordered because he reports the shoulder felt unstable.  Patient is 3 mos s/p bankart repair 12/22/23.  He has been attending PT as instructed.     The following portions of the patient's history were reviewed and updated as appropriate: allergies, current medications, past family history, past medical history, past social history, past surgical history and problem list.    Objective:  /74 (BP Location: Right arm, Patient Position: Sitting, Cuff Size: Standard)   Pulse 75   Ht 6' (1.829 m)   Wt 67.6 kg (149 lb)   BMI 20.21 kg/m²       Right Shoulder Exam     Range of Motion   External rotation:  50   Forward flexion:  normal     Other   Erythema: absent  Sensation: normal  Pulse: present            Physical Exam  Vitals reviewed.   Constitutional:       Appearance: He is well-developed.   HENT:      Head: Normocephalic.   Eyes:      Pupils: Pupils are equal, round, and reactive to light.   Pulmonary:      Effort: Pulmonary effort is normal.   Abdominal:      General: Abdomen is flat. There is no distension.   Skin:     General: Skin is warm and dry.           I have personally reviewed pertinent films in PACS and my interpretation is as follows.    MRI arthrogram right shoulder demonstrates post-surgical changes no evidence of tear,  mild RTC tendonitis, old [place]-sachs    Scribe Attestation      I,:  Manju Bowen am acting as a scribe while in the presence of the attending physician.:       I,:  Earl Easley MD personally performed the services described in this documentation    as scribed in my presence.:

## 2024-04-16 ENCOUNTER — TELEPHONE (OUTPATIENT)
Dept: OBGYN CLINIC | Facility: HOSPITAL | Age: 34
End: 2024-04-16

## 2024-04-16 NOTE — TELEPHONE ENCOUNTER
Caller: [employer]    Doctor/Office: Bry Coello     CB#: [phone]      What needs to be faxed: OVN 4/15 and Work status letter    ATTN to: Claim# [id number]    Fax#: [phone]      Documents were successfully e-faxed

## 2024-04-16 NOTE — TELEPHONE ENCOUNTER
Caller: [employer]    Doctor/Office: Kaushal MOLINA#: [phone]      What needs to be faxed: Occupational medicine referral     ATTN to: Claim # [id number]    Fax#: [phone]

## 2024-04-17 ENCOUNTER — EVALUATION (OUTPATIENT)
Dept: PHYSICAL THERAPY | Facility: CLINIC | Age: 34
End: 2024-04-17
Payer: OTHER MISCELLANEOUS

## 2024-04-17 DIAGNOSIS — S43.431D TEAR OF RIGHT GLENOID LABRUM, SUBSEQUENT ENCOUNTER: Primary | ICD-10-CM

## 2024-04-17 PROCEDURE — 97140 MANUAL THERAPY 1/> REGIONS: CPT

## 2024-04-17 PROCEDURE — 97112 NEUROMUSCULAR REEDUCATION: CPT

## 2024-04-17 PROCEDURE — 97110 THERAPEUTIC EXERCISES: CPT

## 2024-04-17 NOTE — PROGRESS NOTES
PT Re-Evaluation     Today's date: 2024  Patient name: Shantanu Sebastian  : 1990  MRN: [id number]  Referring provider: Earl Easley*  Dx:   Encounter Diagnosis     ICD-10-CM    1. Tear of right glenoid labrum, subsequent encounter  S43.431D                      Assessment  Assessment details: Shantanu Sebastian is a 33 y.o. male who presents today for a re-evaluation of his right shoulder bankart repair on 23. He reports 30% improvement since the beginning of his PT POC. He demonstrates significant deficits in his shoulder active and passive ROM, with empty end feels for shoulder ROM passively. Strength remains very limited in the shoulder, however true assessment of this is limited largely due to pain with resistance. He reports continued deficits with functional use of his arm for dressing and caring for his children as well as light to moderate household lifting. He remains out of work at this time due to current restrictions unable to be accommodated. He did have a recent MRI of the shoulder that revealed intact labrum and RTC musculature. He is to follow up with Occupational Medicine for continued management of his care. The patient would benefit from continued skilled PT services to address his deficits, functional limitations, and return him to his PLOF. He was educated on his updated POC to focus on improving his ROM within pain allowance and was provided with an updated HEP. He verbalized understanding and denied questions at end of session.    Impairments: abnormal or restricted ROM, activity intolerance, impaired physical strength, lacks appropriate home exercise program and pain with function  Understanding of Dx/Px/POC: good   Prognosis: good    Goals  STG within 4 weeks:   1. Patient to be independent in basic HEP. MET  2. Reduce pain by 50% to improve quality of life. - NOT MET  3. Improve PROM flexion to 90*. - GOAL MET       LTG within 8 weeks: ALL NOT MET-PROGRESSING   1.  Patient to be independent in ADLs/IADLs without difficulty.   2. The patient will demonstrate WFL shoulder ROM in all directions to allow completion of all functional activities.   3. The patient will demonstrate WFL shoulder MMTs in all directions to allow completion of all functional activities.  4. The patient will be able to complete overhead lifting required vocationally without pain or difficulty to allow full return to work.     Plan  Patient would benefit from: skilled physical therapy  Planned modality interventions: cryotherapy, hydrotherapy and unattended electrical stimulation  Planned therapy interventions: therapeutic exercise, therapeutic activities, stretching, strengthening, postural training, patient education, neuromuscular re-education, manual therapy, joint mobilization, activity modification, flexibility, functional ROM exercises, home exercise program, ADL retraining, ADL training and IASTM  Frequency: 2x week  Duration in weeks: 8  Plan of Care beginning date: 4/17/2024  Plan of Care expiration date: 6/12/2024  Treatment plan discussed with: patient      Subjective Evaluation    History of Present Illness  Mechanism of injury: Shantanu presents for re-eval following Bankart repair on 12/22/23. He did have a follow up with the surgeon on 4/15/24 where they reviewed his recent MRI results. MRI was performed due to patient reports of instability and pain in the shoulder. The results did not show a re-tear of the labrum and/or rotator cuff. He was referred to Occupational Medicine for further management of his care. Overall he reports that his shoulder is 30% back to it's PLOF. He says that he gets a sharp pain and has difficulty with externally rotating the shoulder, lifting his arm fully overhead, and behind his back to pull up his pants or reach his wallet. He says that he has been doing his band strengthening exercises at home and says that these are going well, although does have pain with  "shoulder ER. He has been doing light lifting of 10lbs at home as these were his restrictions from orthopedics previously. He says that other than this he hasn't done much lifting. He notes continued weakness in the shoulder with everyday activities. For example, the other day he was trying to close up a dog crate and he had a lot of pain in the arm after doing this and couldn't move the shoulder for 2 days. He says that because he still has lifting restrictions, his job will not allow him to return to work until he is fully cleared.       Of note, he also injured his lower back, right leg, and neck in the initial fall at work. He is seeing [facility] for this and recently received [place] in the neck 2 weeks ago.                     Patient Goals  Patient goal: \"To get the movement and strength back.\"  Pain  Current pain ratin  At best pain ratin  At worst pain ratin  Quality: throbbing and sharp  Aggravating factors: lifting and overhead activity (getting dressed, changing kids diapers)    Social Support    Employment status: working (- OOW at this time)  Hand dominance: right    Treatments  Previous treatment: physical therapy      Objective     Active Range of Motion   Left Shoulder   Flexion: 130 degrees   Extension: 65 degrees   Abduction: 110 degrees   External rotation 0°: 85 degrees   Internal rotation BTB: T7     Right Shoulder   Flexion: 90 degrees with pain  Abduction: 87 degrees with pain  External rotation BTH: Active external rotation behind the head: R ear- neck pain. with pain  Internal rotation BTB: Active internal rotation behind the back: R glute. with pain    Passive Range of Motion     Right Shoulder   Flexion: 90 degrees with pain  Abduction: 90 degrees with pain  External rotation 45°: 52 degrees with pain  Internal rotation 45°: 60 degrees with pain    Additional Passive Range of Motion Details  Empty End feel with all shoulder ROM     Strength/Myotome Testing " "    Left Shoulder   Normal muscle strength    Right Shoulder     Planes of Motion   Flexion: 3+   Abduction: 3+   External rotation at 0°: 3+   Internal rotation at 0°: 3+     Additional Strength Details  Pain with all R shoulder MMTs             Precautions: asthma, history of chronic right shoulder pain, recent R labral repair   Access Code: [email or web address]  URL: [email or web address]  Date: 04/17/2024  Prepared by: Princess Carl    Exercises  - Seated Shoulder Flexion Towel Slide at Table Top Full Range of Motion  - 1 x daily - 7 x weekly - 2 sets - 10 reps - 5 sec hold  - Seated Shoulder Scaption Slide at Table Top with Forearm in Neutral  - 1 x daily - 7 x weekly - 2 sets - 10 reps - 5 sec hold  - Seated Shoulder External Rotation PROM on Table  - 1 x daily - 7 x weekly - 2 sets - 10 reps - 5 sec hold  - Isometric Shoulder Flexion at Wall  - 1 x daily - 7 x weekly - 1 sets - 10 reps - 5 sec hold  - Isometric Shoulder Abduction at Wall  - 1 x daily - 7 x weekly - 1 sets - 10 reps - 5 sec hold  - Standing Isometric Shoulder Internal Rotation at Doorway  - 1 x daily - 7 x weekly - 1 sets - 10 reps - 5 sec hold  - Isometric Shoulder External Rotation at Wall  - 1 x daily - 7 x weekly - 1 sets - 10 reps - 5 sec hold    POC expires Unit limit Auth Expiration date PT/OT/ST + Visit Limit?   6/12/24 BOMN N/A WC insurance only, BOMN    Visit/Unit Tracking  AUTH Status:  Date 4/17              BOMN Used 12               Remaining                    Manuals 2/26 2/28 3/14 3/21 3/28  4/17  2/2 2/6 2/15   Light Scap ROM         TS     R shoulder PROM per protocol TS SC SC gentle  BE  <90 degrees SC  BE  TS                    Re-eval      BE   TS    Neuro Re-Ed             Webslide M, L BTB 3x10  BTB 3x 10  Hold  held BTB 3x 10    3x10 RTB  3x10 GTB   Submaximal shoulder isometrics- flex, abd, ER, IR      5\"x10 ea                     Ther Ex             Pt Education      PT POC, HEP  TS - precautions, progressions, HEP " " TS - protocol, HEP    UBE for posture and cardio 3'/3' trial Hold  3'/3' 3'/3' Hold         Pulleys 5' 5 min  5 min  5 min 4 min     5' 5'   Finger ladder ecc. control 15x5\"   15x5\" 15x 5\"         Table slides flex, scapt       5\" 2x10        Seated ER stretch      5\"2x10       Supine cane flexion stretch             TB ER iso walk outs             TB IR iso walk outs             Standing shoulder 3 way raises                          Ther Activity                                       Gait Training                                       Modalities               TENS- MWR- R shld              CP- R shld                             "

## 2024-04-22 ENCOUNTER — OFFICE VISIT (OUTPATIENT)
Dept: PHYSICAL THERAPY | Facility: CLINIC | Age: 34
End: 2024-04-22
Payer: OTHER MISCELLANEOUS

## 2024-04-22 DIAGNOSIS — S43.431D TEAR OF RIGHT GLENOID LABRUM, SUBSEQUENT ENCOUNTER: Primary | ICD-10-CM

## 2024-04-22 PROCEDURE — 97110 THERAPEUTIC EXERCISES: CPT

## 2024-04-22 NOTE — PROGRESS NOTES
Daily Note    Today's date: 24  Patient name: Shantanu Sebastian  : 1990  MRN: [id number]  Referring provider: Earl Easley*  Dx:   Encounter Diagnosis     ICD-10-CM    1. Tear of right glenoid labrum, subsequent encounter  S43.431D           Start Time: 1700  Stop Time: 1745  Total time in clinic (min): 45 minutes      Subjective: Shantanu reports that he would prefer not to do any overhead activities today due to significant pain.    Objective: See treatment diary below.    Assessment: Shantanu tolerated treatment well with consistent cuing throughout. TE's were performed with the same resistance and reps. No new TE's were performed today. Following treatment, the patient demonstrated fatigue and would benefit from continued physical therapy.    Plan: Continue per plan of care.  Progress treatment as tolerated.         Precautions: asthma, history of chronic right shoulder pain, recent R labral repair   Access Code: [email or web address]  URL: [email or web address]  Date: 2024  Prepared by: Princess Carl    Exercises  - Seated Shoulder Flexion Towel Slide at Table Top Full Range of Motion  - 1 x daily - 7 x weekly - 2 sets - 10 reps - 5 sec hold  - Seated Shoulder Scaption Slide at Table Top with Forearm in Neutral  - 1 x daily - 7 x weekly - 2 sets - 10 reps - 5 sec hold  - Seated Shoulder External Rotation PROM on Table  - 1 x daily - 7 x weekly - 2 sets - 10 reps - 5 sec hold  - Isometric Shoulder Flexion at Wall  - 1 x daily - 7 x weekly - 1 sets - 10 reps - 5 sec hold  - Isometric Shoulder Abduction at Wall  - 1 x daily - 7 x weekly - 1 sets - 10 reps - 5 sec hold  - Standing Isometric Shoulder Internal Rotation at Doorway  - 1 x daily - 7 x weekly - 1 sets - 10 reps - 5 sec hold  - Isometric Shoulder External Rotation at Wall  - 1 x daily - 7 x weekly - 1 sets - 10 reps - 5 sec hold    POC expires Unit limit Auth Expiration date PT/OT/ST + Visit Limit?   24 BOMN N/A WC insurance  "only, BOMN    Visit/Unit Tracking  AUTH Status:  Date 4/17 4/22             BOMN Used 12 13              Remaining                    Manuals 2/26 2/28 3/14 3/21 3/28  4/17 4/22  2/6 2/15   Light Scap ROM              R shoulder PROM per protocol TS SC SC gentle  BE  <90 degrees SC  BE TS                     Re-eval      BE   TS    Neuro Re-Ed             Webslide M, L BTB 3x10  BTB 3x 10  Hold  held BTB 3x 10      3x10 GTB   Submaximal shoulder isometrics- flex, abd, ER, IR      5\"x10 ea                     Ther Ex             Pt Education      PT POC, HEP   TS - protocol, HEP    UBE for posture and cardio 3'/3' trial Hold  3'/3' 3'/3' Hold   decl      Pulleys 5' 5 min  5 min  5 min 4 min   decl  5' 5'   Finger ladder ecc. control 15x5\"   15x5\" 15x 5\"         Table slides flex, scapt       5\" 2x10  20x5\"       Seated ER stretch      5\"2x10 20x5\"       Supine cane flexion stretch             TB ER iso walk outs             TB IR iso walk outs             Standing shoulder 3 way raises             UE ranger S/L flex       3'      UE ranger abduction       3'      Ther Activity                                       Gait Training                                       Modalities               TENS- MWR- R giorgi              CP- R giorgi Wang, PT  4/22/2024,7:10 PM  "

## 2024-04-24 ENCOUNTER — APPOINTMENT (OUTPATIENT)
Dept: PHYSICAL THERAPY | Facility: CLINIC | Age: 34
End: 2024-04-24
Payer: OTHER MISCELLANEOUS

## 2024-04-25 ENCOUNTER — APPOINTMENT (OUTPATIENT)
Dept: PHYSICAL THERAPY | Facility: CLINIC | Age: 34
End: 2024-04-25
Payer: OTHER MISCELLANEOUS

## 2024-04-25 DIAGNOSIS — S43.431D TEAR OF RIGHT GLENOID LABRUM, SUBSEQUENT ENCOUNTER: Primary | ICD-10-CM

## 2024-04-25 NOTE — PROGRESS NOTES
Daily Note     Today's date: 2024  Patient name: Shantanu Sebastian  : 1990  MRN: [id number]  Referring provider: Earl Easley*  Dx:   Encounter Diagnosis     ICD-10-CM    1. Tear of right glenoid labrum, subsequent encounter  S43.431D                      Subjective: ***      Objective: See treatment diary below      Assessment: Tolerated treatment well. Patient demonstrated fatigue post treatment and would benefit from continued PT.       Plan: Continue per plan of care.  Progress treatment as tolerated.       Precautions: asthma, history of chronic right shoulder pain, recent R labral repair   Access Code: [email or web address]  URL: [email or web address]  Date: 2024  Prepared by: Princess Carl    Exercises  - Seated Shoulder Flexion Towel Slide at Table Top Full Range of Motion  - 1 x daily - 7 x weekly - 2 sets - 10 reps - 5 sec hold  - Seated Shoulder Scaption Slide at Table Top with Forearm in Neutral  - 1 x daily - 7 x weekly - 2 sets - 10 reps - 5 sec hold  - Seated Shoulder External Rotation PROM on Table  - 1 x daily - 7 x weekly - 2 sets - 10 reps - 5 sec hold  - Isometric Shoulder Flexion at Wall  - 1 x daily - 7 x weekly - 1 sets - 10 reps - 5 sec hold  - Isometric Shoulder Abduction at Wall  - 1 x daily - 7 x weekly - 1 sets - 10 reps - 5 sec hold  - Standing Isometric Shoulder Internal Rotation at Doorway  - 1 x daily - 7 x weekly - 1 sets - 10 reps - 5 sec hold  - Isometric Shoulder External Rotation at Wall  - 1 x daily - 7 x weekly - 1 sets - 10 reps - 5 sec hold    POC expires Unit limit Auth Expiration date PT/OT/ST + Visit Limit?   24 BOMN N/A WC insurance only, BOMN    Visit/Unit Tracking  AUTH Status:  Date             BOMN Used 12 13 12             Remaining                    Manuals 2/26 2/28 3/14 3/21 3/28  4/17 4/22 4/25  2/15   Light Scap ROM              R shoulder PROM per protocol TS SC SC gentle  BE  <90 degrees SC  BE TS                   "   Re-eval      BE       Neuro Re-Ed             Julianne M, L BTB 3x10  BTB 3x 10  Hold  held BTB 3x 10      3x10 GTB   Submaximal shoulder isometrics- flex, abd, ER, IR      5\"x10 ea                     Ther Ex             Pt Education      PT POC, HEP       UBE for posture and cardio 3'/3' trial Hold  3'/3' 3'/3' Hold   decl      Pulleys 5' 5 min  5 min  5 min 4 min   decl   5'   Finger ladder ecc. control 15x5\"   15x5\" 15x 5\"         Table slides flex, scapt       5\" 2x10  20x5\"       Seated ER stretch      5\"2x10 20x5\"       Supine cane flexion stretch             TB ER iso walk outs             TB IR iso walk outs             Standing shoulder 3 way raises             UE ranger S/L flex       3'      UE ranger abduction       3'      Ther Activity                                       Gait Training                                       Modalities               TENS- MWR- R shld              CP- R shld                               "

## 2024-04-25 NOTE — PROGRESS NOTES
Daily Note     Today's date: 2024  Patient name: Shantanu Sebastian  : 1990  MRN: [id number]  Referring provider: Earl Easley*  Dx:   Encounter Diagnosis     ICD-10-CM    1. Tear of right glenoid labrum, subsequent encounter  S43.431D                      Subjective: Patient reports that he has been completing his stretches at home and says that with doing this he feels his shoulder is less tight and his motion is improving.       Objective: See treatment diary below      Assessment: Tolerated treatment well. Mild pain with shoulder abduction AROM with flexionator and seated table slides therefore advised to complete within smaller ROM. Able to complete full shoulder flexion AROM in sidelying position with use of ranger. Much improved tolerance to PROM compared to previous sessions. Patient demonstrated fatigue post treatment, exhibited good technique with therapeutic exercises, and would benefit from continued PT      Plan: Continue per plan of care.      Precautions: asthma, history of chronic right shoulder pain, recent R labral repair   Access Code: [email or web address]  URL: [email or web address]  Date: 2024  Prepared by: Princess Carl    Exercises  - Seated Shoulder Flexion Towel Slide at Table Top Full Range of Motion  - 1 x daily - 7 x weekly - 2 sets - 10 reps - 5 sec hold  - Seated Shoulder Scaption Slide at Table Top with Forearm in Neutral  - 1 x daily - 7 x weekly - 2 sets - 10 reps - 5 sec hold  - Seated Shoulder External Rotation PROM on Table  - 1 x daily - 7 x weekly - 2 sets - 10 reps - 5 sec hold  - Isometric Shoulder Flexion at Wall  - 1 x daily - 7 x weekly - 1 sets - 10 reps - 5 sec hold  - Isometric Shoulder Abduction at Wall  - 1 x daily - 7 x weekly - 1 sets - 10 reps - 5 sec hold  - Standing Isometric Shoulder Internal Rotation at Doorway  - 1 x daily - 7 x weekly - 1 sets - 10 reps - 5 sec hold  - Isometric Shoulder External Rotation at Wall  - 1 x daily - 7 x  "weekly - 1 sets - 10 reps - 5 sec hold    POC expires Unit limit Auth Expiration date PT/OT/ST + Visit Limit?   6/12/24 BOMN N/A WC insurance only, BOMN    Visit/Unit Tracking  AUTH Status:  Date 4/17 4/22 4/26            BOMN Used 12 13 14             Remaining                    Manuals 2/26 2/28 3/14 3/21 3/28  4/17 4/22 4/26  2/15   Light Scap ROM              R shoulder PROM per protocol TS SC SC gentle  BE  <90 degrees SC  BE TS BE                    Re-eval      BE       Neuro Re-Ed             Webslide M, L BTB 3x10  BTB 3x 10  Hold  held BTB 3x 10      3x10 GTB   Submaximal shoulder isometrics- flex, abd, ER, IR      5\"x10 ea                     Ther Ex             Pt Education      PT POC, HEP       UBE for posture and cardio 3'/3' trial Hold  3'/3' 3'/3' Hold   decl      Pulleys 5' 5 min  5 min  5 min 4 min   decl 5'  5'   Finger ladder ecc. control 15x5\"   15x5\" 15x 5\"         Table slides flex, scapt       5\" 2x10  20x5\"  5\"x20     Seated ER stretch      5\"2x10 20x5\"  5\"x20     Supine cane flexion stretch             TB ER iso walk outs             TB IR iso walk outs             Standing shoulder 3 way raises             UE ranger S/L flex       3' 20x      UE ranger abduction       3' 3' cw/ccw in abduction                  Ther Activity                                       Gait Training                                       Modalities               TENS- MWR- R shld              CP- R shld                                 "

## 2024-04-26 ENCOUNTER — OFFICE VISIT (OUTPATIENT)
Dept: PHYSICAL THERAPY | Facility: CLINIC | Age: 34
End: 2024-04-26
Payer: OTHER MISCELLANEOUS

## 2024-04-26 DIAGNOSIS — S43.431D TEAR OF RIGHT GLENOID LABRUM, SUBSEQUENT ENCOUNTER: Primary | ICD-10-CM

## 2024-04-26 PROCEDURE — 97110 THERAPEUTIC EXERCISES: CPT

## 2024-04-26 PROCEDURE — 97140 MANUAL THERAPY 1/> REGIONS: CPT

## 2024-04-29 ENCOUNTER — EVALUATION (OUTPATIENT)
Dept: PHYSICAL THERAPY | Facility: CLINIC | Age: 34
End: 2024-04-29
Payer: OTHER MISCELLANEOUS

## 2024-04-29 DIAGNOSIS — S43.431D TEAR OF RIGHT GLENOID LABRUM, SUBSEQUENT ENCOUNTER: Primary | ICD-10-CM

## 2024-04-29 PROCEDURE — 97112 NEUROMUSCULAR REEDUCATION: CPT

## 2024-04-29 PROCEDURE — 97140 MANUAL THERAPY 1/> REGIONS: CPT

## 2024-04-29 NOTE — PROGRESS NOTES
PT Re-Evaluation     Today's date: 2024  Patient name: Shantanu Sebastian  : 1990  MRN: [id number]  Referring provider: Earl Easley*  Dx:   Encounter Diagnosis     ICD-10-CM    1. Tear of right glenoid labrum, subsequent encounter  S43.431D           Start Time: 1030  Stop Time: 1116  Total time in clinic (min): 46 minutes    Assessment  Assessment details: Shantanu Sebastian is a pleasant 33 y.o. male who presents today for a re-evaluation of his shoulder and neck. Shantanu complains of aching, sharp, and tight pain in the neck ranging from 3/10 to 10/10. Pain is exacerbated by activity or sleep and made better by medication or rest.    The patient demonstrates moderately decreased strength during resisted muscle testing, which has improved from initial evaluation. Pt ROM is minimally decreased with pain at end ranges.    The patient has difficulty with leisure, sleeping, and athletics. Patient will benefit from continued skilled physical therapy, including therapeutic exercise, stretching, manual therapy, and modalities prn to improve their level of function, to increase overall quality of life, and to address his impairments.    Shantanu has met some of his goals at this time. Pt was instructed on his updated plan of care and wishes to continue therapy. Shantanu was educated on the risks of vertebrobasilar insufficiency, as well the signs and symptoms to monitor. He was avdised that if any of his symptoms including the 5 D's and 3 N's worsen to seek emergency medical attention.    Impairments: abnormal or restricted ROM, activity intolerance, impaired physical strength, lacks appropriate home exercise program and pain with function  Understanding of Dx/Px/POC: good   Prognosis: good    Goals  STG within 4 weeks:   1. Patient to be independent in basic HEP. MET  2. Reduce pain by 50% to improve quality of life. - NOT MET  3. Improve PROM flexion to 90*. - GOAL MET       LTG within 8 weeks: ALL NOT  "MET-PROGRESSING   1. Patient to be independent in ADLs/IADLs without difficulty.   2. The patient will demonstrate WFL shoulder ROM in all directions to allow completion of all functional activities.   3. The patient will demonstrate WFL shoulder MMTs in all directions to allow completion of all functional activities.  4. The patient will be able to complete overhead lifting required vocationally without pain or difficulty to allow full return to work.     Plan  Patient would benefit from: skilled physical therapy  Planned modality interventions: cryotherapy, hydrotherapy and unattended electrical stimulation  Planned therapy interventions: therapeutic exercise, therapeutic activities, stretching, strengthening, postural training, patient education, neuromuscular re-education, manual therapy, joint mobilization, activity modification, flexibility, functional ROM exercises, home exercise program, ADL retraining, ADL training and IASTM  Frequency: 2x week  Duration in weeks: 8  Plan of Care beginning date: 4/29/2024  Plan of Care expiration date: 6/28/2024  Treatment plan discussed with: patient    Subjective Evaluation    History of Present Illness  Mechanism of injury: Shantanu reports that he is feeling better since starting therapy a few months ago. He is adding his neck for physical therapy today. He notes neck pain ranging from 3/10 to 10/10. He notes numbness in both hands, worse on the R.     he notes significant difficulty with his usual tasks including looking side to side while driving. He has significant difficulty with sleeping, and gets about 2 to 3 hours of sleep a night.    He does not new onset of dizziness that worsened since he started getting shots in his neck on April 5th.    Of note, he had a mild heart attack last month that caused him to faint. He will be getting a stent in his heart soon.    Patient Goals  Patient goal: \"To get the movement and strength back.\"  Pain  Current pain rating: 3  At " "best pain ratin  At worst pain rating: 10  Quality: throbbing and sharp  Aggravating factors: lifting and overhead activity (getting dressed, changing kids diapers)    Social Support    Employment status: working (- OOW at this time)  Hand dominance: right    Treatments  Previous treatment: physical therapy    Objective     Concurrent Complaints  Positive for night pain, disturbed sleep, dizziness (pain in the neck causes this, along with headaches, \"sees colors and lights\"), faints (fainted one month ago, had a mild heart attack one month ago), headaches (constant, blurry vision, nauseous), nausea/motion sickness, difficulty breathing (asthma), shortness of breath (asthma) and visual change (blurriness, worse since he started getting shots ). Negative for tinnitus, trouble swallowing and respiratory pain    Active Range of Motion   Left Shoulder   Flexion: 130 degrees   Extension: 65 degrees   Abduction: 110 degrees   External rotation 0°: 85 degrees   Internal rotation BTB: T7     Right Shoulder   Flexion: 90 degrees with pain  Abduction: 87 degrees with pain  External rotation BTH: Active external rotation behind the head: R ear- neck pain. with pain  Internal rotation BTB: Active internal rotation behind the back: R glute. with pain    Passive Range of Motion     Right Shoulder   Flexion: 90 degrees with pain  Abduction: 90 degrees with pain  External rotation 45°: 52 degrees with pain  Internal rotation 45°: 60 degrees with pain    Additional Passive Range of Motion Details  Empty End feel with all shoulder ROM     Strength/Myotome Testing     Left Shoulder   Normal muscle strength    Right Shoulder     Planes of Motion   Flexion: 3+   Abduction: 3+   External rotation at 0°: 3+   Internal rotation at 0°: 3+     Additional Strength Details  2024  R - 70lbs  L - 105lbs      Tests   Cervical   Positive neck flexor muscle endurance test.  Negative vertical compression, Lhermitte's sign and " "VBI.     Left   Negative Spurling's Test A.     Right   Negative Spurling's Test A.     Lumbar   Negative vertical compression.     Additional Tests Details  5 D's, 3 N's for VBI  Dysarthria - No  Diplopia - Yes  Drop Attacks - Yes  Dysphagia - No  Dizzines - Yes    Numbness - Yes  Nystagmus - No  Nausea - Yes  Neuro Exam:     Headaches   Patient reports headaches: Yes (constant, blurry vision, nauseous).            Precautions: asthma, history of chronic right shoulder pain, recent R labral repair   Access Code: [email or web address]  URL: [email or web address]  Date: 04/17/2024  Prepared by: Princess Carl    POC expires Unit limit Auth Expiration date PT/OT/ST + Visit Limit?   6/12/24 BOMN N/A WC insurance only, BOMN    Visit/Unit Tracking  AUTH Status:  Date 4/17 4/22 4/26 4/29           BOMN Used 12 13 14 15            Remaining                    Manuals 2/26 2/28 3/14 3/21 3/28  4/17 4/22 4/26 4/29    Light Scap ROM              R shoulder PROM per protocol TS SC SC gentle  BE  <90 degrees SC  BE TS BE                  Re-eval      BE   TS - neck    Neuro Re-Ed             Cervical 4 way isometric hold         10x10\" dominik Condelide M, L BTB 3x10  BTB 3x 10  Hold  held BTB 3x 10         Submaximal shoulder isometrics- flex, abd, ER, IR      5\"x10 ea                     Ther Ex             Pt Education      PT POC, HEP       UBE for posture and cardio 3'/3' trial Hold  3'/3' 3'/3' Hold   decl      Pulleys 5' 5 min  5 min  5 min 4 min   decl 5'     Finger ladder ecc. control 15x5\"   15x5\" 15x 5\"         Table slides flex, scapt       5\" 2x10  20x5\"  5\"x20     Seated ER stretch      5\"2x10 20x5\"  5\"x20     Supine cane flexion stretch             TB ER iso walk outs             TB IR iso walk outs             Standing shoulder 3 way raises             UE ranger S/L flex       3' 20x      UE ranger abduction       3' 3' cw/ccw in abduction                  Ther Activity                     "                   Gait Training                                       Modalities             TENS- MWR- R shld              CP- R shld

## 2024-04-29 NOTE — LETTER
2024    Jeff Christine MD  [address] [facility] [address]    Patient: Shantanu Sebastian   YOB: 1990   Date of Visit: 2024     Encounter Diagnosis     ICD-10-CM    1. Tear of right glenoid labrum, subsequent encounter  S43.431D           Dear Dr. Christine:    Thank you for your recent referral of Shantanu Sebastian. Please review the attached evaluation summary from Shantanu's recent visit.     Please verify that you agree with the plan of care by signing the attached order.     If you have any questions or concerns, please do not hesitate to call.     I sincerely appreciate the opportunity to share in the care of one of your patients and hope to have another opportunity to work with you in the near future.       Sincerely,    Terry Wang, PT      Referring Provider:      I certify that I have read the below Plan of Care and certify the need for these services furnished under this plan of treatment while under my care.                    Jeff Christine MD  [address] [facility] [address]  Via Fax: [phone]          PT Re-Evaluation     Today's date: 2024  Patient name: Shantanu Sebastian  : 1990  MRN: [id number]  Referring provider: Earl Easley*  Dx:   Encounter Diagnosis     ICD-10-CM    1. Tear of right glenoid labrum, subsequent encounter  S43.431D           Start Time: 1030  Stop Time: 1115  Total time in clinic (min): 45 minutes    Assessment  Assessment details: Shantanu Sebastian is a pleasant 33 y.o. male who presents today for a re-evaluation of his shoulder and neck. Shantanu complains of aching, sharp, and tight pain in the neck ranging from 3/10 to 10/10. Pain is exacerbated by activity or sleep and made better by medication or rest.    The patient demonstrates moderately decreased strength during resisted muscle testing, which has improved from initial evaluation. Pt ROM is minimally decreased with pain at end ranges.    The patient has  difficulty with leisure, sleeping, and athletics. Patient will benefit from continued skilled physical therapy, including therapeutic exercise, stretching, manual therapy, and modalities prn to improve their level of function, to increase overall quality of life, and to address his impairments.    Shantanu has met some of his goals at this time. Pt was instructed on his updated plan of care and wishes to continue therapy. Shantanu was educated on the risks of vertebrobasilar insufficiency, as well the signs and symptoms to monitor. He was avdised that if any of his symptoms including the 5 D's and 3 N's worsen to seek emergency medical attention.    Impairments: abnormal or restricted ROM, activity intolerance, impaired physical strength, lacks appropriate home exercise program and pain with function  Understanding of Dx/Px/POC: good   Prognosis: good    Goals  STG within 4 weeks:   1. Patient to be independent in basic HEP. MET  2. Reduce pain by 50% to improve quality of life. - NOT MET  3. Improve PROM flexion to 90*. - GOAL MET       LTG within 8 weeks: ALL NOT MET-PROGRESSING   1. Patient to be independent in ADLs/IADLs without difficulty.   2. The patient will demonstrate WFL shoulder ROM in all directions to allow completion of all functional activities.   3. The patient will demonstrate WFL shoulder MMTs in all directions to allow completion of all functional activities.  4. The patient will be able to complete overhead lifting required vocationally without pain or difficulty to allow full return to work.     Plan  Patient would benefit from: skilled physical therapy  Planned modality interventions: cryotherapy, hydrotherapy and unattended electrical stimulation  Planned therapy interventions: therapeutic exercise, therapeutic activities, stretching, strengthening, postural training, patient education, neuromuscular re-education, manual therapy, joint mobilization, activity modification, flexibility, functional  "ROM exercises, home exercise program, ADL retraining, ADL training and IASTM  Frequency: 2x week  Duration in weeks: 8  Plan of Care beginning date: 2024  Plan of Care expiration date: 2024  Treatment plan discussed with: patient    Subjective Evaluation    History of Present Illness  Mechanism of injury: Shantanu reports that he is feeling better since starting therapy a few months ago. He is adding his neck for physical therapy today. He notes neck pain ranging from 3/10 to 10/10. He notes numbness in both hands, worse on the R.     he notes significant difficulty with his usual tasks including looking side to side while driving. He has significant difficulty with sleeping, and gets about 2 to 3 hours of sleep a night.    He does not new onset of dizziness that worsened since he started getting shots in his neck on .    Of note, he had a mild heart attack last month that caused him to faint. He will be getting a stent in his heart soon.    Patient Goals  Patient goal: \"To get the movement and strength back.\"  Pain  Current pain rating: 3  At best pain ratin  At worst pain rating: 10  Quality: throbbing and sharp  Aggravating factors: lifting and overhead activity (getting dressed, changing kids diapers)    Social Support    Employment status: working (- OOW at this time)  Hand dominance: right    Treatments  Previous treatment: physical therapy    Objective     Concurrent Complaints  Positive for night pain, disturbed sleep, dizziness (pain in the neck causes this, along with headaches, \"sees colors and lights\"), faints (fainted one month ago, had a mild heart attack one month ago), headaches (constant, blurry vision, nauseous), nausea/motion sickness, difficulty breathing (asthma), shortness of breath (asthma) and visual change (blurriness, worse since he started getting shots ). Negative for tinnitus, trouble swallowing and respiratory pain    Active Range of Motion   Left " Shoulder   Flexion: 130 degrees   Extension: 65 degrees   Abduction: 110 degrees   External rotation 0°: 85 degrees   Internal rotation BTB: T7     Right Shoulder   Flexion: 90 degrees with pain  Abduction: 87 degrees with pain  External rotation BTH: Active external rotation behind the head: R ear- neck pain. with pain  Internal rotation BTB: Active internal rotation behind the back: R glute. with pain    Passive Range of Motion     Right Shoulder   Flexion: 90 degrees with pain  Abduction: 90 degrees with pain  External rotation 45°: 52 degrees with pain  Internal rotation 45°: 60 degrees with pain    Additional Passive Range of Motion Details  Empty End feel with all shoulder ROM     Strength/Myotome Testing     Left Shoulder   Normal muscle strength    Right Shoulder     Planes of Motion   Flexion: 3+   Abduction: 3+   External rotation at 0°: 3+   Internal rotation at 0°: 3+     Additional Strength Details  4/29/2024  R - 70lbs  L - 105lbs      Tests   Cervical   Positive neck flexor muscle endurance test.  Negative vertical compression, Lhermitte's sign and VBI.     Left   Negative Spurling's Test A.     Right   Negative Spurling's Test A.     Lumbar   Negative vertical compression.     Additional Tests Details  5 D's, 3 N's for VBI  Dysarthria - No  Diplopia - Yes  Drop Attacks - Yes  Dysphagia - No  Dizzines - Yes    Numbness - Yes  Nystagmus - No  Nausea - Yes  Neuro Exam:     Headaches   Patient reports headaches: Yes (constant, blurry vision, nauseous).            Precautions: asthma, history of chronic right shoulder pain, recent R labral repair   Access Code: [email or web address]  URL: [email or web address]  Date: 04/17/2024  Prepared by: Princess Carl    POC expires Unit limit Auth Expiration date PT/OT/ST + Visit Limit?   6/12/24 BOMN N/A WC insurance only, BOMN    Visit/Unit Tracking  AUTH Status:  Date 4/17 4/22 4/26 4/29           BOMN Used 12 13 14 15            Remaining               "      Manuals 2/26 2/28 3/14 3/21 3/28  4/17 4/22 4/26 4/29    Light Scap ROM              R shoulder PROM per protocol TS SC SC gentle  BE  <90 degrees SC  BE TS BE                  Re-eval      BE   TS - neck    Neuro Re-Ed             Cervical 4 way isometric hold         10x10\" dominik                              Webslide M, L BTB 3x10  BTB 3x 10  Hold  held BTB 3x 10         Submaximal shoulder isometrics- flex, abd, ER, IR      5\"x10 ea                     Ther Ex             Pt Education      PT POC, HEP       UBE for posture and cardio 3'/3' trial Hold  3'/3' 3'/3' Hold   decl      Pulleys 5' 5 min  5 min  5 min 4 min   decl 5'     Finger ladder ecc. control 15x5\"   15x5\" 15x 5\"         Table slides flex, scapt       5\" 2x10  20x5\"  5\"x20     Seated ER stretch      5\"2x10 20x5\"  5\"x20     Supine cane flexion stretch             TB ER iso walk outs             TB IR iso walk outs             Standing shoulder 3 way raises             UE ranger S/L flex       3' 20x      UE ranger abduction       3' 3' cw/ccw in abduction                  Ther Activity                                       Gait Training                                       Modalities             TENS- MWR- R shld              CP- R shld                                    "

## 2024-05-03 ENCOUNTER — APPOINTMENT (OUTPATIENT)
Dept: PHYSICAL THERAPY | Facility: CLINIC | Age: 34
End: 2024-05-03
Payer: OTHER MISCELLANEOUS

## 2024-05-06 ENCOUNTER — APPOINTMENT (OUTPATIENT)
Dept: PHYSICAL THERAPY | Facility: CLINIC | Age: 34
End: 2024-05-06
Payer: OTHER MISCELLANEOUS

## 2024-05-06 NOTE — PROGRESS NOTES
"Daily Note     Today's date: 2024  Patient name: Shantanu Sebastian  : 1990  MRN: [id number]  Referring provider: Earl Easley*  Dx:   Encounter Diagnosis     ICD-10-CM    1. Tear of right glenoid labrum, subsequent encounter  S43.431D                      Subjective: Patient states he has his RENATO for his shoulder coming up in a couple of weeks.  Neck is stiff and sore this morning from driving a lot yesterday.    Objective: See treatment diary below      Assessment: Tolerated treatment well. Patient demonstrated fatigue post treatment and would benefit from continued PT.  Neck pain decreased with trial of cervical traction.  Shoulder painful with all motions.  End-range pain with AROM sidelying ER.      Plan: Continue per plan of care.  Progress treatment as tolerated.       Precautions: asthma, history of chronic right shoulder pain, recent R labral repair   Access Code: [email or web address]  URL: [email or web address]  Date: 2024  Prepared by: Princess Carl    POC expires Unit limit Auth Expiration date PT/OT/ST + Visit Limit?   24 BOMN N/A WC insurance only, BOMN    Visit/Unit Tracking  AUTH Status:  Date           BOMN Used 12 13 14 15 16           Remaining                    Manuals 2/26 2/28 3/14 3/21 3/28    4/17 4/22 4/26 4/29 5/7   Light Scap ROM              R shoulder PROM per protocol TS SC SC gentle  BE  <90 degrees SC  BE TS BE     Loaded moderate cervical retractions          X10 no change   Cervical traction          JF                Re-eval      BE   TS - neck    Neuro Re-Ed             Cervical 4 way isometric hold           10x10\" ea                              Webslide M, L BTB 3x10  BTB 3x 10  Hold  held BTB 3x 10         Submaximal shoulder isometrics- flex, abd, ER, IR      5\"x10 ea                     Ther Ex             Pt Education      PT POC, HEP       UBE for posture and cardio 3'/3' trial Hold  3'/3' 3'/3' Hold   decl    " "  Pulleys 5' 5 min  5 min  5 min 4 min   decl 5'  5'   Finger ladder ecc. control 15x5\"   15x5\" 15x 5\"         Table slides flex, scapt       5\" 2x10  20x5\"  5\"x20  5\"x20   Seated ER stretch      5\"2x10 20x5\"  5\"x20  5\"x20   Supine cane flexion stretch             TB ER iso walk outs             TB IR iso walk outs             Standing shoulder 3 way raises             UE ranger S/L flex       3' 20x   3'   UE ranger abduction       3' 3' cw/ccw in abduction  3' cw/ccw in abduction   SL shld ER          2x10   Ther Activity                                       Gait Training                                       Modalities             TENS- MWR- R shld              CP- R shld                   "

## 2024-05-07 ENCOUNTER — OFFICE VISIT (OUTPATIENT)
Dept: PHYSICAL THERAPY | Facility: CLINIC | Age: 34
End: 2024-05-07
Payer: OTHER MISCELLANEOUS

## 2024-05-07 DIAGNOSIS — S43.431D TEAR OF RIGHT GLENOID LABRUM, SUBSEQUENT ENCOUNTER: Primary | ICD-10-CM

## 2024-05-07 PROCEDURE — 97110 THERAPEUTIC EXERCISES: CPT | Performed by: PHYSICAL THERAPIST

## 2024-05-07 PROCEDURE — 97140 MANUAL THERAPY 1/> REGIONS: CPT | Performed by: PHYSICAL THERAPIST

## 2024-05-09 ENCOUNTER — APPOINTMENT (OUTPATIENT)
Dept: URGENT CARE | Age: 34
End: 2024-05-09
Payer: OTHER MISCELLANEOUS

## 2024-05-09 PROCEDURE — 99215 OFFICE O/P EST HI 40 MIN: CPT | Performed by: PREVENTIVE MEDICINE

## 2024-05-10 ENCOUNTER — APPOINTMENT (OUTPATIENT)
Dept: PHYSICAL THERAPY | Facility: CLINIC | Age: 34
End: 2024-05-10
Payer: OTHER MISCELLANEOUS

## 2024-05-13 ENCOUNTER — APPOINTMENT (OUTPATIENT)
Dept: PHYSICAL THERAPY | Facility: CLINIC | Age: 34
End: 2024-05-13
Payer: OTHER MISCELLANEOUS

## 2024-05-15 ENCOUNTER — HOSPITAL ENCOUNTER (OUTPATIENT)
Dept: NEUROLOGY | Facility: CLINIC | Age: 34
Discharge: HOME | End: 2024-05-15
Payer: OTHER MISCELLANEOUS

## 2024-05-15 ENCOUNTER — TELEPHONE (OUTPATIENT)
Dept: PAIN MEDICINE | Facility: CLINIC | Age: 34
End: 2024-05-15

## 2024-05-15 PROBLEM — M79.2 NEUROPATHIC PAIN: Status: ACTIVE | Noted: 2024-05-15

## 2024-05-15 PROCEDURE — 95910 NRV CNDJ TEST 7-8 STUDIES: CPT | Performed by: PSYCHIATRY & NEUROLOGY

## 2024-05-15 PROCEDURE — 95886 MUSC TEST DONE W/N TEST COMP: CPT | Performed by: PSYCHIATRY & NEUROLOGY

## 2024-05-15 NOTE — TELEPHONE ENCOUNTER
----- Message from Ebony Rico MD sent at 5/15/2024 11:50 AM EDT -----  EMG did not show significant findings.

## 2024-05-16 ENCOUNTER — EVALUATION (OUTPATIENT)
Dept: PHYSICAL THERAPY | Facility: CLINIC | Age: 34
End: 2024-05-16
Payer: OTHER MISCELLANEOUS

## 2024-05-16 DIAGNOSIS — S43.431D TEAR OF RIGHT GLENOID LABRUM, SUBSEQUENT ENCOUNTER: Primary | ICD-10-CM

## 2024-05-16 PROCEDURE — 97110 THERAPEUTIC EXERCISES: CPT

## 2024-05-16 PROCEDURE — 97112 NEUROMUSCULAR REEDUCATION: CPT

## 2024-05-16 NOTE — PROGRESS NOTES
"Daily Note    Today's date: 24  Patient name: Shantanu Sebastian  : 1990  MRN: [id number]  Referring provider: Earl Easley*  Dx:   Encounter Diagnosis     ICD-10-CM    1. Tear of right glenoid labrum, subsequent encounter  S43.431D           Start Time: 1215  Stop Time: 1300  Total time in clinic (min): 45 minutes      Subjective: Shantanu reports feeling better overall since last week. He has been sleeping better and has worked on improving his diet. He notes that he has not noticed his dizziness recently. He notes adherence to HEP and has a home traction unit for his neck which does help.    Objective: See treatment diary below.    Assessment: Shantanu tolerated treatment well with consistent cuing throughout. TE's were performed with increased reps and increased resistance. New TE's were demonstrated with proper technique, and tolerated well. Following treatment, the patient demonstrated fatigue and would benefit from continued physical therapy.    Plan: Continue per plan of care.  Progress treatment as tolerated.         Precautions: asthma, history of chronic right shoulder pain, recent R labral repair   Access Code: [email or web address]  URL: [email or web address]  Date: 2024  Prepared by: Princess Carl    POC expires Unit limit Auth Expiration date PT/OT/ST + Visit Limit?   24 BOMN N/A  insurance only, BOMN    Visit/Unit Tracking  AUTH Status:  Date          BOMN Used 12 13 14 15 16 17          Remaining                    Manuals 5/16  3/14 3/21 3/28  4/17 4/22 4/26 4/29 5/7   Light Scap ROM              R shoulder PROM per protocol   SC gentle  BE  <90 degrees SC  BE TS BE     Loaded moderate cervical retractions          X10 no change   Cervical traction          JF                Re-eval      BE   TS - neck    Neuro Re-Ed             Cervical 4 way isometric hold           10x10\" ea                 Prone ITY             Amayalide M, L Black 3x10   " "Hold  held BTB 3x 10                      Ther Ex             Pt Education      PT POC, HEP       UBE for posture and cardio 3'/3'   3'/3' 3'/3' Hold   decl      Pulleys 3'  5 min  5 min 4 min   decl 5'  5'   Finger ladder ecc. control    15x5\" 15x 5\"         Table slides flex, scapt       5\" 2x10  20x5\"  5\"x20  5\"x20   Seated ER stretch      5\"2x10 20x5\"  5\"x20  5\"x20   Supine cane flexion stretch             TB ER  3x10 GTB            TB IR  3x10 BTB            TB Adduction 3x10 BTB            TB Tricep ext GTB 3x15            TB Bicep curl BTB 3x12            Standing shoulder 3 way raises             1st Rib Self Mobilization 15x5\" R            SL shld ER          2x10   Ther Activity                                       Gait Training                                       Modalities             TENS- MWR- R shld              CP- R giorgi Wang, PT  5/16/2024,1:01 PM  "

## 2024-05-20 ENCOUNTER — OFFICE VISIT (OUTPATIENT)
Dept: PHYSICAL THERAPY | Facility: CLINIC | Age: 34
End: 2024-05-20
Payer: OTHER MISCELLANEOUS

## 2024-05-20 DIAGNOSIS — S43.431D TEAR OF RIGHT GLENOID LABRUM, SUBSEQUENT ENCOUNTER: Primary | ICD-10-CM

## 2024-05-20 NOTE — PROGRESS NOTES
Patient arrived for PT today and attempted to exercise. He notes that he has the flu and was not feeling well for about a week. Session ended early.

## 2024-05-22 ENCOUNTER — OFFICE VISIT (OUTPATIENT)
Dept: PAIN MEDICINE | Facility: CLINIC | Age: 34
End: 2024-05-22
Payer: OTHER MISCELLANEOUS

## 2024-05-22 ENCOUNTER — TELEPHONE (OUTPATIENT)
Dept: PAIN MEDICINE | Facility: CLINIC | Age: 34
End: 2024-05-22

## 2024-05-22 VITALS
DIASTOLIC BLOOD PRESSURE: 84 MMHG | SYSTOLIC BLOOD PRESSURE: 129 MMHG | WEIGHT: 139.4 LBS | BODY MASS INDEX: 18.88 KG/M2 | HEIGHT: 72 IN | HEART RATE: 62 BPM

## 2024-05-22 DIAGNOSIS — M25.561 CHRONIC PAIN OF RIGHT KNEE: Primary | ICD-10-CM

## 2024-05-22 DIAGNOSIS — G89.29 CHRONIC PAIN OF RIGHT KNEE: Primary | ICD-10-CM

## 2024-05-22 PROCEDURE — 99214 OFFICE O/P EST MOD 30 MIN: CPT | Performed by: STUDENT IN AN ORGANIZED HEALTH CARE EDUCATION/TRAINING PROGRAM

## 2024-05-22 NOTE — TELEPHONE ENCOUNTER
Due to verbal aggression and inappropriate conduct while in our office 05/22/2024 pt to be discharged from the practice.    I called the pt to advise, and he immediately became defensive with the nurse, states he read his office visit note and it was entirely untrue and desires to speak to the manager to file a formal complaint.    Aware I would relay this information to the

## 2024-05-22 NOTE — PROGRESS NOTES
"Assessment:  1. Chronic pain of right knee      Portions of the record may have been created with voice recognition software. Occasional wrong word or \"sound a like\" substitutions may have occurred due to the inherent limitations of voice recognition software. Read the chart carefully and recognize, using context, where substitutions have occurred. Contact me with any questions.       Plan:  33-year-old male here for follow-up visit with regards to RLE symptoms.  Since last visit, patient underwent an EMG for further evaluation of RLE symptoms, which did not show significant findings.  At this visit, patient notes pain symptoms are focused over right knee.  Notes he has been seeing [facility] for other pain issues and  notes he was \"kicked out of their office yesterday\" because he was frustrated and the doctor walked out of the room.  He notes he was asked to pick one pain provider for his issues and he would like to switch all his care to [facility].  Patient stated that a provider at [facility] had previously ordered an MRI of the lumbar spine and claims today that he was told by our office to hold off on the MRI and he is upset about this.  While trying to obtain more information about his care at [facility] and clarify patient's concerns, he became increasingly upset.  I advised patient that our office would obtain records from [facility] for further review to properly address his concerns and be able to take over care that he was receiving there for his other pain issues.  Patient started making angry statements at the physician regarding his frustration and proceeded to start recording the encounter with his phone camera. At this time, I did not feel safe or comfortable continuing this encounter with patient and proceeded to leave the exam room. Office staff provided patient a medical release form in order to facilitate transfer of records " from other provider.      History of Present Illness:  The patient is a 33 y.o. male who presents for a follow up office visit in regards to Neck Pain, Back Pain, and Knee Pain.       I have personally reviewed and/or updated the patient's past medical history, past surgical history, family history, social history, current medications, allergies, and vital signs today.     Review of Systems  Review of Systems   Constitutional:  Negative for unexpected weight change.   HENT:  Negative for hearing loss.    Eyes:  Negative for visual disturbance.   Respiratory:  Positive for shortness of breath.    Cardiovascular:  Negative for leg swelling.   Gastrointestinal:  Negative for constipation.   Endocrine: Negative for polyuria.   Genitourinary:  Negative for difficulty urinating.   Musculoskeletal:  Positive for gait problem. Negative for joint swelling and myalgias.        Pain in extremity- back, knee   Skin:  Negative for rash.   Neurological:  Negative for weakness and headaches.   Psychiatric/Behavioral:  Negative for decreased concentration.    All other systems reviewed and are negative.        Past Medical History:   Diagnosis Date    Asthma     Chronic back pain        Past Surgical History:   Procedure Laterality Date    BACK SURGERY  2019    lumbar    DISCECTOMY SPINE LUMBAR W/ ARTHROPLASTY  02/2019    L5-S1    FL INJECTION LEFT SHOULDER (ARTHROGRAM)  6/15/2022    ORTHOPEDIC SURGERY      KS SURGICAL ARTHROSCOPY SHOULDER CAPSULORRHAPHY Right 12/22/2023    Procedure: SHOULDER ARTHROSCOPIC BANKART  REPAIR, EXTENSIVE DEBRIDEMENT;  Surgeon: Earl Easley MD;  Location: [facility] MAIN OR;  Service: Orthopedics    SHOULDER SURGERY Right     2019       Family History   Problem Relation Age of Onset    No Known Problems Mother     No Known Problems Father        Social History     Occupational History    Not on file   Tobacco Use    Smoking status: Every Day     Current packs/day: 0.25     Average packs/day: 0.3  packs/day for 22.4 years (5.6 ttl pk-yrs)     Types: Cigarettes     Start date: 12/12/2001    Smokeless tobacco: Never   Vaping Use    Vaping status: Never Used   Substance and Sexual Activity    Alcohol use: Never    Drug use: Never    Sexual activity: Not on file         Current Outpatient Medications:     albuterol (PROVENTIL HFA,VENTOLIN HFA) 90 mcg/act inhaler, Inhale 2 puffs every 6 (six) hours as needed, Disp: , Rfl:     ibuprofen (MOTRIN) 600 mg tablet, every 4 (four) hours as needed, Disp: , Rfl:     amoxicillin-clavulanate (AUGMENTIN) 875-125 mg per tablet, Take 1 tablet by mouth 2 (two) times a day (Patient not taking: Reported on 4/15/2024), Disp: , Rfl:     Allergies   Allergen Reactions    Morphine Hives and Wheezing       Physical Exam:    /84   Pulse 62   Ht 6' (1.829 m)   Wt 63.2 kg (139 lb 6.4 oz)   BMI 18.91 kg/m²     Constitutional: visibly upset  Eyes:anicteric  HEENT:grossly intact  Neck:supple, symmetric, trachea midline and no masses   Pulmonary:even and unlabored  Cardiovascular:No edema or pitting edema present  Skin:Normal without rashes or lesions and well hydrated  Psychiatric: angry  Neurologic:Cranial Nerves II-XII grossly intact  Musculoskeletal:normal gait    Imaging  No orders to display       No orders of the defined types were placed in this encounter.

## 2024-05-24 ENCOUNTER — EVALUATION (OUTPATIENT)
Dept: PHYSICAL THERAPY | Facility: CLINIC | Age: 34
End: 2024-05-24
Payer: OTHER MISCELLANEOUS

## 2024-05-24 DIAGNOSIS — S43.431D TEAR OF RIGHT GLENOID LABRUM, SUBSEQUENT ENCOUNTER: Primary | ICD-10-CM

## 2024-05-24 PROCEDURE — 97140 MANUAL THERAPY 1/> REGIONS: CPT

## 2024-05-24 PROCEDURE — 97110 THERAPEUTIC EXERCISES: CPT

## 2024-05-24 NOTE — PROGRESS NOTES
PT Re-Evaluation     Today's date: 2024  Patient name: Shantanu Sebastian  : 1990  MRN: [id number]  Referring provider: Earl Easley*  Dx:   Encounter Diagnosis     ICD-10-CM    1. Tear of right glenoid labrum, subsequent encounter  S43.431D           Start Time: 1000  Stop Time: 1030  Total time in clinic (min): 30 minutes    Assessment  Impairments: abnormal or restricted ROM, activity intolerance, impaired physical strength, lacks appropriate home exercise program and pain with function    Assessment details: Shantanu Sebastian is a pleasant 33 y.o. male who presents today for a re-evaluation of his shoulder and neck. Shantanu complains of aching pain in the right shoulder ranging from 0/10 to 5/10. Pain is exacerbated by activity or sleep and made better by medication or rest.    The patient demonstrates minimally decreased strength during resisted muscle testing, which has improved from initial evaluation. Pt ROM is moderately decreased with pain at end ranges.    The patient has difficulty with leisure, sleeping, and athletics. Patient will benefit from continued skilled physical therapy, including therapeutic exercise, stretching, manual therapy, and modalities prn to improve their level of function, to increase overall quality of life, and to address his impairments.    Shantanu has met some of his goals at this time. Pt was instructed on his updated plan of care and wishes to continue therapy.    Understanding of Dx/Px/POC: good     Prognosis: good    Goals  STG within 4 weeks:   1. Patient to be independent in basic HEP. MET  2. Reduce pain by 50% to improve quality of life. - NOT MET  3. Improve PROM flexion to 90*. - GOAL MET       LTG within 8 weeks: ALL NOT MET-PROGRESSING   1. Patient to be independent in ADLs/IADLs without difficulty. - GOAL MET   2. The patient will demonstrate WFL shoulder ROM in all directions to allow completion of all functional activities. - GOAL PROGRESSING   3. The  "patient will demonstrate WFL shoulder MMTs in all directions to allow completion of all functional activities. - GOAL PROGRESSING   4. The patient will be able to complete overhead lifting required vocationally without pain or difficulty to allow full return to work. - GOAL NOT MET     Plan  Patient would benefit from: skilled physical therapy  Planned modality interventions: cryotherapy, hydrotherapy and unattended electrical stimulation    Planned therapy interventions: therapeutic exercise, therapeutic activities, stretching, strengthening, postural training, patient education, neuromuscular re-education, manual therapy, joint mobilization, activity modification, flexibility, functional ROM exercises, home exercise program, ADL retraining, ADL training and IASTM    Frequency: 2x week  Duration in weeks: 8  Plan of Care beginning date: 2024  Plan of Care expiration date: 2024  Treatment plan discussed with: patient      Subjective Evaluation    History of Present Illness  Mechanism of injury: Shantanu reports that he is feeling better in his shoulder since starting therapy a few months ago.    he notes some difficulty with his usual tasks including reaching overhead or lifting.     he notes 3/10 pain at this time that gets up to 10/10 at times.    he notes adherence to HEP and would like to continue with therapy.    Patient Goals  Patient goal: \"To get the movement and strength back.\"  Pain  Current pain rating: 3  At best pain ratin  At worst pain rating: 10  Quality: throbbing and sharp  Aggravating factors: lifting and overhead activity (getting dressed, changing kids diapers)    Social Support    Employment status: working (- OOW at this time)  Hand dominance: right    Treatments  Previous treatment: physical therapy      Objective     Concurrent Complaints  Positive for night pain, disturbed sleep, dizziness (pain in the neck causes this, along with headaches, \"sees colors and lights\"), " faints (fainted one month ago, had a mild heart attack one month ago), headaches (constant, blurry vision, nauseous), nausea/motion sickness, difficulty breathing (asthma), shortness of breath (asthma) and visual change (blurriness, worse since he started getting shots April 5th). Negative for tinnitus, trouble swallowing and respiratory pain    Neurological Testing     Sensation     Shoulder     Right Shoulder   Diminished: Light touch    Comments   Right light touch: unchanged    Active Range of Motion   Left Shoulder   Flexion: 160 degrees   Abduction: 130 degrees   External rotation 0°: 85 degrees   Internal rotation BTB: T7     Right Shoulder   Flexion: 160 degrees   Abduction: 130 degrees   External rotation 0°: WFL and with pain  External rotation BTH: C7   Internal rotation BTB: L5 with pain    Passive Range of Motion     Right Shoulder   Normal passive range of motion    Additional Passive Range of Motion Details  Pain at end ranges    Strength/Myotome Testing   Cervical Spine     Left   Normal strength    Left Shoulder   Normal muscle strength    Right Shoulder     Planes of Motion   Flexion: 4   Abduction: 4   External rotation at 0°: 4   Internal rotation at 0°: 4     Additional Strength Details  4/29/2024  R - 70lbs  L - 105lbs    5/24/2024  R - 80lbs  L - 120lbs  Neuro Exam:     Headaches   Patient reports headaches: Yes (constant, blurry vision, nauseous).            Precautions: asthma, history of chronic right shoulder pain, recent R labral repair   Access Code: [email or web address]  URL: [email or web address]  Date: 04/17/2024  Prepared by: Princess Carl    POC expires Unit limit Auth Expiration date PT/OT/ST + Visit Limit?   6/12/24 BOMN N/A WC insurance only, BOMN    Visit/Unit Tracking  AUTH Status:  Date 4/17 4/22 4/26 4/29 5/7 5/16         BOMN Used 12 13 14 15 16 17          Remaining                    Manuals 5/16 5/24  3/21 3/28  4/17 4/22 4/26 4/29 5/7   Light Scap ROM              R  "shoulder PROM per protocol  TS  BE  <90 degrees SC  BE TS BE     Loaded moderate cervical retractions          X10 no change   Cervical traction          JF                Re-eval  TS    BE   TS - neck    Neuro Re-Ed             Cervical 4 way isometric hold           10x10\" ea                 Prone [place] Condez[place]ida M, L Black 3x10  Black 3x10   held BTB 3x 10                      Ther Ex             Pt Education      PT POC, HEP       UBE for posture and cardio 3'/3'  4'/4'   3'/3' Hold   decl      Pulleys 3' 4'  5 min 4 min   decl 5'  5'   Finger ladder ecc. control    15x5\" 15x 5\"         Table slides flex, scapt       5\" 2x10  20x5\"  5\"x20  5\"x20   Seated ER stretch      5\"2x10 20x5\"  5\"x20  5\"x20   Supine cane flexion stretch             TB ER  3x10 GTB 3x10 BTB           TB IR  3x10 BTB 3x10 BTB           TB Adduction 3x10 BTB            TB Tricep ext GTB 3x15            TB Bicep curl BTB 3x12            Standing shoulder 3 way raises             1st Rib Self Mobilization 15x5\" R            SL shld ER          2x10   Ther Activity                                       Gait Training                                       Modalities             TENS- MWR- R shld              CP- R shld                       "

## 2024-05-24 NOTE — LETTER
May 24, 2024    Jeff Christine MD  [address] [facility] [address]    Patient: Shantanu Sebastian   YOB: 1990   Date of Visit: 2024     Encounter Diagnosis     ICD-10-CM    1. Tear of right glenoid labrum, subsequent encounter  S43.431D           Dear Dr. Christine:    Thank you for your recent referral of Shantanu Sebastian. Please review the attached evaluation summary from Shantanu's recent visit.     Please verify that you agree with the plan of care by signing the attached order.     If you have any questions or concerns, please do not hesitate to call.     I sincerely appreciate the opportunity to share in the care of one of your patients and hope to have another opportunity to work with you in the near future.       Sincerely,    Terry Wang, PT      Referring Provider:      I certify that I have read the below Plan of Care and certify the need for these services furnished under this plan of treatment while under my care.                    Jeff Christine MD  [address] [facility] [address]  Via Fax: [phone]          PT Re-Evaluation     Today's date: 2024  Patient name: Shantanu Sebastian  : 1990  MRN: [id number]  Referring provider: Earl Easley*  Dx:   Encounter Diagnosis     ICD-10-CM    1. Tear of right glenoid labrum, subsequent encounter  S43.431D           Start Time: 1000  Stop Time: 1030  Total time in clinic (min): 30 minutes    Assessment  Impairments: abnormal or restricted ROM, activity intolerance, impaired physical strength, lacks appropriate home exercise program and pain with function    Assessment details: Shantanu Sebastian is a pleasant 33 y.o. male who presents today for a re-evaluation of his shoulder and neck. Shantanu complains of aching pain in the right shoulder ranging from 0/10 to 5/10. Pain is exacerbated by activity or sleep and made better by medication or rest.    The patient demonstrates minimally decreased strength during  resisted muscle testing, which has improved from initial evaluation. Pt ROM is moderately decreased with pain at end ranges.    The patient has difficulty with leisure, sleeping, and athletics. Patient will benefit from continued skilled physical therapy, including therapeutic exercise, stretching, manual therapy, and modalities prn to improve their level of function, to increase overall quality of life, and to address his impairments.    Shantanu has met some of his goals at this time. Pt was instructed on his updated plan of care and wishes to continue therapy.    Understanding of Dx/Px/POC: good     Prognosis: good    Goals  STG within 4 weeks:   1. Patient to be independent in basic HEP. MET  2. Reduce pain by 50% to improve quality of life. - NOT MET  3. Improve PROM flexion to 90*. - GOAL MET       LTG within 8 weeks: ALL NOT MET-PROGRESSING   1. Patient to be independent in ADLs/IADLs without difficulty. - GOAL MET   2. The patient will demonstrate WFL shoulder ROM in all directions to allow completion of all functional activities. - GOAL PROGRESSING   3. The patient will demonstrate WFL shoulder MMTs in all directions to allow completion of all functional activities. - GOAL PROGRESSING   4. The patient will be able to complete overhead lifting required vocationally without pain or difficulty to allow full return to work. - GOAL NOT MET     Plan  Patient would benefit from: skilled physical therapy  Planned modality interventions: cryotherapy, hydrotherapy and unattended electrical stimulation    Planned therapy interventions: therapeutic exercise, therapeutic activities, stretching, strengthening, postural training, patient education, neuromuscular re-education, manual therapy, joint mobilization, activity modification, flexibility, functional ROM exercises, home exercise program, ADL retraining, ADL training and IASTM    Frequency: 2x week  Duration in weeks: 8  Plan of Care beginning date: 5/24/2024  Plan of  "Care expiration date: 2024  Treatment plan discussed with: patient      Subjective Evaluation    History of Present Illness  Mechanism of injury: Shantanu reports that he is feeling better in his shoulder since starting therapy a few months ago.    he notes some difficulty with his usual tasks including reaching overhead or lifting.     he notes 3/10 pain at this time that gets up to 10/10 at times.    he notes adherence to HEP and would like to continue with therapy.    Patient Goals  Patient goal: \"To get the movement and strength back.\"  Pain  Current pain rating: 3  At best pain ratin  At worst pain rating: 10  Quality: throbbing and sharp  Aggravating factors: lifting and overhead activity (getting dressed, changing kids diapers)    Social Support    Employment status: working (- OOW at this time)  Hand dominance: right    Treatments  Previous treatment: physical therapy      Objective     Concurrent Complaints  Positive for night pain, disturbed sleep, dizziness (pain in the neck causes this, along with headaches, \"sees colors and lights\"), faints (fainted one month ago, had a mild heart attack one month ago), headaches (constant, blurry vision, nauseous), nausea/motion sickness, difficulty breathing (asthma), shortness of breath (asthma) and visual change (blurriness, worse since he started getting shots ). Negative for tinnitus, trouble swallowing and respiratory pain    Neurological Testing     Sensation     Shoulder     Right Shoulder   Diminished: Light touch    Comments   Right light touch: unchanged    Active Range of Motion   Left Shoulder   Flexion: 160 degrees   Abduction: 130 degrees   External rotation 0°: 85 degrees   Internal rotation BTB: T7     Right Shoulder   Flexion: 160 degrees   Abduction: 130 degrees   External rotation 0°: WFL and with pain  External rotation BTH: C7   Internal rotation BTB: L5 with pain    Passive Range of Motion     Right Shoulder   Normal passive " "range of motion    Additional Passive Range of Motion Details  Pain at end ranges    Strength/Myotome Testing   Cervical Spine     Left   Normal strength    Left Shoulder   Normal muscle strength    Right Shoulder     Planes of Motion   Flexion: 4   Abduction: 4   External rotation at 0°: 4   Internal rotation at 0°: 4     Additional Strength Details  4/29/2024  R - 70lbs  L - 105lbs    5/24/2024  R - 80lbs  L - 120lbs  Neuro Exam:     Headaches   Patient reports headaches: Yes (constant, blurry vision, nauseous).            Precautions: asthma, history of chronic right shoulder pain, recent R labral repair   Access Code: [email or web address]  URL: [email or web address]  Date: 04/17/2024  Prepared by: Princess Carl    POC expires Unit limit Auth Expiration date PT/OT/ST + Visit Limit?   6/12/24 BOMN N/A WC insurance only, BOMN    Visit/Unit Tracking  AUTH Status:  Date 4/17 4/22 4/26 4/29 5/7 5/16         BOMN Used 12 13 14 15 16 17          Remaining                    Manuals 5/16 5/24  3/21 3/28  4/17 4/22 4/26 4/29 5/7   Light Scap ROM              R shoulder PROM per protocol  TS  BE  <90 degrees SC  BE TS BE     Loaded moderate cervical retractions          X10 no change   Cervical traction          JF                Re-eval  TS    BE   TS - neck    Neuro Re-Ed             Cervical 4 way isometric hold           10x10\" ea                 Prone ITY             [place] M, L Black 3x10  Black 3x10   held BTB 3x 10                      Ther Ex             Pt Education      PT POC, HEP       UBE for posture and cardio 3'/3'  4'/4'   3'/3' Hold   decl      Pulleys 3' 4'  5 min 4 min   decl 5'  5'   Finger ladder ecc. control    15x5\" 15x 5\"         Table slides flex, scapt       5\" 2x10  20x5\"  5\"x20  5\"x20   Seated ER stretch      5\"2x10 20x5\"  5\"x20  5\"x20   Supine cane flexion stretch             TB ER  3x10 GTB 3x10 BTB           TB IR  3x10 BTB 3x10 BTB           TB Adduction 3x10 BTB            TB Tricep " "ext GTB 3x15            TB Bicep curl BTB 3x12            Standing shoulder 3 way raises             1st Rib Self Mobilization 15x5\" R            SL shld ER          2x10   Ther Activity                                       Gait Training                                       Modalities             TENS- MWR- R shld              CP- R shld                                       "

## 2024-06-05 ENCOUNTER — OFFICE VISIT (OUTPATIENT)
Dept: PHYSICAL THERAPY | Facility: CLINIC | Age: 34
End: 2024-06-05
Payer: OTHER MISCELLANEOUS

## 2024-06-05 DIAGNOSIS — S43.431D TEAR OF RIGHT GLENOID LABRUM, SUBSEQUENT ENCOUNTER: Primary | ICD-10-CM

## 2024-06-05 PROCEDURE — 97140 MANUAL THERAPY 1/> REGIONS: CPT

## 2024-06-05 PROCEDURE — 97110 THERAPEUTIC EXERCISES: CPT

## 2024-06-05 PROCEDURE — 97112 NEUROMUSCULAR REEDUCATION: CPT

## 2024-06-05 NOTE — PROGRESS NOTES
"Daily Note     Today's date: 2024  Patient name: Shantanu Sebastian  : 1990  MRN: [id number]  Referring provider: Earl Easley*  Dx:   Encounter Diagnosis     ICD-10-CM    1. Tear of right glenoid labrum, subsequent encounter  S43.431D           Start Time: 1104  Stop Time: 1136  Total time in clinic (min): 32 minutes    Subjective: Pt noted that his R shoulder has been doing better.       Objective: See treatment diary below      Assessment:  Focus on R shoulder. No progressions added at this time. Progressions within protocol at this time.  Tolerated treatment well. Patient exhibited good technique with therapeutic exercises and would benefit from continued PT.  No changes at the end of treatment session.     Advised may have some increase in DOMS secondary to progressions.       Plan: Continue per plan of care.      Precautions: asthma, history of chronic right shoulder pain, recent R labral repair   Access Code: [email or web address]  URL: [email or web address]  Date: 2024  Prepared by: Princess Carl    POC expires Unit limit Auth Expiration date PT/OT/ST + Visit Limit?   24 BOMN N/A WC insurance only, BOMN    Visit/Unit Tracking  AUTH Status:  Date         BOMN Used 12 13 14 15 16 17 18         Remaining                    Manuals 5/16 5/24 6/5  3/28  4/17 4/22 4/26 4/29 5/7   Light Scap ROM              R shoulder PROM per protocol  TS SC  SC  BE TS BE     Loaded moderate cervical retractions          X10 no change   Cervical traction          JF                Re-eval  TS    BE   TS - neck    Neuro Re-Ed             Cervical 4 way isometric hold           10x10\" ea                 Prone ITY             [place] M, L Black 3x10  Black 3x10  Blue 3x 10  for L   Black 3x 10 M   BTB 3x 10                      Ther Ex             Pt Education      PT POC, HEP       UBE for posture and cardio 3'/3'  4'/4'  3'/3'  Hold   decl      Pulleys 3' 4' 5 min   4 " "min   decl 5'  5'   Finger ladder ecc. control     15x 5\"         Table slides flex, scapt       5\" 2x10  20x5\"  5\"x20  5\"x20   Seated ER stretch      5\"2x10 20x5\"  5\"x20  5\"x20   Supine cane flexion stretch             TB ER  3x10 GTB 3x10 BTB 3x 10 BTB           TB IR  3x10 BTB 3x10 BTB 3x 10 BTB           TB Adduction 3x10 BTB            TB Tricep ext GTB 3x15            TB Bicep curl BTB 3x12            Standing shoulder 3 way raises             1st Rib Self Mobilization 15x5\" R            SL shld ER          2x10   Ther Activity                                       Gait Training                                       Modalities             TENS- MWR- R shld              CP- R shld                       "

## 2024-06-18 ENCOUNTER — APPOINTMENT (OUTPATIENT)
Dept: URGENT CARE | Age: 34
End: 2024-06-18
Payer: OTHER MISCELLANEOUS

## 2024-06-18 PROCEDURE — 99213 OFFICE O/P EST LOW 20 MIN: CPT | Performed by: PREVENTIVE MEDICINE

## 2024-06-27 ENCOUNTER — EVALUATION (OUTPATIENT)
Dept: PHYSICAL THERAPY | Facility: CLINIC | Age: 34
End: 2024-06-27

## 2024-06-27 DIAGNOSIS — S43.431D TEAR OF RIGHT GLENOID LABRUM, SUBSEQUENT ENCOUNTER: Primary | ICD-10-CM

## 2024-06-27 DIAGNOSIS — M54.41 CHRONIC RIGHT-SIDED LOW BACK PAIN WITH RIGHT-SIDED SCIATICA: ICD-10-CM

## 2024-06-27 DIAGNOSIS — G89.29 CHRONIC RIGHT-SIDED LOW BACK PAIN WITH RIGHT-SIDED SCIATICA: ICD-10-CM

## 2024-06-27 DIAGNOSIS — M54.2 CERVICALGIA: ICD-10-CM

## 2024-06-27 PROCEDURE — 97140 MANUAL THERAPY 1/> REGIONS: CPT

## 2024-06-27 PROCEDURE — 97110 THERAPEUTIC EXERCISES: CPT

## 2024-06-27 NOTE — PROGRESS NOTES
PT Re-Evaluation     Today's date: 2024  Patient name: Shantanu Sebastian  : 1990  MRN: [id number]  Referring provider: Earl Easley*  Dx:   Encounter Diagnosis     ICD-10-CM    1. Tear of right glenoid labrum, subsequent encounter  S43.431D       2. Cervicalgia  M54.2       3. Chronic right-sided low back pain with right-sided sciatica  M54.41     G89.29           Start Time: 1800  Stop Time: 1845  Total time in clinic (min): 45 minutes    Assessment  Impairments: abnormal or restricted ROM, activity intolerance, impaired physical strength, lacks appropriate home exercise program and pain with function    Assessment details: Shantanu Sebastian is a pleasant 33 y.o. male who presents today for a re-evaluation of his shoulder, neck, and low back pain. Shantanu complains of aching pain in the right shoulder ranging from 0/10 to 5/10. Pain in the low back ranging from 0 to 10/10. Pain is exacerbated by activity or sleep and made better by medication or rest.    The patient demonstrates minimally decreased strength during resisted muscle testing, which has improved from initial evaluation. Pt ROM is moderately decreased with pain at end ranges.    The patient has difficulty with leisure, sleeping, and athletics. Patient will benefit from continued skilled physical therapy, including therapeutic exercise, stretching, manual therapy, and modalities prn to improve their level of function, to increase overall quality of life, and to address his impairments.    Shantanu has met some of his goals at this time. Pt was instructed on his updated plan of care and wishes to continue therapy.    Understanding of Dx/Px/POC: good     Prognosis: good    Goals  STG within 4 weeks:   1. Patient to be independent in basic HEP. MET  2. Reduce pain by 50% to improve quality of life. - NOT MET  3. Improve PROM flexion to 90*. - GOAL MET       LTG within 8 weeks: ALL NOT MET-PROGRESSING   1. Patient to be independent in ADLs/IADLs  without difficulty. - GOAL MET   2. The patient will demonstrate WFL shoulder ROM in all directions to allow completion of all functional activities. - GOAL PROGRESSING   3. The patient will demonstrate WFL shoulder MMTs in all directions to allow completion of all functional activities. - GOAL PROGRESSING   4. The patient will be able to complete overhead lifting required vocationally without pain or difficulty to allow full return to work. - GOAL NOT MET     Plan  Patient would benefit from: skilled physical therapy  Planned modality interventions: cryotherapy, hydrotherapy and unattended electrical stimulation    Planned therapy interventions: therapeutic exercise, therapeutic activities, stretching, strengthening, postural training, patient education, neuromuscular re-education, manual therapy, joint mobilization, activity modification, flexibility, functional ROM exercises, home exercise program, ADL retraining, ADL training and IASTM    Frequency: 2x week  Duration in weeks: 8  Plan of Care beginning date: 6/27/2024  Plan of Care expiration date: 7/29/2024  Treatment plan discussed with: patient      Subjective Evaluation    History of Present Illness  Mechanism of injury: Shantanu reports that he is feeling better in his shoulder since starting therapy a few months ago for his shoulder.    he notes little difficulty with his usual tasks including reaching overhead or lifting.     He notes adherence to HEP for his shoulder and relative improvement overall. He would like to do 4 more weeks of therapy then discharge to independent HEP for his shoulder.     Separately, he reports chronic LBP that began over a year ago following his other work related injuries. He notes constant sharp, burning pain down his R leg along with numbness and tingling. He reports that heat helps, and is taking medication multiple medications. He notes that sitting for a long time is what really bothers his back.   Patient Goals  Patient  "goal: \"To get the movement and strength back.\"  Pain  Current pain rating: 3  At best pain ratin  At worst pain rating: 10  Quality: throbbing, sharp and burning  Aggravating factors: lifting, overhead activity and sitting (getting dressed, changing kids diapers)    Social Support    Employment status: working (- OOW at this time)  Hand dominance: right    Treatments  Previous treatment: physical therapy      Objective     Concurrent Complaints  Positive for night pain, disturbed sleep, dizziness (pain in the neck causes this, along with headaches, \"sees colors and lights\"), faints (fainted one month ago, had a mild heart attack one month ago), headaches (constant, blurry vision, nauseous), nausea/motion sickness, difficulty breathing (asthma), shortness of breath (asthma) and visual change (blurriness, worse since he started getting shots ). Negative for tinnitus, trouble swallowing and respiratory pain    Neurological Testing     Sensation     Shoulder     Right Shoulder   Diminished: Light touch    Comments   Right light touch: unchanged    Knee   Left Knee   Intact: Light touch    Right Knee   Paresthesia: light touch     Comments   Right light touch: Numbness down to R foot..     Reflexes   Left   Patellar (L4): normal (2+)    Right   Patellar (L4): normal (2+)    Active Range of Motion   Left Shoulder   Flexion: 160 degrees   Abduction: 130 degrees   External rotation 0°: 85 degrees   Internal rotation BTB: T7     Right Shoulder   Flexion: 160 degrees   Abduction: 130 degrees   External rotation 0°: WFL and with pain  External rotation BTH: C7   Internal rotation BTB: L5 with pain    Lumbar   Flexion:  with pain Restriction level: moderate  Extension:  with pain Restriction level: moderate  Left lateral flexion:  WFL  Right lateral flexion:  WFL  Left rotation:  WFL  Right rotation:  WFL    Passive Range of Motion     Right Shoulder   Normal passive range of motion  Left Knee   Normal " "passive range of motion    Right Knee   Normal passive range of motion    Additional Passive Range of Motion Details  Pain at end ranges  Mechanical Assessment    Cervical      Thoracic      Lumbar    Standing flexion: repeated movements   Pain location:peripheralized  Pain intensity: worse  Pain level: increased  Lying extension: sustained positions  Pain location: centralized  Pain level: increased  increased \"burning\"    Strength/Myotome Testing   Cervical Spine     Left   Normal strength    Left Shoulder   Normal muscle strength    Right Shoulder     Planes of Motion   Flexion: 4   Abduction: 4   External rotation at 0°: 4   Internal rotation at 0°: 4     Left Knee   Normal strength    Right Knee   Flexion: 5  Extension: 4+    Left Ankle/Foot   Dorsiflexion: 5  Great toe extension: 5    Right Ankle/Foot   Dorsiflexion: 4  Great toe extension: 4    Additional Strength Details  4/29/2024  R - 70lbs  L - 105lbs    5/24/2024  R - 80lbs  L - 120lbs    Tests     Lumbar     Left   Negative crossed SLR, passive SLR and slump test.     Right   Positive femoral stretch and slump test.   Negative crossed SLR and passive SLR.     Left Pelvic Girdle/Sacrum   Negative: active SLR test.     Right Pelvic Girdle/Sacrum   Negative: active SLR test.     Left Hip   Negative BERNICE and FADIR.     Right Hip   Negative BERNICE and FADIR.     Left Knee   Negative Apley's compression.     Right Knee   Positive peroneal nerve tension.   Negative Apley's compression, lateral Nupur and medial Nupur.     Additional Tests Details  Active SLR ROM to 30 bilaterally.    General Comments:    Lower quarter screen   Hips: unremarkable  Knees: unremarkable  Foot/ankle: unremarkable    Knee Comments  No swelling, denies TTP on joint lines. Hx of \"giving out\". Denies locking  Neuro Exam:     Headaches   Patient reports headaches: Yes (constant, blurry vision, nauseous).            Precautions: asthma, history of chronic right shoulder pain, recent " "R labral repair   Access Code: [email or web address]  URL: [email or web address]  Date: 04/17/2024  Prepared by: Princess Carl    POC expires Unit limit Auth Expiration date PT/OT/ST + Visit Limit?   6/12/24 BOMN N/A WC insurance only, BOMN    Visit/Unit Tracking  AUTH Status:  Date 4/17 4/22 4/26 4/29 5/7 5/16         BOMN Used 12 13 14 15 16 17          Remaining                    Manuals 5/16 5/24  3/21 3/28  4/17 4/22 4/26 4/29 5/7   Light Scap ROM              R shoulder PROM per protocol  TS  BE  <90 degrees SC  BE TS BE     Loaded moderate cervical retractions          X10 no change   Cervical traction          JF                Re-eval  TS    BE   TS - neck    Neuro Re-Ed             Cervical 4 way isometric hold           10x10\" ea                 Prone ITY             Webslide M, L Black 3x10  Black 3x10   held BTB 3x 10                      Ther Ex             Pt Education      PT POC, HEP       UBE for posture and cardio 3'/3'  4'/4'   3'/3' Hold   decl      Pulleys 3' 4'  5 min 4 min   decl 5'  5'   Finger ladder ecc. control    15x5\" 15x 5\"         Table slides flex, scapt       5\" 2x10  20x5\"  5\"x20  5\"x20   Seated ER stretch      5\"2x10 20x5\"  5\"x20  5\"x20   Supine cane flexion stretch             TB ER  3x10 GTB 3x10 BTB           TB IR  3x10 BTB 3x10 BTB           TB Adduction 3x10 BTB            TB Tricep ext GTB 3x15            TB Bicep curl BTB 3x12            Standing shoulder 3 way raises             1st Rib Self Mobilization 15x5\" R            SL shld ER          2x10   Ther Activity                                       Gait Training                                       Modalities             TENS- MWR- R shld              CP- R shld                       "

## 2024-06-27 NOTE — LETTER
2024    Efrem Parra MD  [address] [facility] [address]    Patient: Shantanu Sebastian   YOB: 1990   Date of Visit: 2024     Encounter Diagnosis     ICD-10-CM    1. Tear of right glenoid labrum, subsequent encounter  S43.431D       2. Cervicalgia  M54.2       3. Chronic right-sided low back pain with right-sided sciatica  M54.41     G89.29           Dear Dr. Parra:    Thank you for your recent referral of Shantanu Sebastian. Please review the attached evaluation summary from Shantanu's recent visit.     Please verify that you agree with the plan of care by signing the attached order.     If you have any questions or concerns, please do not hesitate to call.     I sincerely appreciate the opportunity to share in the care of one of your patients and hope to have another opportunity to work with you in the near future.       Sincerely,    Terry Wang, PT      Referring Provider:      I certify that I have read the below Plan of Care and certify the need for these services furnished under this plan of treatment while under my care.                    Efrem Parra MD  [address] [facility] [address]  Via Fax: [phone]          PT Re-Evaluation     Today's date: 2024  Patient name: Shantanu Sebastian  : 1990  MRN: [id number]  Referring provider: Earl Easley*  Dx:   Encounter Diagnosis     ICD-10-CM    1. Tear of right glenoid labrum, subsequent encounter  S43.431D       2. Cervicalgia  M54.2       3. Chronic right-sided low back pain with right-sided sciatica  M54.41     G89.29           Start Time: 1800  Stop Time: 1845  Total time in clinic (min): 45 minutes    Assessment  Impairments: abnormal or restricted ROM, activity intolerance, impaired physical strength, lacks appropriate home exercise program and pain with function    Assessment details: Shantanu Sebastian is a pleasant 33 y.o. male who presents today for a re-evaluation  of his shoulder, neck, and low back pain. Shantanu complains of aching pain in the right shoulder ranging from 0/10 to 5/10. Pain in the low back ranging from 0 to 10/10. Pain is exacerbated by activity or sleep and made better by medication or rest.    The patient demonstrates minimally decreased strength during resisted muscle testing, which has improved from initial evaluation. Pt ROM is moderately decreased with pain at end ranges.    The patient has difficulty with leisure, sleeping, and athletics. Patient will benefit from continued skilled physical therapy, including therapeutic exercise, stretching, manual therapy, and modalities prn to improve their level of function, to increase overall quality of life, and to address his impairments.    Shantanu has met some of his goals at this time. Pt was instructed on his updated plan of care and wishes to continue therapy.    Understanding of Dx/Px/POC: good     Prognosis: good    Goals  STG within 4 weeks:   1. Patient to be independent in basic HEP. MET  2. Reduce pain by 50% to improve quality of life. - NOT MET  3. Improve PROM flexion to 90*. - GOAL MET       LTG within 8 weeks: ALL NOT MET-PROGRESSING   1. Patient to be independent in ADLs/IADLs without difficulty. - GOAL MET   2. The patient will demonstrate WFL shoulder ROM in all directions to allow completion of all functional activities. - GOAL PROGRESSING   3. The patient will demonstrate WFL shoulder MMTs in all directions to allow completion of all functional activities. - GOAL PROGRESSING   4. The patient will be able to complete overhead lifting required vocationally without pain or difficulty to allow full return to work. - GOAL NOT MET     Plan  Patient would benefit from: skilled physical therapy  Planned modality interventions: cryotherapy, hydrotherapy and unattended electrical stimulation    Planned therapy interventions: therapeutic exercise, therapeutic activities, stretching, strengthening,  "postural training, patient education, neuromuscular re-education, manual therapy, joint mobilization, activity modification, flexibility, functional ROM exercises, home exercise program, ADL retraining, ADL training and IASTM    Frequency: 2x week  Duration in weeks: 8  Plan of Care beginning date: 2024  Plan of Care expiration date: 2024  Treatment plan discussed with: patient      Subjective Evaluation    History of Present Illness  Mechanism of injury: Shantanu reports that he is feeling better in his shoulder since starting therapy a few months ago for his shoulder.    he notes little difficulty with his usual tasks including reaching overhead or lifting.     He notes adherence to HEP for his shoulder and relative improvement overall. He would like to do 4 more weeks of therapy then discharge to independent HEP for his shoulder.     Separately, he reports chronic LBP that began over a year ago following his other work related injuries. He notes constant sharp, burning pain down his R leg along with numbness and tingling. He reports that heat helps, and is taking medication multiple medications. He notes that sitting for a long time is what really bothers his back.   Patient Goals  Patient goal: \"To get the movement and strength back.\"  Pain  Current pain rating: 3  At best pain ratin  At worst pain rating: 10  Quality: throbbing, sharp and burning  Aggravating factors: lifting, overhead activity and sitting (getting dressed, changing kids diapers)    Social Support    Employment status: working (- OOW at this time)  Hand dominance: right    Treatments  Previous treatment: physical therapy      Objective     Concurrent Complaints  Positive for night pain, disturbed sleep, dizziness (pain in the neck causes this, along with headaches, \"sees colors and lights\"), faints (fainted one month ago, had a mild heart attack one month ago), headaches (constant, blurry vision, nauseous), nausea/motion " "sickness, difficulty breathing (asthma), shortness of breath (asthma) and visual change (blurriness, worse since he started getting shots April 5th). Negative for tinnitus, trouble swallowing and respiratory pain    Neurological Testing     Sensation     Shoulder     Right Shoulder   Diminished: Light touch    Comments   Right light touch: unchanged    Knee   Left Knee   Intact: Light touch    Right Knee   Paresthesia: light touch     Comments   Right light touch: Numbness down to R foot..     Reflexes   Left   Patellar (L4): normal (2+)    Right   Patellar (L4): normal (2+)    Active Range of Motion   Left Shoulder   Flexion: 160 degrees   Abduction: 130 degrees   External rotation 0°: 85 degrees   Internal rotation BTB: T7     Right Shoulder   Flexion: 160 degrees   Abduction: 130 degrees   External rotation 0°: WFL and with pain  External rotation BTH: C7   Internal rotation BTB: L5 with pain    Lumbar   Flexion:  with pain Restriction level: moderate  Extension:  with pain Restriction level: moderate  Left lateral flexion:  WFL  Right lateral flexion:  WFL  Left rotation:  WFL  Right rotation:  WFL    Passive Range of Motion     Right Shoulder   Normal passive range of motion  Left Knee   Normal passive range of motion    Right Knee   Normal passive range of motion    Additional Passive Range of Motion Details  Pain at end ranges  Mechanical Assessment    Cervical      Thoracic      Lumbar    Standing flexion: repeated movements   Pain location:peripheralized  Pain intensity: worse  Pain level: increased  Lying extension: sustained positions  Pain location: centralized  Pain level: increased  increased \"burning\"    Strength/Myotome Testing   Cervical Spine     Left   Normal strength    Left Shoulder   Normal muscle strength    Right Shoulder     Planes of Motion   Flexion: 4   Abduction: 4   External rotation at 0°: 4   Internal rotation at 0°: 4     Left Knee   Normal strength    Right Knee   Flexion: " "5  Extension: 4+    Left Ankle/Foot   Dorsiflexion: 5  Great toe extension: 5    Right Ankle/Foot   Dorsiflexion: 4  Great toe extension: 4    Additional Strength Details  4/29/2024  R - 70lbs  L - 105lbs    5/24/2024  R - 80lbs  L - 120lbs    Tests     Lumbar     Left   Negative crossed SLR, passive SLR and slump test.     Right   Positive femoral stretch and slump test.   Negative crossed SLR and passive SLR.     Left Pelvic Girdle/Sacrum   Negative: active SLR test.     Right Pelvic Girdle/Sacrum   Negative: active SLR test.     Left Hip   Negative BERNICE and FADIR.     Right Hip   Negative BERNICE and FADIR.     Left Knee   Negative Apley's compression.     Right Knee   Positive peroneal nerve tension.   Negative Apley's compression, lateral Nupur and medial Nupur.     Additional Tests Details  Active SLR ROM to 30 bilaterally.    General Comments:    Lower quarter screen   Hips: unremarkable  Knees: unremarkable  Foot/ankle: unremarkable    Knee Comments  No swelling, denies TTP on joint lines. Hx of \"giving out\". Denies locking  Neuro Exam:     Headaches   Patient reports headaches: Yes (constant, blurry vision, nauseous).            Precautions: asthma, history of chronic right shoulder pain, recent R labral repair   Access Code: [email or web address]  URL: [email or web address]  Date: 04/17/2024  Prepared by: Princess Carl    POC expires Unit limit Auth Expiration date PT/OT/ST + Visit Limit?   6/12/24 BOMN N/A WC insurance only, BOMN    Visit/Unit Tracking  AUTH Status:  Date 4/17 4/22 4/26 4/29 5/7 5/16         BOMN Used 12 13 14 15 16 17          Remaining                    Manuals 5/16 5/24  3/21 3/28  4/17 4/22 4/26 4/29 5/7   Light Scap ROM              R shoulder PROM per protocol  TS  BE  <90 degrees SC  BE TS BE     Loaded moderate cervical retractions          X10 no change   Cervical traction          JF                Re-eval  TS    BE   TS - neck    Neuro Re-Ed             Cervical 4 way " "isometric hold           10x10\" ea                 Prone ITADELINA Whitaker M, L Black 3x10  Black 3x10   held BTB 3x 10                      Ther Ex             Pt Education      PT POC, HEP       UBE for posture and cardio 3'/3'  4'/4'   3'/3' Hold   decl      Pulleys 3' 4'  5 min 4 min   decl 5'  5'   Finger ladder ecc. control    15x5\" 15x 5\"         Table slides flex, scapt       5\" 2x10  20x5\"  5\"x20  5\"x20   Seated ER stretch      5\"2x10 20x5\"  5\"x20  5\"x20   Supine cane flexion stretch             TB ER  3x10 GTB 3x10 BTB           TB IR  3x10 BTB 3x10 BTB           TB Adduction 3x10 BTB            TB Tricep ext GTB 3x15            TB Bicep curl BTB 3x12            Standing shoulder 3 way raises             1st Rib Self Mobilization 15x5\" R            SL shld ER          2x10   Ther Activity                                       Gait Training                                       Modalities             TENS- MWR- R shld              CP- R shld                                       "

## 2024-07-01 ENCOUNTER — OFFICE VISIT (OUTPATIENT)
Dept: PHYSICAL THERAPY | Facility: CLINIC | Age: 34
End: 2024-07-01
Payer: OTHER MISCELLANEOUS

## 2024-07-01 DIAGNOSIS — S43.431D TEAR OF RIGHT GLENOID LABRUM, SUBSEQUENT ENCOUNTER: Primary | ICD-10-CM

## 2024-07-01 PROCEDURE — 97112 NEUROMUSCULAR REEDUCATION: CPT

## 2024-07-01 PROCEDURE — 97110 THERAPEUTIC EXERCISES: CPT

## 2024-07-01 NOTE — PROGRESS NOTES
"Daily Note    Today's date: 24  Patient name: Shantanu Sebastian  : 1990  MRN: [id number]  Referring provider: Earl Easley*  Dx:   Encounter Diagnosis     ICD-10-CM    1. Tear of right glenoid labrum, subsequent encounter  S43.431D           Start Time: 1130  Stop Time: 1216  Total time in clinic (min): 46 minutes      Subjective: Shantanu reports his shoulder has been feeling pretty good compared to before.    Objective: See treatment diary below.    Assessment: Shantanu tolerated treatment well with consistent cuing throughout. TE's were performed with increased reps and increased resistance. New TE's were demonstrated with proper technique, and tolerated well. Following treatment, the patient demonstrated fatigue and would benefit from continued physical therapy.    Plan: Continue per plan of care.  Progress treatment as tolerated.       Precautions: asthma, history of chronic right shoulder pain, recent R labral repair   Access Code: [email or web address]  URL: [email or web address]  Date: 2024  Prepared by: Princess Carl    POC expires Unit limit Auth Expiration date PT/OT/ST + Visit Limit?   24 BOMN N/A WC insurance only, BOMN    Visit/Unit Tracking  AUTH Status:  Date         BOMN Used 12 13 14 15 16 17 18         Remaining                    Manuals  6 7/ 5   Light Scap ROM              R shoulder PROM per protocol  TS SC   BE TS BE     Loaded moderate cervical retractions          X10 no change   Cervical traction          JF                Re-eval  TS    BE   TS - neck    Neuro Re-Ed             Cervical 4 way isometric hold           10x10\" ea                 Prone ITY    3x10 2#         Webslide M, L Black 3x10  Black 3x10  Blue 3x 10  for L   Black 3x 10 M  [place] 18# 3x10 R only                      Ther Ex             Pt Education      PT POC, HEP       UBE for posture and cardio 3'/3'  4'/4'  3'/3' 4'/4'   " " decl      Pulleys 3' 4' 5 min  5'   decl 5'  5'   Finger ladder ecc. control             Table slides flex, scapt       5\" 2x10  20x5\"  5\"x20  5\"x20   Seated ER stretch      5\"2x10 20x5\"  5\"x20  5\"x20   Supine cane flexion stretch             TB ER  3x10 GTB 3x10 BTB 3x 10 BTB  [place] 3# 2x10          TB IR  3x10 BTB 3x10 BTB 3x 10 BTB  [place] 8# 3x10          TB Adduction 3x10 BTB   [place] 7# 3x10          TB Tricep ext GTB 3x15            TB Bicep curl BTB 3x12            Standing shoulder 3 way raises             1st Rib Self Mobilization 15x5\" R            SL shld ER          2x10   Ther Activity                                       Gait Training                                       Modalities             TENS- MWR- R giorgi              CP- R giorgi Wang, PT  7/1/2024,12:38 PM  "

## 2024-07-09 ENCOUNTER — OFFICE VISIT (OUTPATIENT)
Dept: PHYSICAL THERAPY | Facility: CLINIC | Age: 34
End: 2024-07-09

## 2024-07-09 DIAGNOSIS — M54.2 CERVICALGIA: ICD-10-CM

## 2024-07-09 DIAGNOSIS — G89.29 CHRONIC RIGHT-SIDED LOW BACK PAIN WITH RIGHT-SIDED SCIATICA: ICD-10-CM

## 2024-07-09 DIAGNOSIS — M54.41 CHRONIC RIGHT-SIDED LOW BACK PAIN WITH RIGHT-SIDED SCIATICA: ICD-10-CM

## 2024-07-09 DIAGNOSIS — S43.431D TEAR OF RIGHT GLENOID LABRUM, SUBSEQUENT ENCOUNTER: Primary | ICD-10-CM

## 2024-07-09 PROCEDURE — 97140 MANUAL THERAPY 1/> REGIONS: CPT

## 2024-07-09 PROCEDURE — 97112 NEUROMUSCULAR REEDUCATION: CPT

## 2024-07-09 PROCEDURE — 97110 THERAPEUTIC EXERCISES: CPT

## 2024-07-09 NOTE — PROGRESS NOTES
"Daily Note    Today's date: 24  Patient name: Shantanu Sebastian  : 1990  MRN: [id number]  Referring provider: Earl Easley*  Dx:   Encounter Diagnosis     ICD-10-CM    1. Tear of right glenoid labrum, subsequent encounter  S43.431D       2. Cervicalgia  M54.2       3. Chronic right-sided low back pain with right-sided sciatica  M54.41     G89.29           Start Time: 730  Stop Time: 815  Total time in clinic (min): 45 minutes      Subjective: Shantanu reports that his back has been feeling the same. His symptoms have worsened in the last few days since he was driving more in the car.    Objective: See treatment diary below.    Assessment: Shantanu tolerated treatment well with consistent cuing throughout. TE's were performed with increased reps and increased resistance. New TE's were demonstrated with proper technique, and tolerated well. Following treatment, the patient demonstrated fatigue and would benefit from continued physical therapy.    Plan: Continue per plan of care.  Progress treatment as tolerated.       Precautions: asthma, history of chronic right shoulder pain, recent R labral repair   Access Code: [email or web address]  URL: [email or web address]  Date: 2024  Prepared by: Princess Carl    POC expires Unit limit Auth Expiration date PT/OT/ST + Visit Limit?   24 BOMN N/A WC insurance only, BOMN    Visit/Unit Tracking  AUTH Status:  Date       BOMN Used 12 13 14 15 16 17 18 19 20       Remaining                    Manuals    Light Scap ROM              R shoulder PROM per protocol  TS SC    TS BE     Loaded moderate cervical retractions          X10 no change   Cervical traction          JF                Lumbar Traction     TS        Lumbar PA Mobs     TS        Re-eval  TS       TS - neck    Neuro Re-Ed             Cervical 4 way isometric hold           10x10\" ea                 Prone ITY   " " 3x10 2#         Webslide M, L Black 3x10  Black 3x10  Blue 3x 10  for L   Black 3x 10 M  Grace 18# 3x10 R only         TA PB Press     20x5\"         TA Deadbug Progression     15x3\" ea        TA Side plank     10x10\"         TA Bird dog     10x10\"                      Ther Ex             Pt Education             Nustep for cardio/ROM     10' L4 w/ UE        UBE for posture and cardio 3'/3'  4'/4'  3'/3' 4'/4'    decl      Pulleys 3' 4' 5 min  5'   decl 5'  5'   Finger ladder ecc. control             Table slides flex, scapt        20x5\"  5\"x20  5\"x20   Seated ER stretch       20x5\"  5\"x20  5\"x20   Supine cane flexion stretch             TB ER  3x10 GTB 3x10 BTB 3x 10 BTB  [place] 3# 2x10          TB IR  3x10 BTB 3x10 BTB 3x 10 BTB  [place] 8# 3x10          TB Adduction 3x10 BTB   [place] 7# 3x10          TB Tricep ext GTB 3x15            TB Bicep curl BTB 3x12            Standing shoulder 3 way raises             1st Rib Self Mobilization 15x5\" R            SL shld ER          2x10   Ther Activity                                       Gait Training                                       Modalities             TENS- MWR- R shld              CP- R shld                   Terry Wang, PT  7/9/2024,2:31 PM  "

## 2024-07-12 ENCOUNTER — OFFICE VISIT (OUTPATIENT)
Dept: PHYSICAL THERAPY | Facility: CLINIC | Age: 34
End: 2024-07-12
Payer: OTHER MISCELLANEOUS

## 2024-07-12 DIAGNOSIS — S43.431D TEAR OF RIGHT GLENOID LABRUM, SUBSEQUENT ENCOUNTER: Primary | ICD-10-CM

## 2024-07-12 DIAGNOSIS — G89.29 CHRONIC RIGHT-SIDED LOW BACK PAIN WITH RIGHT-SIDED SCIATICA: ICD-10-CM

## 2024-07-12 DIAGNOSIS — M54.2 CERVICALGIA: ICD-10-CM

## 2024-07-12 DIAGNOSIS — M54.41 CHRONIC RIGHT-SIDED LOW BACK PAIN WITH RIGHT-SIDED SCIATICA: ICD-10-CM

## 2024-07-12 PROCEDURE — 97112 NEUROMUSCULAR REEDUCATION: CPT

## 2024-07-12 PROCEDURE — 97110 THERAPEUTIC EXERCISES: CPT

## 2024-07-12 NOTE — PROGRESS NOTES
"Daily Note    Today's date: 24  Patient name: Shantanu Sebastian  : 1990  MRN: [id number]  Referring provider: Earl Easley*  Dx:   Encounter Diagnosis     ICD-10-CM    1. Tear of right glenoid labrum, subsequent encounter  S43.431D       2. Cervicalgia  M54.2       3. Chronic right-sided low back pain with right-sided sciatica  M54.41     G89.29           Start Time: 745  Stop Time: 830  Total time in clinic (min): 45 minutes      Subjective: Shantanu reports feeling some burning in the back today. He just drove back from [place] and arrived home a few hours ago.     Objective: See treatment diary below.    Assessment: Shantanu tolerated treatment well with consistent cuing throughout. TE's were performed with increased reps and increased resistance. No new TE's were performed today. Following treatment, the patient demonstrated fatigue and would benefit from continued physical therapy.    Plan: Continue per plan of care.  Progress treatment as tolerated.         Precautions: asthma, history of chronic right shoulder pain, recent R labral repair   Access Code: [email or web address]  URL: [email or web address]  Date: 2024  Prepared by: Princess Carl    POC expires Unit limit Auth Expiration date PT/OT/ST + Visit Limit?   24 BOMN N/A WC insurance only, BOMN    Visit/Unit Tracking  AUTH Status:  Date      BOMN Used 12 13 14 15 16 17 18 19 20 21      Remaining                    Manuals    Light Scap ROM              R shoulder PROM per protocol  TS SC     BE     Loaded moderate cervical retractions          X10 no change   Cervical traction          JF                Lumbar Traction     TS        Lumbar PA Mobs     TS        Re-eval  TS       TS - neck    Neuro Re-Ed             Cervical 4 way isometric hold         10x10\" ea                 Prone ITY    3x10 2#         Webslide M, L Black 3x10  Black " "3x10  Blue 3x 10  for L   Black 3x 10 M  [place] 18# 3x10 R only  [place]  3x10 18# R only       TA PB Press     20x5\"         TA Deadbug Progression     15x3\" ea        TA Side plank     10x10\"         TA Bird dog     10x10\"                      Ther Ex             Pt Education             Nustep for cardio/ROM     10' L4 w/ UE        UBE for posture and cardio 3'/3'  4'/4'  3'/3' 4'/4'   5'/5'        Pulleys 3' 4' 5 min  5'  5'  5'  5'   Finger ladder ecc. control             Table slides flex, scapt         5\"x20  5\"x20   Seated ER stretch        5\"x20  5\"x20   Supine cane flexion stretch             TB ER  3x10 GTB 3x10 BTB 3x 10 BTB  [place] 3# 2x10   Grace 3# 3x10        TB IR  3x10 BTB 3x10 BTB 3x 10 BTB  [place] 8# 3x10   [place] 7# 3x10        TB Adduction 3x10 BTB   [place] 7# 3x10   [place] 8# 3x10        TB Tricep ext GTB 3x15     7# 3x10 [place]        TB Bicep curl BTB 3x12     73 3x10 Grace        Standing shoulder 3 way raises             1st Rib Self Mobilization 15x5\" R            SL shld ER          2x10   Ther Activity                                       Gait Training                                       Modalities             TENS- MWR- R giorgi              CP- R giorgi Wang, PT  7/12/2024,8:09 AM  "

## 2024-07-23 ENCOUNTER — APPOINTMENT (OUTPATIENT)
Dept: URGENT CARE | Age: 34
End: 2024-07-23
Payer: OTHER MISCELLANEOUS

## 2024-07-23 PROCEDURE — 99213 OFFICE O/P EST LOW 20 MIN: CPT | Performed by: PREVENTIVE MEDICINE

## (undated) DEVICE — KIT DISP FIBERTAK CURVED SPEAR

## (undated) DEVICE — BLADE SHAVER DISSECTOR 3.5MM 13CM COOLCUT

## (undated) DEVICE — DRESSING MEPILEX AG BORDER 4 X 4 IN

## (undated) DEVICE — GLOVE SRG BIOGEL 7.5

## (undated) DEVICE — INTENDED FOR TISSUE SEPARATION, AND OTHER PROCEDURES THAT REQUIRE A SHARP SURGICAL BLADE TO PUNCTURE OR CUT.: Brand: BARD-PARKER ® CARBON RIB-BACK BLADES

## (undated) DEVICE — IDEAL SUTURE SHUTTLE, 45 DEGREES RIGHT: Brand: IDEAL

## (undated) DEVICE — SKN PRP WNG SPNGE PVP SCRB STR: Brand: MEDLINE INDUSTRIES, INC.

## (undated) DEVICE — GLOVE SRG BIOGEL ECLIPSE 7

## (undated) DEVICE — 3M™ STERI-STRIP™ REINFORCED ADHESIVE SKIN CLOSURES, R1547, 1/2 IN X 4 IN (12 MM X 100 MM), 6 STRIPS/ENVELOPE: Brand: 3M™ STERI-STRIP™

## (undated) DEVICE — TUBING SUCTION 5MM X 12 FT

## (undated) DEVICE — THREADED CLEAR CANNULA WITH OBTURATOR 8.5MM X 75MM

## (undated) DEVICE — SHOULDER SUSPENSION KIT 6 PER BOX

## (undated) DEVICE — SUT MONOCRYL 4-0 PS-2 27 IN Y426H

## (undated) DEVICE — TUBING ARTHROSCOPY REDUCE PUMP

## (undated) DEVICE — 3M™ STERI-STRIP™ BLEND TONE SKIN CLOSURES, B1557, TAN, 1/2 IN X 4 IN (12MM X 100MM), 6 STRIPS/ENVELOPE: Brand: 3M™ STERI-STRIP™

## (undated) DEVICE — GLOVE INDICATOR PI UNDERGLOVE SZ 7.5 BLUE

## (undated) DEVICE — THREADED CLEAR CANNULA WITH OBTURATOR 7MM X 75MM

## (undated) DEVICE — 3M™ IOBAN™ 2 ANTIMICROBIAL INCISE DRAPE 6650EZ: Brand: IOBAN™ 2

## (undated) DEVICE — PACK PBDS SHOULDER ARTHROSCOPY RF

## (undated) DEVICE — TUBING ARTHROSCOPY REDUCE PATIENT